# Patient Record
Sex: MALE | Race: WHITE | NOT HISPANIC OR LATINO | Employment: FULL TIME | URBAN - METROPOLITAN AREA
[De-identification: names, ages, dates, MRNs, and addresses within clinical notes are randomized per-mention and may not be internally consistent; named-entity substitution may affect disease eponyms.]

---

## 2023-06-26 ENCOUNTER — OFFICE VISIT (OUTPATIENT)
Dept: ORTHOPEDICS | Facility: CLINIC | Age: 43
End: 2023-06-26
Payer: COMMERCIAL

## 2023-06-26 ENCOUNTER — HOSPITAL ENCOUNTER (OUTPATIENT)
Dept: RADIOLOGY | Facility: HOSPITAL | Age: 43
Discharge: HOME | End: 2023-06-26
Attending: PHYSICIAN ASSISTANT
Payer: COMMERCIAL

## 2023-06-26 DIAGNOSIS — S46.912A ELBOW STRAIN, LEFT, INITIAL ENCOUNTER: Primary | ICD-10-CM

## 2023-06-26 DIAGNOSIS — M25.522 LEFT ELBOW PAIN: Primary | ICD-10-CM

## 2023-06-26 DIAGNOSIS — M25.522 LEFT ELBOW PAIN: ICD-10-CM

## 2023-06-26 PROCEDURE — 99999 PR PBB SHADOW E&M-NEW PATIENT-LVL II: ICD-10-PCS | Mod: PBBFAC,,, | Performed by: PHYSICIAN ASSISTANT

## 2023-06-26 PROCEDURE — 73080 X-RAY EXAM OF ELBOW: CPT | Mod: 26,LT,, | Performed by: RADIOLOGY

## 2023-06-26 PROCEDURE — 73080 XR ELBOW COMPLETE 3 VIEW LEFT: ICD-10-PCS | Mod: 26,LT,, | Performed by: RADIOLOGY

## 2023-06-26 PROCEDURE — 99203 PR OFFICE/OUTPT VISIT, NEW, LEVL III, 30-44 MIN: ICD-10-PCS | Mod: S$GLB,,, | Performed by: PHYSICIAN ASSISTANT

## 2023-06-26 PROCEDURE — 1160F PR REVIEW ALL MEDS BY PRESCRIBER/CLIN PHARMACIST DOCUMENTED: ICD-10-PCS | Mod: CPTII,S$GLB,, | Performed by: PHYSICIAN ASSISTANT

## 2023-06-26 PROCEDURE — 99203 OFFICE O/P NEW LOW 30 MIN: CPT | Mod: S$GLB,,, | Performed by: PHYSICIAN ASSISTANT

## 2023-06-26 PROCEDURE — 73080 X-RAY EXAM OF ELBOW: CPT | Mod: TC,LT

## 2023-06-26 PROCEDURE — 1159F MED LIST DOCD IN RCRD: CPT | Mod: CPTII,S$GLB,, | Performed by: PHYSICIAN ASSISTANT

## 2023-06-26 PROCEDURE — 1159F PR MEDICATION LIST DOCUMENTED IN MEDICAL RECORD: ICD-10-PCS | Mod: CPTII,S$GLB,, | Performed by: PHYSICIAN ASSISTANT

## 2023-06-26 PROCEDURE — 1160F RVW MEDS BY RX/DR IN RCRD: CPT | Mod: CPTII,S$GLB,, | Performed by: PHYSICIAN ASSISTANT

## 2023-06-26 PROCEDURE — 99999 PR PBB SHADOW E&M-NEW PATIENT-LVL II: CPT | Mod: PBBFAC,,, | Performed by: PHYSICIAN ASSISTANT

## 2023-06-26 NOTE — PROGRESS NOTES
Chief Complaint & History of Present Illness:  Mitesh Aquino is a 43 y.o. year old male presenting with left upper arm pain since 5/19/23.   He was in his attic and fell through sheetrock and when he grabbed onto the ceiling he felt a pull through his biceps. Most of the pain now seems to around the elbow.  He does have weakness with lifting.  He has noticed a deformity in his biceps.  There is not a history of previous injury or surgery to the elbow.      Review of patient's allergies indicates:   Allergen Reactions    Poison ivy extract          No current outpatient medications on file.     No current facility-administered medications for this visit.       No past medical history on file.    No past surgical history on file.    Vital Signs (Most Recent)  There were no vitals filed for this visit.        Review of Systems:  ROS:  Constitutional: no fever or chills  Eyes: no visual changes  ENT: no nasal congestion or sore throat  Respiratory: no cough or shortness of breath  Cardiovascular: no chest pain or palpitations  Musculoskeletal: no arthralgias or myalgias    OBJECTIVE:     PHYSICAL EXAM:   General Appearance: Well nourished, well developed, in no acute distress.  CV: 2+ UE/LE distal pulses bilaterally.  Resp:  Respirations equal and unlabored.  Neurological: Mood & affect are normal.  GI: Abdomen soft and non-tender.  left  Elbow:   Skin intact  No ecchymosis or swelling  Jaun deformity  Positive hook test  4/5 biceps  ltsi C5-T1  + epl, io, fds, fdp   2+ RP     RADIOGRAPHS:  X-rays of the left elbow, personally reviewed by me, demonstrate well maintained joint space.  No fracture or dislocation.     ASSESSMENT/PLAN:     43 year old male with left arm injury    - I suspect distal biceps tendon rupture  - MRI left elbow to evaluate and then follow up with sports medicine  - Will call to discuss results      7/6/23 Addendum:  Note to clarify injury- patient was spraying for termites in the attic at work  when the injury occurred

## 2023-06-27 ENCOUNTER — TELEPHONE (OUTPATIENT)
Dept: ORTHOPEDICS | Facility: CLINIC | Age: 43
End: 2023-06-27
Payer: COMMERCIAL

## 2023-06-27 NOTE — TELEPHONE ENCOUNTER
Lvm with name and call back number to discuss Mri being pushed back due to approval with insurance

## 2023-07-06 ENCOUNTER — TELEPHONE (OUTPATIENT)
Dept: URGENT CARE | Facility: CLINIC | Age: 43
End: 2023-07-06
Payer: COMMERCIAL

## 2023-07-06 NOTE — TELEPHONE ENCOUNTER
"Spoke with Pt.  States he has a work comp claim.  Provided the adjusters information as Valeria Hathaway, [phone].  I've left a VM with "provider services" at that number.  "

## 2023-07-19 ENCOUNTER — HOSPITAL ENCOUNTER (OUTPATIENT)
Dept: RADIOLOGY | Facility: OTHER | Age: 43
Discharge: HOME | End: 2023-07-19
Attending: PHYSICIAN ASSISTANT
Payer: COMMERCIAL

## 2023-07-19 DIAGNOSIS — S46.912A ELBOW STRAIN, LEFT, INITIAL ENCOUNTER: ICD-10-CM

## 2023-07-19 PROCEDURE — 73221 MRI ELBOW WITHOUT CONTRAST LEFT: ICD-10-PCS | Mod: 26,LT,, | Performed by: RADIOLOGY

## 2023-07-19 PROCEDURE — 73221 MRI JOINT UPR EXTREM W/O DYE: CPT | Mod: TC,LT

## 2023-07-19 PROCEDURE — 73221 MRI JOINT UPR EXTREM W/O DYE: CPT | Mod: 26,LT,, | Performed by: RADIOLOGY

## 2023-07-20 ENCOUNTER — TELEPHONE (OUTPATIENT)
Dept: SPORTS MEDICINE | Facility: CLINIC | Age: 43
End: 2023-07-20
Payer: COMMERCIAL

## 2023-07-20 DIAGNOSIS — S46.212A RUPTURE OF LEFT DISTAL BICEPS TENDON, INITIAL ENCOUNTER: Primary | ICD-10-CM

## 2023-07-20 NOTE — TELEPHONE ENCOUNTER
Left message for patient letting him know that he will not be able to see Dr Mccall today due to not having W/C approval yet. Explained to patient that the appointment will be canceled until approval is obtained.

## 2023-07-24 ENCOUNTER — TELEPHONE (OUTPATIENT)
Dept: SPORTS MEDICINE | Facility: CLINIC | Age: 43
End: 2023-07-24
Payer: COMMERCIAL

## 2023-07-24 NOTE — TELEPHONE ENCOUNTER
Spoke to patient and let him know as soon as we have W/C approval we will call to schedule appointment with Dr Mccall.

## 2023-07-24 NOTE — TELEPHONE ENCOUNTER
----- Message from Fadumo Lerma sent at 7/24/2023  9:45 AM CDT -----  Regarding: pt advice  Contact: [phone]  Pt requesting confirmation of authorization from  in order to be seen. Pt would also like to schedule an appt. Previous appt was cancelled due to no authorization. Pls call to discuss.

## 2023-07-26 ENCOUNTER — TELEPHONE (OUTPATIENT)
Dept: SPORTS MEDICINE | Facility: CLINIC | Age: 43
End: 2023-07-26
Payer: COMMERCIAL

## 2023-07-26 NOTE — TELEPHONE ENCOUNTER
----- Message from Jenelle Pugh sent at 7/26/2023  8:20 AM CDT -----  Regarding: pt advice  Contact: [phone]  Mitesh Aquino calling regarding Patient Advice (message) for #an update regarding worker's comp auth. He would like to schedule an appt asap. Please call

## 2023-07-26 NOTE — PROGRESS NOTES
"CC: LEFT elbow  pain     43 y.o. Right hand dominant. Male presents as a new patient to me. This is a worker's compensation case. He  is the owner of [employer]. Reports on May 19, 2023 he fell through the attic ceiling at work and tried to grab onto the ceiling on the way down. He reports feeling a pop at the time.  He experienced subsequent swelling and bruising about the distal arm and elbow. Pain localized at the insertion and distal muscle belly of bicep with reverse Jaun deformity. Worse with elbow and forearm lifting, pushing and pulling movements - specifically FA supination movement. Better with rest. Denies neck pain or radicular symptoms. Treatment thus far has included activity modifications, rest, and oral medication. Here today to discuss diagnosis and treatment options.     Negative for tobacco.   Negative for diabetes.     Pain Score: 0-No pain    PAST MEDICAL HISTORY:   History reviewed. No pertinent past medical history.    PAST SURGICAL HISTORY:  History reviewed. No pertinent surgical history.    FAMILY HISTORY:  History reviewed. No pertinent family history.    MEDICATIONS:  No current outpatient medications on file.    ALLERGIES:  Review of patient's allergies indicates:   Allergen Reactions    Poison ivy extract      REVIEW OF SYSTEMS:  Constitution: Negative. Negative for chills, fever and night sweats.    Hematologic/Lymphatic: Negative for bleeding problem. Does not bruise/bleed easily.   Skin: Negative for dry skin, itching and rash.   Musculoskeletal: Negative for falls. Positive for left elbow pain and muscle weakness.     All other review of symptoms were reviewed and found to be noncontributory.     PHYSICAL EXAMINATION:  Vitals:  BP (!) 148/93   Pulse 62   Ht 5' 9" (1.753 m)   Wt 93 kg (205 lb 0.4 oz)   BMI 30.28 kg/m²    General: Well-developed well-nourished 43 y.o. malein no acute distress   Cardiovascular: Regular rhythm by palpation of distal " pulse, normal color and temperature, no concerning varicosities on symptomatic side   Lungs: No labored breathing or wheezing appreciated   Neuro: Alert and oriented ×3   Psychiatric: well oriented to person, place and time, demonstrates normal mood and affect   Skin: No rashes, lesions or ulcers, normal temperature, turgor, and texture on uninvolved extremity    Ortho/SPM Exam  Examination of the left elbow demonstrates full elbow flexion and extension.  Lacks 5-10 degrees of terminal forearm supination.  Full forearm pronation.  Significant weakness and some pain on resisted forearm supination.  4- out of 5.  4/5 resisted elbow flexion with some reported weakness.  Positive hook test for distal biceps rupture.  Reverse Jaun deformity.  Stable to varus and valgus stress.  Intact distal triceps.  Palpable distal biceps tendon torn and retracted proximal to the volar antecubital crease.      IMAGING:  Xrays including AP, Lateral and Radial Capitallar views of the left elbow are ordered / images reviewed by me:   No fracture or acute change appreciated. There is a small bony spur seen arising from the dorsal aspect of the olecranon process.    MRI of Left elbow 7/19/23:  Findings consistent with chronic tear distal biceps tendon given nonvisualization at the level the radial tuberosity and stump of retracted biceps approximately 8 cm proximal (level of the distal humerus).    ASSESSMENT:      ICD-10-CM ICD-9-CM   1. 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  The patient is now 10 weeks out from his date of injury and I have some concerns regarding whether this will be primarily repairable.  Discussed both operative and nonoperative treatment options and the typical functional limitations associated with nonoperative treatment in this situation.  The patient wishes to proceed with operative repair/reconstruction  given his desire to return to prior activities as much as possible.  We have discussed the chronicity of this injury and how this will affect our surgical approach.  Plan for likely reconstruction but will assess tendon quality and repair ability intraoperatively.  Plan for allograft tissue reconstruction and we discussed specifically the planned S shaped skin incision which will certainly affect his tattoos.  He verbalized understanding.  Outlined risks of surgery include but are not limited to continued or recurrent pain, weakness, heterotopic ossification, neurovascular injury, scarring, cosmetic disruption of his existing tattoos, infection among others.  I expect him to do well.  He does wish to proceed.    Plan is left distal biceps allograft reconstruction    Informed Consent:    The details of the surgical procedure were explained, including the location of probable incisions and a description of possible hardware and/or grafts to be used. Alternatives to both operative and non-operative options with associated risks and benefits were discussed. The patient understands the likely convalescence after surgery and, in particular, the expected postop rehab and recovery course. The outlined risks and potential complications of the proposed procedure include but are not limited to: infection, poor wound healing, scarring, deformity, stiffness, swelling, continued or recurrent pain, instability, hardware or prosthetic failure if implanted, symptomatic hardware requiring removal, dislocation, weakness, neurovascular injury, numbness, chronic regional pain disorder, tissue nonhealing/irreparability/retear, subsequent contralateral limb injury or pathology, chondral injury, arthritis, fracture, blood clot formation, inability to return to previous level of activity, anesthetic or regional block complication up to death, need for additional procedure as indicated intraoperatively, and potential need for further  surgery.    The patient was also informed and understands that the risks of surgery are greater for patients with a current condition or history of heart disease, obesity, clotting disorders, recurrent infections, steroid use, current or past smoking, and factors such as sedentary lifestyle and noncompliance with medications, therapy or follow-up. The degree of the increased risk is hard to estimate with any degree of precision. If applicable, smoking cessation was discussed.     All questions were answered. The patient has verbalized understanding of these issues and wishes to proceed with the surgery as discussed.    Procedures

## 2023-07-27 ENCOUNTER — TELEPHONE (OUTPATIENT)
Dept: SPORTS MEDICINE | Facility: CLINIC | Age: 43
End: 2023-07-27
Payer: COMMERCIAL

## 2023-07-27 ENCOUNTER — OFFICE VISIT (OUTPATIENT)
Dept: SPORTS MEDICINE | Facility: CLINIC | Age: 43
End: 2023-07-27
Payer: COMMERCIAL

## 2023-07-27 ENCOUNTER — PATIENT MESSAGE (OUTPATIENT)
Dept: SPORTS MEDICINE | Facility: CLINIC | Age: 43
End: 2023-07-27

## 2023-07-27 VITALS
BODY MASS INDEX: 30.36 KG/M2 | WEIGHT: 205 LBS | HEIGHT: 69 IN | HEART RATE: 62 BPM | DIASTOLIC BLOOD PRESSURE: 93 MMHG | SYSTOLIC BLOOD PRESSURE: 148 MMHG

## 2023-07-27 DIAGNOSIS — S46.212A RUPTURE OF LEFT DISTAL BICEPS TENDON, INITIAL ENCOUNTER: ICD-10-CM

## 2023-07-27 PROCEDURE — 3080F DIAST BP >= 90 MM HG: CPT | Mod: CPTII,S$GLB,, | Performed by: ORTHOPAEDIC SURGERY

## 2023-07-27 PROCEDURE — 3008F PR BODY MASS INDEX (BMI) DOCUMENTED: ICD-10-PCS | Mod: CPTII,S$GLB,, | Performed by: ORTHOPAEDIC SURGERY

## 2023-07-27 PROCEDURE — 1159F MED LIST DOCD IN RCRD: CPT | Mod: CPTII,S$GLB,, | Performed by: ORTHOPAEDIC SURGERY

## 2023-07-27 PROCEDURE — 3080F PR MOST RECENT DIASTOLIC BLOOD PRESSURE >= 90 MM HG: ICD-10-PCS | Mod: CPTII,S$GLB,, | Performed by: ORTHOPAEDIC SURGERY

## 2023-07-27 PROCEDURE — 3008F BODY MASS INDEX DOCD: CPT | Mod: CPTII,S$GLB,, | Performed by: ORTHOPAEDIC SURGERY

## 2023-07-27 PROCEDURE — 3077F SYST BP >= 140 MM HG: CPT | Mod: CPTII,S$GLB,, | Performed by: ORTHOPAEDIC SURGERY

## 2023-07-27 PROCEDURE — 1159F PR MEDICATION LIST DOCUMENTED IN MEDICAL RECORD: ICD-10-PCS | Mod: CPTII,S$GLB,, | Performed by: ORTHOPAEDIC SURGERY

## 2023-07-27 PROCEDURE — 99204 OFFICE O/P NEW MOD 45 MIN: CPT | Mod: S$GLB,,, | Performed by: ORTHOPAEDIC SURGERY

## 2023-07-27 PROCEDURE — 3077F PR MOST RECENT SYSTOLIC BLOOD PRESSURE >= 140 MM HG: ICD-10-PCS | Mod: CPTII,S$GLB,, | Performed by: ORTHOPAEDIC SURGERY

## 2023-07-27 PROCEDURE — 99999 PR PBB SHADOW E&M-EST. PATIENT-LVL III: ICD-10-PCS | Mod: PBBFAC,,, | Performed by: ORTHOPAEDIC SURGERY

## 2023-07-27 PROCEDURE — 99204 PR OFFICE/OUTPT VISIT, NEW, LEVL IV, 45-59 MIN: ICD-10-PCS | Mod: S$GLB,,, | Performed by: ORTHOPAEDIC SURGERY

## 2023-07-27 PROCEDURE — 99999 PR PBB SHADOW E&M-EST. PATIENT-LVL III: CPT | Mod: PBBFAC,,, | Performed by: ORTHOPAEDIC SURGERY

## 2023-07-27 NOTE — TELEPHONE ENCOUNTER
Dr. Mccall spoke to patient via phone about the risks associated with distal biceps reconstruction surgery.     Kaley Diamond [facility], OT  Sports Medicine Assistant - Dr. Taj Mccall   [facility] [place]

## 2023-07-28 ENCOUNTER — PATIENT MESSAGE (OUTPATIENT)
Dept: SPORTS MEDICINE | Facility: CLINIC | Age: 43
End: 2023-07-28
Payer: COMMERCIAL

## 2023-07-28 DIAGNOSIS — S46.212A RUPTURE OF LEFT DISTAL BICEPS TENDON, INITIAL ENCOUNTER: Primary | ICD-10-CM

## 2023-08-02 ENCOUNTER — PATIENT MESSAGE (OUTPATIENT)
Dept: SURGERY | Facility: HOSPITAL | Age: 43
End: 2023-08-02
Payer: COMMERCIAL

## 2023-08-03 ENCOUNTER — PATIENT MESSAGE (OUTPATIENT)
Dept: PREADMISSION TESTING | Facility: HOSPITAL | Age: 43
End: 2023-08-03
Payer: COMMERCIAL

## 2023-08-03 NOTE — ANESTHESIA PAT ROS NOTE
08/03/2023  Mitesh Aquino is a 43 y.o., male.      Pre-op Assessment    I have reviewed the Patient Summary Reports.       I have reviewed the Medications.     Review of Systems  Anesthesia Hx:  No previous Anesthesia   Neg history of prior surgery.             Social:  Non-Smoker       Hematology/Oncology:  Hematology Normal   Oncology Normal                                   EENT/Dental:  EENT/Dental Normal           Cardiovascular:  Cardiovascular Normal Exercise tolerance: good       Denies CAD.       Denies Angina.       Denies EDMONDS.                            Pulmonary:  Pulmonary Normal    Denies Asthma.   Denies Shortness of breath.                  Renal/:  Renal/ Normal  Denies Chronic Renal Disease.                Hepatic/GI:  Hepatic/GI Normal     Denies GERD. Denies Liver Disease.            Musculoskeletal:     Rupture of left distal biceps tendon,            Neurological:  Neurology Normal      Denies Headaches. Denies Seizures.                                Endocrine:  Denies Diabetes. Denies Hypothyroidism.        Obesity / BMI > 30  Dermatological:  Skin Normal    Psych:  Psychiatric Normal                   No past medical history on file.  No past surgical history on file.      Anesthesia Assessment: Preoperative EQUATION    Planned Procedure: Procedure(s) (LRB):  REPAIR, TENDON, ARTHROSCOPIC, WITH CONVERSION TO OPEN TENDON REPAIR IF INDICATED, RECONSTRUCTION (Left)  Requested Anesthesia Type:General  Surgeon: CHRISSY Mccall MD  Service: Orthopedics  Known or anticipated Date of Surgery:8/9/2023    Surgeon notes: reviewed    Electronic QUestionnaire Assessment completed via nurse interview with patient.        Triage considerations:     The patient has no apparent active cardiac condition (No unstable coronary Syndrome such as severe unstable angina or recent [<1 month] myocardial  infarction, decompensated CHF, severe valvular   disease or significant arrhythmia)    Previous anesthesia records:None    Last PCP note:  No primary care visits noted upon chart review.    Other important co-morbidities: Obesity                Instructions given. (See in Nurse's note)      Ht: 5'9  Wt; 205 lb  BMI: 30.28

## 2023-08-04 ENCOUNTER — PATIENT MESSAGE (OUTPATIENT)
Dept: SURGERY | Facility: HOSPITAL | Age: 43
End: 2023-08-04
Payer: COMMERCIAL

## 2023-08-07 ENCOUNTER — OFFICE VISIT (OUTPATIENT)
Dept: SPORTS MEDICINE | Facility: CLINIC | Age: 43
End: 2023-08-07
Payer: COMMERCIAL

## 2023-08-07 VITALS
WEIGHT: 204.13 LBS | BODY MASS INDEX: 30.15 KG/M2 | DIASTOLIC BLOOD PRESSURE: 90 MMHG | SYSTOLIC BLOOD PRESSURE: 158 MMHG | HEART RATE: 62 BPM

## 2023-08-07 DIAGNOSIS — S46.212A RUPTURE OF LEFT DISTAL BICEPS TENDON, INITIAL ENCOUNTER: Primary | ICD-10-CM

## 2023-08-07 PROCEDURE — 99499 UNLISTED E&M SERVICE: CPT | Mod: S$GLB,,, | Performed by: PHYSICIAN ASSISTANT

## 2023-08-07 PROCEDURE — 99999 PR PBB SHADOW E&M-EST. PATIENT-LVL III: ICD-10-PCS | Mod: PBBFAC,,, | Performed by: PHYSICIAN ASSISTANT

## 2023-08-07 PROCEDURE — 99999 PR PBB SHADOW E&M-EST. PATIENT-LVL III: CPT | Mod: PBBFAC,,, | Performed by: PHYSICIAN ASSISTANT

## 2023-08-07 PROCEDURE — 99499 NO LOS: ICD-10-PCS | Mod: S$GLB,,, | Performed by: PHYSICIAN ASSISTANT

## 2023-08-07 RX ORDER — ONDANSETRON 4 MG/1
4 TABLET, ORALLY DISINTEGRATING ORAL EVERY 8 HOURS PRN
Qty: 30 TABLET | Refills: 0 | Status: SHIPPED | OUTPATIENT
Start: 2023-08-07

## 2023-08-07 RX ORDER — ASPIRIN 81 MG/1
81 TABLET ORAL 2 TIMES DAILY
Qty: 28 TABLET | Refills: 0 | Status: SHIPPED | OUTPATIENT
Start: 2023-08-07 | End: 2023-08-23

## 2023-08-07 RX ORDER — OXYCODONE HYDROCHLORIDE 5 MG/1
5-10 TABLET ORAL
Qty: 28 TABLET | Refills: 0 | Status: SHIPPED | OUTPATIENT
Start: 2023-08-07

## 2023-08-07 RX ORDER — INDOMETHACIN 75 MG/1
75 CAPSULE, EXTENDED RELEASE ORAL
Qty: 21 CAPSULE | Refills: 0 | Status: SHIPPED | OUTPATIENT
Start: 2023-08-07

## 2023-08-07 NOTE — H&P
Mitesh Aquino  is here for a completion of his perioperative paperwork. he  Is scheduled to undergo left distal biceps allograft reconstruction on 8/9/2023.  He is a healthy individual and does not need clearance for this procedure.     This is a workers comp case.    Risks, indications and benefits of the surgical procedure were discussed with the patient. All questions with regard to surgery, rehab, expected return to functional activities, activities of daily living and recreational endeavors were answered to his satisfaction.    Discussed COVID-19 with the patient, they are aware of our current policies and procedures, were given the option of delaying surgery, and they elect to proceed.    Patient was informed and understands the risks of surgery are greater for patients with a current condition or hx of heart disease, obesity, clotting disorders, recurrent infections, steroid use, current or past smoking, and factors such as sedentary lifestyle and noncompliance with medications, therapy or f/u. The degree of the increased risk is hard to estimate w/ any degree of precision.    Once no other questions were asked, a brief history and physical exam was then performed.    PAST MEDICAL HISTORY: History reviewed. No pertinent past medical history.  PAST SURGICAL HISTORY: History reviewed. No pertinent surgical history.  FAMILY HISTORY: History reviewed. No pertinent family history.  SOCIAL HISTORY:   Social History     Socioeconomic History    Marital status:    Tobacco Use    Smoking status: Former     Current packs/day: 0.00     Types: Cigarettes    Smokeless tobacco: Never       MEDICATIONS: No current outpatient medications on file.  ALLERGIES:   Review of patient's allergies indicates:   Allergen Reactions    Poison ivy extract        Review of Systems   Constitution: Negative. Negative for chills, fever and night sweats.   HENT: Negative for congestion and headaches.    Eyes: Negative for blurred  vision, left vision loss and right vision loss.   Cardiovascular: Negative for chest pain and syncope.   Respiratory: Negative for cough and shortness of breath.    Endocrine: Negative for polydipsia, polyphagia and polyuria.   Hematologic/Lymphatic: Negative for bleeding problem. Does not bruise/bleed easily.   Skin: Negative for dry skin, itching and rash.   Musculoskeletal: Negative for falls and muscle weakness.   Gastrointestinal: Negative for abdominal pain and bowel incontinence.   Genitourinary: Negative for bladder incontinence and nocturia.   Neurological: Negative for disturbances in coordination, loss of balance and seizures.   Psychiatric/Behavioral: Negative for depression. The patient does not have insomnia.    Allergic/Immunologic: Negative for hives and persistent infections.     PHYSICAL EXAM:  GEN: A&Ox3, WD WN NAD  HEENT: WNL  CHEST: CTAB, no W/R/R  HEART: RRR, no M/R/G   ABD: Soft, NT ND, BS x4 QUADS  MS: Refer to previous note for detailed MS exam  NEURO: CN II-XII intact       The surgical consent was then reviewed with the patient, who agreed with all the contents of the consent form and it was signed.     PHYSICAL THERAPY:  He was also instructed regarding physical therapy and will begin on POD#1-3. He is doing physical therapy at [facility].    POST OP CARE: Instructions were reviewed including care of the wound and dressing after surgery and when he can shower.     PAIN MANAGEMENT: Mitesh Aquino was instructed regarding the ice packs that will be in place after surgery and his postoperative pain medications.     MEDICATION:  Roxicodone 5 mg 1-2 q 4 hours PRN for pain  Zofran 4 mg q 8 hours PRN for nausea and vomiting.  Aspirin 81mg BID x 2 weeks for DVT prophylaxis starting on the evening after surgery.  Indocin 75mg daily x 21 days post op for HO prophylaxis    Post op meds to be delivered bedside prior to discharge. Deliver to family if patient is in surgery at  5pm.     Patient was instructed to purchase and take Colace to counter possible GI side effects of taking opiates.     DVT prophylaxis was discussed with the patient today including risk factors for developing DVTs and history of DVTs. The patient was asked if any specific recommendations were given from the doctor/s that did pre-operative surgical clearance.      If the patient was previously taking 81mg baby aspirin, they were told to not take additional baby aspirin, using the above stated aspirin and to restart the 81mg aspirin daily after completion of the aspirin dose.      Patient was also told to buy over the counter Prilosec medication and take it once daily for GI protection as long as they are taking NSAIDs or Aspirin.     The patient was told that narcotic pain medications may make them drowsy and instructions were given to not sign legal documents, drive or operate heavy machinery, cars, or equipment while under the influence of narcotic medications.     As there were no other questions to be asked, he was given my business card along with Dr. Mccall's business card if he has any questions or concerns prior to surgery or in the postop period.

## 2023-08-08 ENCOUNTER — ANESTHESIA EVENT (OUTPATIENT)
Dept: SURGERY | Facility: HOSPITAL | Age: 43
End: 2023-08-08
Payer: COMMERCIAL

## 2023-08-08 NOTE — ANESTHESIA PREPROCEDURE EVALUATION
"[facility]  Anesthesia Pre-Operative Evaluation         Patient Name: Mitesh Aquino  YOB: 1980  MRN: [id number]    SUBJECTIVE:     Pre-operative evaluation for Procedure(s) (LRB):  REPAIR, TENDON, ARTHROSCOPIC, WITH CONVERSION TO OPEN TENDON REPAIR IF INDICATED, RECONSTRUCTION (Left)     08/08/2023    Mitesh Aquino is a 43 y.o. male w/ no significant PMHx. Recently had a traumatic incident at work where he fell through the ceiling resulting in a ruptured distal biceps tendon L.     Patient now presents for the above procedure(s) with Dr. Mccall.    Prev airway: None documented.      There is no problem list on file for this patient.      Review of patient's allergies indicates:   Allergen Reactions    Poison ivy extract        Current Inpatient Medications:      No current facility-administered medications on file prior to encounter.     No current outpatient medications on file prior to encounter.       No past surgical history on file.    Social History     Socioeconomic History    Marital status:    Tobacco Use    Smoking status: Former     Current packs/day: 0.00     Types: Cigarettes    Smokeless tobacco: Never       OBJECTIVE:     Vital Signs Range (Last 24H):         Significant Labs:  No results found for: "WBC", "HGB", "HCT", "PLT", "CHOL", "TRIG", "HDL", "LDLDIRECT", "ALT", "AST", "NA", "K", "CL", "CREATININE", "BUN", "CO2", "TSH", "PSA", "INR", "GLUF", "HGBA1C", "MICROALBUR"    Diagnostic Studies: No relevant studies.    EKG:   No results found for this or any previous visit.    2D ECHO:  TTE:  No results found for this or any previous visit.    DICK:  No results found for this or any previous visit.    ASSESSMENT/PLAN:         Pre-op Assessment    I have reviewed the Patient Summary Reports.     I have reviewed the Nursing Notes. I have reviewed the NPO Status.   I have reviewed the Medications.     Review of Systems  Anesthesia Hx:  No problems with previous " Anesthesia  Neg history of prior surgery. Denies Family Hx of Anesthesia complications.   Denies Personal Hx of Anesthesia complications.   Social:  Former Smoker    Hematology/Oncology:  Hematology Normal   Oncology Normal     EENT/Dental:EENT/Dental Normal   Cardiovascular:  Cardiovascular Normal     Pulmonary:  Pulmonary Normal    Renal/:  Renal/ Normal     Hepatic/GI:  Hepatic/GI Normal    Musculoskeletal:  Musculoskeletal Normal    Neurological:  Neurology Normal    Endocrine:  Endocrine Normal    Dermatological:  Skin Normal    Psych:  Psychiatric Normal           Physical Exam  General: Well nourished, Cooperative, Alert and Oriented    Airway:  Mallampati: II   Mouth Opening: Normal  TM Distance: Normal  Tongue: Normal  Neck ROM: Normal ROM    Dental:  Intact        Anesthesia Plan  Type of Anesthesia, risks & benefits discussed:    Anesthesia Type: Gen ETT, Regional  Intra-op Monitoring Plan: Standard ASA Monitors  Post Op Pain Control Plan: multimodal analgesia, IV/PO Opioids PRN and peripheral nerve block  Induction:  IV  Airway Plan: Direct, Post-Induction  Informed Consent: Informed consent signed with the Patient and all parties understand the risks and agree with anesthesia plan.  All questions answered.   ASA Score: 2  Day of Surgery Review of History & Physical: H&P Update referred to the surgeon/provider.    Ready For Surgery From Anesthesia Perspective.     .

## 2023-08-09 ENCOUNTER — HOSPITAL ENCOUNTER (OUTPATIENT)
Facility: HOSPITAL | Age: 43
Discharge: HOME | End: 2023-08-09
Attending: ORTHOPAEDIC SURGERY | Admitting: ORTHOPAEDIC SURGERY
Payer: COMMERCIAL

## 2023-08-09 ENCOUNTER — ANESTHESIA (OUTPATIENT)
Dept: SURGERY | Facility: HOSPITAL | Age: 43
End: 2023-08-09
Payer: COMMERCIAL

## 2023-08-09 VITALS
SYSTOLIC BLOOD PRESSURE: 136 MMHG | DIASTOLIC BLOOD PRESSURE: 87 MMHG | BODY MASS INDEX: 29.62 KG/M2 | OXYGEN SATURATION: 95 % | RESPIRATION RATE: 18 BRPM | HEIGHT: 69 IN | TEMPERATURE: 98 F | HEART RATE: 77 BPM | WEIGHT: 200 LBS

## 2023-08-09 DIAGNOSIS — S46.212A RUPTURE OF LEFT DISTAL BICEPS TENDON, INITIAL ENCOUNTER: ICD-10-CM

## 2023-08-09 PROCEDURE — 63600175 PHARM REV CODE 636 W HCPCS: Mod: JZ | Performed by: ORTHOPAEDIC SURGERY

## 2023-08-09 PROCEDURE — 63600175 PHARM REV CODE 636 W HCPCS: Performed by: PHYSICIAN ASSISTANT

## 2023-08-09 PROCEDURE — 99900035 HC TECH TIME PER 15 MIN (STAT)

## 2023-08-09 PROCEDURE — D9220A PRA ANESTHESIA: Mod: ANES,,, | Performed by: STUDENT IN AN ORGANIZED HEALTH CARE EDUCATION/TRAINING PROGRAM

## 2023-08-09 PROCEDURE — 36000708 HC OR TIME LEV III 1ST 15 MIN: Performed by: ORTHOPAEDIC SURGERY

## 2023-08-09 PROCEDURE — 25000003 PHARM REV CODE 250: Performed by: NURSE ANESTHETIST, CERTIFIED REGISTERED

## 2023-08-09 PROCEDURE — 71000033 HC RECOVERY, INTIAL HOUR: Performed by: ORTHOPAEDIC SURGERY

## 2023-08-09 PROCEDURE — 25000003 PHARM REV CODE 250: Performed by: PHYSICIAN ASSISTANT

## 2023-08-09 PROCEDURE — 63600175 PHARM REV CODE 636 W HCPCS: Performed by: NURSE ANESTHETIST, CERTIFIED REGISTERED

## 2023-08-09 PROCEDURE — 71000015 HC POSTOP RECOV 1ST HR: Performed by: ORTHOPAEDIC SURGERY

## 2023-08-09 PROCEDURE — 27201423 OPTIME MED/SURG SUP & DEVICES STERILE SUPPLY: Performed by: ORTHOPAEDIC SURGERY

## 2023-08-09 PROCEDURE — 25000003 PHARM REV CODE 250: Performed by: STUDENT IN AN ORGANIZED HEALTH CARE EDUCATION/TRAINING PROGRAM

## 2023-08-09 PROCEDURE — 24342 PR REINSERT BI/TRICEPS TENDON,DISTAL: ICD-10-PCS | Mod: 22,LT,, | Performed by: ORTHOPAEDIC SURGERY

## 2023-08-09 PROCEDURE — C1713 ANCHOR/SCREW BN/BN,TIS/BN: HCPCS | Performed by: ORTHOPAEDIC SURGERY

## 2023-08-09 PROCEDURE — 24342 REPAIR OF RUPTURED TENDON: CPT | Mod: 22,LT,, | Performed by: ORTHOPAEDIC SURGERY

## 2023-08-09 PROCEDURE — 94761 N-INVAS EAR/PLS OXIMETRY MLT: CPT

## 2023-08-09 PROCEDURE — 37000008 HC ANESTHESIA 1ST 15 MINUTES: Performed by: ORTHOPAEDIC SURGERY

## 2023-08-09 PROCEDURE — 37000009 HC ANESTHESIA EA ADD 15 MINS: Performed by: ORTHOPAEDIC SURGERY

## 2023-08-09 PROCEDURE — D9220A PRA ANESTHESIA: ICD-10-PCS | Mod: ANES,,, | Performed by: STUDENT IN AN ORGANIZED HEALTH CARE EDUCATION/TRAINING PROGRAM

## 2023-08-09 PROCEDURE — D9220A PRA ANESTHESIA: Mod: CRNA,,, | Performed by: NURSE ANESTHETIST, CERTIFIED REGISTERED

## 2023-08-09 PROCEDURE — D9220A PRA ANESTHESIA: ICD-10-PCS | Mod: CRNA,,, | Performed by: NURSE ANESTHETIST, CERTIFIED REGISTERED

## 2023-08-09 PROCEDURE — C1762 CONN TISS, HUMAN(INC FASCIA): HCPCS | Performed by: ORTHOPAEDIC SURGERY

## 2023-08-09 PROCEDURE — 36000709 HC OR TIME LEV III EA ADD 15 MIN: Performed by: ORTHOPAEDIC SURGERY

## 2023-08-09 DEVICE — SCREW PEEK TENODESIS 7 X10MM: Type: IMPLANTABLE DEVICE | Site: ELBOW | Status: FUNCTIONAL

## 2023-08-09 DEVICE — SYS IMPL DEL DISTAL BICEPS BC: Type: IMPLANTABLE DEVICE | Site: ELBOW | Status: FUNCTIONAL

## 2023-08-09 DEVICE — TENDON ACHILLES W/CALC 19.5CM: Type: IMPLANTABLE DEVICE | Site: ELBOW | Status: FUNCTIONAL

## 2023-08-09 RX ORDER — CELECOXIB 200 MG/1
400 CAPSULE ORAL
Status: COMPLETED | OUTPATIENT
Start: 2023-08-09 | End: 2023-08-09

## 2023-08-09 RX ORDER — SODIUM CHLORIDE 9 MG/ML
INJECTION, SOLUTION INTRAVENOUS CONTINUOUS
Status: DISCONTINUED | OUTPATIENT
Start: 2023-08-09 | End: 2023-08-09 | Stop reason: HOSPADM

## 2023-08-09 RX ORDER — ROCURONIUM BROMIDE 10 MG/ML
INJECTION, SOLUTION INTRAVENOUS
Status: DISCONTINUED | OUTPATIENT
Start: 2023-08-09 | End: 2023-08-09

## 2023-08-09 RX ORDER — FAMOTIDINE 10 MG/ML
INJECTION INTRAVENOUS
Status: DISCONTINUED | OUTPATIENT
Start: 2023-08-09 | End: 2023-08-09

## 2023-08-09 RX ORDER — CARBOXYMETHYLCELLULOSE SODIUM 10 MG/ML
GEL OPHTHALMIC
Status: DISCONTINUED | OUTPATIENT
Start: 2023-08-09 | End: 2023-08-09

## 2023-08-09 RX ORDER — DEXMEDETOMIDINE HYDROCHLORIDE 100 UG/ML
INJECTION, SOLUTION INTRAVENOUS
Status: DISCONTINUED | OUTPATIENT
Start: 2023-08-09 | End: 2023-08-09

## 2023-08-09 RX ORDER — BUPIVACAINE HYDROCHLORIDE 5 MG/ML
INJECTION, SOLUTION EPIDURAL; INTRACAUDAL
Status: DISCONTINUED | OUTPATIENT
Start: 2023-08-09 | End: 2023-08-09 | Stop reason: HOSPADM

## 2023-08-09 RX ORDER — FENTANYL CITRATE 50 UG/ML
INJECTION, SOLUTION INTRAMUSCULAR; INTRAVENOUS
Status: DISCONTINUED | OUTPATIENT
Start: 2023-08-09 | End: 2023-08-09

## 2023-08-09 RX ORDER — HALOPERIDOL 5 MG/ML
0.5 INJECTION INTRAMUSCULAR EVERY 10 MIN PRN
Status: ACTIVE | OUTPATIENT
Start: 2023-08-09

## 2023-08-09 RX ORDER — CEFAZOLIN SODIUM 2 G/50ML
2 SOLUTION INTRAVENOUS
Status: CANCELLED | OUTPATIENT
Start: 2023-08-09

## 2023-08-09 RX ORDER — ACETAMINOPHEN 500 MG
1000 TABLET ORAL
Status: COMPLETED | OUTPATIENT
Start: 2023-08-09 | End: 2023-08-09

## 2023-08-09 RX ORDER — LIDOCAINE HYDROCHLORIDE 20 MG/ML
INJECTION INTRAVENOUS
Status: DISCONTINUED | OUTPATIENT
Start: 2023-08-09 | End: 2023-08-09

## 2023-08-09 RX ORDER — SODIUM CHLORIDE 0.9 % (FLUSH) 0.9 %
10 SYRINGE (ML) INJECTION
Status: ACTIVE | OUTPATIENT
Start: 2023-08-09

## 2023-08-09 RX ORDER — SODIUM CHLORIDE 9 MG/ML
INJECTION, SOLUTION INTRAVENOUS CONTINUOUS
Status: CANCELLED | OUTPATIENT
Start: 2023-08-09

## 2023-08-09 RX ORDER — METHOCARBAMOL 500 MG/1
1000 TABLET, FILM COATED ORAL ONCE
Status: COMPLETED | OUTPATIENT
Start: 2023-08-09 | End: 2023-08-09

## 2023-08-09 RX ORDER — FENTANYL CITRATE 50 UG/ML
25 INJECTION, SOLUTION INTRAMUSCULAR; INTRAVENOUS EVERY 5 MIN PRN
Status: ACTIVE | OUTPATIENT
Start: 2023-08-09

## 2023-08-09 RX ORDER — MIDAZOLAM HYDROCHLORIDE 1 MG/ML
INJECTION INTRAMUSCULAR; INTRAVENOUS
Status: DISCONTINUED | OUTPATIENT
Start: 2023-08-09 | End: 2023-08-09

## 2023-08-09 RX ORDER — FENTANYL CITRATE 50 UG/ML
25-200 INJECTION, SOLUTION INTRAMUSCULAR; INTRAVENOUS
Status: DISCONTINUED | OUTPATIENT
Start: 2023-08-09 | End: 2023-08-09 | Stop reason: HOSPADM

## 2023-08-09 RX ORDER — OXYCODONE HYDROCHLORIDE 5 MG/1
5 TABLET ORAL
Status: DISCONTINUED | OUTPATIENT
Start: 2023-08-09 | End: 2023-08-09 | Stop reason: HOSPADM

## 2023-08-09 RX ORDER — ONDANSETRON 2 MG/ML
INJECTION INTRAMUSCULAR; INTRAVENOUS
Status: DISCONTINUED | OUTPATIENT
Start: 2023-08-09 | End: 2023-08-09

## 2023-08-09 RX ORDER — MIDAZOLAM HYDROCHLORIDE 1 MG/ML
.5-4 INJECTION INTRAMUSCULAR; INTRAVENOUS
Status: DISCONTINUED | OUTPATIENT
Start: 2023-08-09 | End: 2023-08-09 | Stop reason: HOSPADM

## 2023-08-09 RX ORDER — PROPOFOL 10 MG/ML
VIAL (ML) INTRAVENOUS
Status: DISCONTINUED | OUTPATIENT
Start: 2023-08-09 | End: 2023-08-09

## 2023-08-09 RX ORDER — KETAMINE HCL IN 0.9 % NACL 50 MG/5 ML
SYRINGE (ML) INTRAVENOUS
Status: DISCONTINUED | OUTPATIENT
Start: 2023-08-09 | End: 2023-08-09

## 2023-08-09 RX ORDER — DEXAMETHASONE SODIUM PHOSPHATE 4 MG/ML
INJECTION, SOLUTION INTRA-ARTICULAR; INTRALESIONAL; INTRAMUSCULAR; INTRAVENOUS; SOFT TISSUE
Status: DISCONTINUED | OUTPATIENT
Start: 2023-08-09 | End: 2023-08-09

## 2023-08-09 RX ADMIN — CEFAZOLIN 2 G: 2 INJECTION, POWDER, FOR SOLUTION INTRAMUSCULAR; INTRAVENOUS at 01:08

## 2023-08-09 RX ADMIN — PROPOFOL 50 MG: 10 INJECTION, EMULSION INTRAVENOUS at 01:08

## 2023-08-09 RX ADMIN — MIDAZOLAM HYDROCHLORIDE 2 MG: 1 INJECTION, SOLUTION INTRAMUSCULAR; INTRAVENOUS at 01:08

## 2023-08-09 RX ADMIN — CELECOXIB 400 MG: 200 CAPSULE ORAL at 11:08

## 2023-08-09 RX ADMIN — METHOCARBAMOL 1000 MG: 500 TABLET ORAL at 05:08

## 2023-08-09 RX ADMIN — OXYCODONE HYDROCHLORIDE 5 MG: 5 TABLET ORAL at 04:08

## 2023-08-09 RX ADMIN — ONDANSETRON 4 MG: 2 INJECTION INTRAMUSCULAR; INTRAVENOUS at 03:08

## 2023-08-09 RX ADMIN — FENTANYL CITRATE 100 MCG: 50 INJECTION, SOLUTION INTRAMUSCULAR; INTRAVENOUS at 01:08

## 2023-08-09 RX ADMIN — ROCURONIUM BROMIDE 50 MG: 10 INJECTION, SOLUTION INTRAVENOUS at 01:08

## 2023-08-09 RX ADMIN — PROPOFOL 200 MG: 10 INJECTION, EMULSION INTRAVENOUS at 01:08

## 2023-08-09 RX ADMIN — DEXMEDETOMIDINE 8 MCG: 100 INJECTION, SOLUTION, CONCENTRATE INTRAVENOUS at 01:08

## 2023-08-09 RX ADMIN — Medication 25 MG: at 01:08

## 2023-08-09 RX ADMIN — CARBOXYMETHYLCELLULOSE SODIUM 4 DROP: 10 GEL OPHTHALMIC at 01:08

## 2023-08-09 RX ADMIN — DEXAMETHASONE SODIUM PHOSPHATE 8 MG: 4 INJECTION, SOLUTION INTRAMUSCULAR; INTRAVENOUS at 01:08

## 2023-08-09 RX ADMIN — SUGAMMADEX 200 MG: 100 INJECTION, SOLUTION INTRAVENOUS at 04:08

## 2023-08-09 RX ADMIN — FAMOTIDINE 20 MG: 10 INJECTION, SOLUTION INTRAVENOUS at 01:08

## 2023-08-09 RX ADMIN — LIDOCAINE HYDROCHLORIDE 100 MG: 20 INJECTION INTRAVENOUS at 01:08

## 2023-08-09 RX ADMIN — SODIUM CHLORIDE: 9 INJECTION, SOLUTION INTRAVENOUS at 11:08

## 2023-08-09 RX ADMIN — SODIUM CHLORIDE, SODIUM GLUCONATE, SODIUM ACETATE, POTASSIUM CHLORIDE, MAGNESIUM CHLORIDE, SODIUM PHOSPHATE, DIBASIC, AND POTASSIUM PHOSPHATE: .53; .5; .37; .037; .03; .012; .00082 INJECTION, SOLUTION INTRAVENOUS at 02:08

## 2023-08-09 RX ADMIN — ACETAMINOPHEN 1000 MG: 500 TABLET ORAL at 11:08

## 2023-08-09 NOTE — BRIEF OP NOTE
OUTPATIENT ORTHOPAEDIC SURGERY    Brief Operative Note       Surgery Date: 8/9/2023     Pre-op Diagnosis:  Rupture of left distal biceps tendon, initial encounter [S46.212A]    Post-op Diagnosis: Rupture of left distal biceps tendon, initial encounter [S46.212A]    Procedures: Procedure(s) (LRB):  REPAIR, TENDON, ARTHROSCOPIC, WITH CONVERSION TO OPEN TENDON REPAIR IF INDICATED, RECONSTRUCTION (Left)    Surgeon: Surgeon(s) and Role:     * CHRISSY Mccall MD - Primary    Anesthesia: General    Findings/Key Components: chronic, retracted left distal biceps rupture     Core Measure Documentation:  Were antibiotics extended? No  Was the patient administered a VTE Prophylaxis? No. Short procedure; low risk  Estimated Blood Loss: minimal           Specimens (From admission, onward)      None            Implants:   Implant Name Type Inv. Item Serial No.  Lot No. LRB No. Used Action   SYS IMPL DEL DISTAL BICEPS BC - [id number]  SYS IMPL DEL DISTAL BICEPS BC  [employer] [id number] Left 1 Implanted   SCREW PEEK TENODESIS 7 X10MM - [id number]  SCREW PEEK TENODESIS 7 X10MM  [employer] [id number] Left 1 Implanted   [id number]  TENDON ACHILLES W/CALC 19.5CM [id number] MTF  Left 1 Implanted       Complications: none           Disposition: PACU - hemodynamically stable.           Condition: Stable    Attestation:  I was present for the entire procedure.    Discharge Note      SUMMARY     Admit Date: 8/9/2023    Attending Physician: CHRISSY Mccall MD     Discharge Date: 08/09/2023    Final Diagnosis: please see operative report    Hospital Course of Care: Patient underwent elective surgery surgery and was transferred to PACU in stable condition.  In PACU, patient received appropriate post-operative care and was transferred to medical floor for neurovascular monitoring and pain control.  There patient progressed appropriately. Once met anesthesia derived discharge criteria, the patient was discharged home with plans for  physical therapy and follow-up with the operative surgeon.    Disposition: Home or Self Care    Patient Instructions:   Current Discharge Medication List        CONTINUE these medications which have NOT CHANGED    Details   aspirin (ECOTRIN) 81 MG EC tablet Take 1 tablet (81 mg total) by mouth 2 (two) times a day. for 14 days  Qty: 28 tablet, Refills: 0    Associated Diagnoses: Rupture of left distal biceps tendon, initial encounter      indomethacin (INDOCIN SR) 75 mg CpSR CR capsule Take 1 capsule (75 mg total) by mouth daily with breakfast.  Qty: 21 capsule, Refills: 0    Associated Diagnoses: Rupture of left distal biceps tendon, initial encounter      ondansetron (ZOFRAN-ODT) 4 MG TbDL Dissolve 1 tablet (4 mg total) by mouth every 8 (eight) hours as needed (nausea).  Qty: 30 tablet, Refills: 0    Associated Diagnoses: Rupture of left distal biceps tendon, initial encounter      oxyCODONE (ROXICODONE) 5 MG immediate release tablet Take 1-2 tablets (5-10 mg total) by mouth every 4 to 6 hours as needed for Pain.  Qty: 28 tablet, Refills: 0    Associated Diagnoses: Rupture of left distal biceps tendon, initial encounter             Discharge Procedure Orders (must include Diet, Follow-up, Activity)  No discharge procedures on file.     Activity: Per printed postoperative instructions.     Diet: Resume pre-surgery diet.    Follow-up: 2 weeks with Dr. Taj Mccall

## 2023-08-09 NOTE — ANESTHESIA PROCEDURE NOTES
Intubation    Date/Time: 8/9/2023 1:22 PM    Performed by: Zuleika Adam CRNA  Authorized by: Dipesh Walter MD    Intubation:     Induction:  Intravenous    Intubated:  Postinduction    Mask Ventilation:  Easy mask    Attempts:  1    Attempted By:  CRNA (Siva)    Method of Intubation:  Video laryngoscopy    Blade:  Proctor 3    Laryngeal View Grade: Grade I - full view of cords      Difficult Airway Encountered?: No      Complications:  None    Airway Device:  Oral endotracheal tube    Airway Device Size:  7.5    Style/Cuff Inflation:  Cuffed    Inflation Amount (mL):  6    Tube secured:  22    Secured at:  The lips    Placement Verified By:  Capnometry    Complicating Factors:  None    Findings Post-Intubation:  BS equal bilateral and atraumatic/condition of teeth unchanged

## 2023-08-09 NOTE — ANESTHESIA POSTPROCEDURE EVALUATION
Anesthesia Post Evaluation    Patient: Mitesh Aquino    Procedure(s) Performed: Procedure(s) (LRB):  REPAIR, TENDON, ARTHROSCOPIC, WITH CONVERSION TO OPEN TENDON REPAIR IF INDICATED, RECONSTRUCTION (Left)    Final Anesthesia Type: general      Patient location during evaluation: PACU  Patient participation: Yes- Able to Participate  Level of consciousness: awake and alert  Post-procedure vital signs: reviewed and stable  Pain management: adequate  Airway patency: patent  GALE mitigation strategies: Multimodal analgesia  PONV status at discharge: No PONV  Anesthetic complications: no      Cardiovascular status: blood pressure returned to baseline and hemodynamically stable  Respiratory status: unassisted  Hydration status: euvolemic  Follow-up not needed.          Vitals Value Taken Time   /87 08/09/23 1647   Temp 37 08/09/23 1716   Pulse 73 08/09/23 1657   Resp 25 08/09/23 1657   SpO2 96 % 08/09/23 1657   Vitals shown include unvalidated device data.      No case tracking events are documented in the log.      Pain/Fredi Score: Pain Rating Prior to Med Admin: 2 (8/9/2023  4:54 PM)  Fredi Score: 8 (8/9/2023  4:20 PM)

## 2023-08-09 NOTE — INTERVAL H&P NOTE
The patient has been examined and the H&P has been reviewed:    I concur with the findings and no changes have occurred since H&P was written.    Surgery risks, benefits and alternative options discussed and understood by patient/family.    Plan for left distal biceps allograft reconstruction with Dr. Mccall     There are no hospital problems to display for this patient.    Destin Reich M.D.  Orthopedic Sports Medicine Fellow

## 2023-08-09 NOTE — PROGRESS NOTES
Patient complaining of tightness of splint.   Oxy 5 mg and robaxin 1000 mg administered per pacu orders.  Dr. Reich notified.    to come to bedside to evaluate.

## 2023-08-09 NOTE — PROGRESS NOTES
"Dr Reich at bedside to assess patients splint on LUE. Ace wrap removed, splint stretched and new ace wrap applied per Dr. Reich. Patient states "feels fine. Better." Patient able to wiggle fingers and has good cap refill. Splint put back in place per Damir [address]. No further orders. Patient to d/c to home.  "

## 2023-08-09 NOTE — TRANSFER OF CARE
"Anesthesia Transfer of Care Note    Patient: Mitesh Aquino    Procedure(s) Performed: Procedure(s) (LRB):  REPAIR, TENDON, ARTHROSCOPIC, WITH CONVERSION TO OPEN TENDON REPAIR IF INDICATED, RECONSTRUCTION (Left)    Patient location: PACU    Anesthesia Type: general    Transport from OR: Transported from OR on 100% O2 by closed face mask with adequate spontaneous ventilation    Post pain: adequate analgesia    Post assessment: no apparent anesthetic complications and tolerated procedure well    Post vital signs: stable    Level of consciousness: awake and responds to stimulation    Nausea/Vomiting: no nausea/vomiting    Complications: none    Transfer of care protocol was followed      Last vitals:   Visit Vitals  /78 (BP Location: Right arm, Patient Position: Lying)   Pulse 70   Temp 36.7 °C (98 °F) (Oral)   Resp 18   Ht 5' 9" (1.753 m)   Wt 90.7 kg (200 lb)   SpO2 100%   BMI 29.53 kg/m²     "

## 2023-08-09 NOTE — PLAN OF CARE
VSS.  Patient tolerating oral liquids without difficulty.   No apparent s&s of distress noted at this time, no complaints voiced at this time.   Discharge instructions reviewed with patient and spouse with good verbal feedback received.   Post op medications delivered to bedside, DME arm sling intact.  Patient ready for discharge.

## 2023-08-10 ENCOUNTER — PATIENT MESSAGE (OUTPATIENT)
Dept: SPORTS MEDICINE | Facility: CLINIC | Age: 43
End: 2023-08-10
Payer: COMMERCIAL

## 2023-08-10 RX ORDER — METHOCARBAMOL 500 MG/1
500 TABLET, FILM COATED ORAL 4 TIMES DAILY
Qty: 40 TABLET | Refills: 0 | Status: SHIPPED | OUTPATIENT
Start: 2023-08-10 | End: 2023-08-20

## 2023-08-12 NOTE — OP NOTE
[facility]   OPERATIVE REPORT   ORTHOPAEDIC SURGERY   PROVIDER: DR. MADELYN CISNEROS    PATIENT INFORMATION   Mitesh Aquino 43 y.o. male 1980   MRN: [id number]   LOCATION: [facility]     DATE OF PROCEDURE: 8/9/2023     PREOPERATIVE DIAGNOSES:   Left chronic distal biceps rupture     POSTOPERATIVE DIAGNOSES:   Left chronic distal biceps rupture     PROCEDURES PERFORMED:   Left distal biceps open achilles allograft reconstruction (CPT 70361-51)     COMPLEX PROCEDURE:    This was a complex allograft distal biceps reconstruction procedure given the chronicity of the distal biceps rupture.  The patient had an atrophic distal biceps tendon which was significantly retracted.  This required additional time in the operating room for an extended exposure and to incorporate the Achilles allograft into the biceps musculature with residual scarred tendon.     SURGEON:  MADELYN Cisneros MD     ASSISTANTS:  Ravinder Reich - Fellow  SMA Marguerite     ANESTHESIA: General with local anesthetic injection (0.5% Marcaine plain)     ESTIMATED BLOOD LOSS: 20 cc     IMPLANTS:   Implant Name Type Inv. Item Serial No.  Lot No. LRB No. Used Action   SYS IMPL DEL DISTAL BICEPS BC - [id number]  SYS IMPL DEL DISTAL BICEPS BC  [employer] [id number] Left 1 Implanted   SCREW PEEK TENODESIS 7 X10MM - [id number]  SCREW PEEK TENODESIS 7 X10MM  [employer] [id number] Left 1 Implanted   [id number]  TENDON ACHILLES W/CALC 19.5CM [id number] MTF  Left 1 Implanted      FINDINGS:  Chronic, retracted distal biceps rupture which was not primarily repairable.  There was very little residual tendon left. Biceps musculature was of good quality.     SPECIMENS: None.     COMPLICATIONS: None.      INTRAOPERATIVE COUNTS: Correct.      PROPHYLACTIC IV ANTIBIOTICS: Given per [facility] Protocol.     INDICATIONS FOR PROCEDURE: Mitesh is a 43-year-old right-hand dominant gentleman who injured his left elbow on May 19th of this year when he fell  through an attic ceiling at work.  He has a complete distal biceps rupture.  This is a workman's compensation case with delayed presentation to me.  We have discussed treatment options and elected to proceed with expected allograft reconstruction of the distal biceps tendon given findings on MRI and chronic rupture at this point.  Full informed consent was obtained prior to proceeding.    DETAILS OF PROCEDURE:  The patient was met in the preoperative holding area.  After preoperative discussion, the operative site was identified and marked.  The patient was then brought back to the operating room and placed supine with the left arm on an arm board.  General anesthesia was induced. The left upper extremity was prepped and draped in usual sterile fashion for elbow surgery.  A sterile tourniquet was utilized in this case.     A verbal time-out was confirmed.     The right arm was exsanguinated and the tourniquet was inflated.  C-arm fluoroscopy was brought in to localize the bicipital tuberosity at the skin level.  An extended S shaped incision was made with vertical longitudinal component over the bicipital tuberosity and extension medially over the upper arm.  This extended approach was needed in this case for the reconstruction.  The incision measured approximately 10 cm in length around the patient's complex tattoo art work.  Care was taken to preserve landmarks for later suture repair.  Metzenbaum scissors were used to bluntly dissect down through the subcutaneous and fascial layers.  The lateral antebrachial cutaneous nerve was identified and protected throughout the case.  Interestingly, the lateral antebrachial cutaneous nerve was scarred to the torn biceps tendon.  Additional time was needed for successful neurolysis and mobilization of this nerve away from the torn tendon and surrounding scar.  Retractors were placed and dissection was carried down deep to the bicipital tuberosity.  There was a confluent and  extensive venous network which was encountered.  Veins were tied off with 0 Silk and ligated to allow exposure down to the bone. There were very few residual distal biceps fibers at the bicipital tuberosity which were debrided.  Otherwise the distal biceps tendon was completely absent from the proximal forearm and volar antecubital fossa.  This was a chronic, retracted tear with partial disruption of the lacertus fibrosis. Proximal dissection allowed exposure of the retracted remainder of the torn distal biceps tendon.  The tendon itself was folded up on itself and scarred in.  This was non repairable. Muscle quality otherwise was good.  Circumferential dissection around the biceps musculature was required for effective distal mobilization. Again care was taken to maintain hemostasis throughout and all interposed superficial venous structures were tied off.      A MTF Achilles allograft was selected and thawed out on the back table.  The bone block was sharply dissected off the distal tendon. The tendon was then debulked to fit a size 7. A #2 Fiberloop was then used to whipstitch and prepare a 2 cm distal portion of the tendon for distal fixation in the bicipital tuberosity.     The bicipital tuberosity was exposed once more.  A Beath pin was drilled over the anatomic attachment site and distal and slightly radial.  The forearm was held in maximum supination during the exposure of the bicipital tuberosity and for this portion of the procedure to protect the PIN. A size 8 mm reamer was then used to prepare the osseous tunnel for the allograft.  Thorough irrigation with normal saline was performed with removal of all bony fragmentation prior to graft passage. The graft was then passed into the tunnel with a cortical button fixation on the posterior aspect of the radius.  Appropriate positioning and seating of the cortical button was confirmed fluoroscopically.  The two Fiberloop suture ends attached to the button were  then tied to secure fixation.  A backup tenodesis screw was placed within the tunnel for additional fixation.  Both limbs of the Fiberloop suture were then passed back through the graft with a Ludwig needle and again tied back to to each other to provide a final backup fixation method for the reconstruction. The distal tendon allograft fixation was quite secure.        The fanned out portion of the Achilles allograft was then folded over on itself by half and a knife was used to split the middle portion of the graft. The residual stump of the torn distal biceps tendon was retrieved through this split and brought through the graft. #2 Fiberwire suture was then used to secure the torn tendon to the graft with multiple figure-of-eight suture knots all the way around the graft and biceps musculature.  The more proximal found out portion of the graft was then secured to the fascial edges and directly to the distal biceps musculature for additional fixation.  Again multiple knots were tied.  The Achilles allograft was tensioned and secured to the native distal biceps with the elbow held in 70° of flexion. After secure fixation of the graft to the native biceps was confirmed, thorough irrigation with normal saline was again performed.  The tourniquet was released.  There were no significant bleeders.  Hemostasis was achieved.  Again throughout the case care was taken to preserve all neurovascular structures.     1 gram of vancomycin powder was placed deep over the wound. The wound was then closed in layers with 0 Vicryl used to close the fascial layers over the distal biceps musculature and achilles graft followed by 3-0 Vicryl to close the subcutaneous tissue layer followed by 3-0 Nylon x 2 in a running farmer's stitch fashion. Sterile dressings include Xeroform 4x4s, a sterile ABD pad over the posterior aspect of the elbow, and cotton cast padding.  A posterior long-arm splint was placed with the elbow in 90° of flexion  and a sugar-tong component also was placed to hold the forearm in supination.     At the conclusion of the procedure, the patient was found to have soft compartments in the forearm and hand. Good perfusion into the hand was confirmed as well.     The patient was extubated and transferred to the postanesthesia care unit in stable condition.     POSTOPERATIVE PLAN:  We will see the patient back in 2 weeks for splint removal.  We will keep him in his splint just a bit longer in this case to protect our reconstruction.  After that time, we will fashion a removable posterior long-arm thermoplastic splint that he will wear for an additional 4 weeks.  We will begin range of motion exercises after 4 weeks. I will advise lifting no more than 5 pounds with the affected extremity until 3 months postoperatively. Between 3 and 6 months, progressively more weight will be added until unlimited activity is permitted after 6 months.

## 2023-08-16 ENCOUNTER — PATIENT MESSAGE (OUTPATIENT)
Dept: SPORTS MEDICINE | Facility: CLINIC | Age: 43
End: 2023-08-16
Payer: COMMERCIAL

## 2023-08-18 ENCOUNTER — TELEPHONE (OUTPATIENT)
Dept: SPORTS MEDICINE | Facility: CLINIC | Age: 43
End: 2023-08-18
Payer: COMMERCIAL

## 2023-08-22 ENCOUNTER — OFFICE VISIT (OUTPATIENT)
Dept: SPORTS MEDICINE | Facility: CLINIC | Age: 43
End: 2023-08-22
Payer: COMMERCIAL

## 2023-08-22 ENCOUNTER — CLINICAL SUPPORT (OUTPATIENT)
Dept: REHABILITATION | Facility: HOSPITAL | Age: 43
End: 2023-08-22
Payer: OTHER MISCELLANEOUS

## 2023-08-22 VITALS
HEIGHT: 69 IN | HEART RATE: 64 BPM | SYSTOLIC BLOOD PRESSURE: 124 MMHG | BODY MASS INDEX: 30.36 KG/M2 | WEIGHT: 205 LBS | DIASTOLIC BLOOD PRESSURE: 86 MMHG

## 2023-08-22 DIAGNOSIS — S46.212A RUPTURE OF LEFT DISTAL BICEPS TENDON, INITIAL ENCOUNTER: Primary | ICD-10-CM

## 2023-08-22 DIAGNOSIS — S46.212A RUPTURE OF LEFT DISTAL BICEPS TENDON, INITIAL ENCOUNTER: ICD-10-CM

## 2023-08-22 DIAGNOSIS — M25.622 DECREASED RANGE OF MOTION OF LEFT ELBOW: ICD-10-CM

## 2023-08-22 DIAGNOSIS — Z98.890 POST-OPERATIVE STATE: ICD-10-CM

## 2023-08-22 DIAGNOSIS — M25.522 LEFT ELBOW PAIN: ICD-10-CM

## 2023-08-22 PROCEDURE — 99999 PR PBB SHADOW E&M-EST. PATIENT-LVL III: CPT | Mod: PBBFAC,,, | Performed by: PHYSICIAN ASSISTANT

## 2023-08-22 PROCEDURE — 1159F MED LIST DOCD IN RCRD: CPT | Mod: CPTII,S$GLB,, | Performed by: PHYSICIAN ASSISTANT

## 2023-08-22 PROCEDURE — 3008F PR BODY MASS INDEX (BMI) DOCUMENTED: ICD-10-PCS | Mod: CPTII,S$GLB,, | Performed by: PHYSICIAN ASSISTANT

## 2023-08-22 PROCEDURE — 3074F SYST BP LT 130 MM HG: CPT | Mod: CPTII,S$GLB,, | Performed by: PHYSICIAN ASSISTANT

## 2023-08-22 PROCEDURE — 1160F PR REVIEW ALL MEDS BY PRESCRIBER/CLIN PHARMACIST DOCUMENTED: ICD-10-PCS | Mod: CPTII,S$GLB,, | Performed by: PHYSICIAN ASSISTANT

## 2023-08-22 PROCEDURE — 99024 POSTOP FOLLOW-UP VISIT: CPT | Mod: S$GLB,,, | Performed by: PHYSICIAN ASSISTANT

## 2023-08-22 PROCEDURE — 3079F PR MOST RECENT DIASTOLIC BLOOD PRESSURE 80-89 MM HG: ICD-10-PCS | Mod: CPTII,S$GLB,, | Performed by: PHYSICIAN ASSISTANT

## 2023-08-22 PROCEDURE — 97760 ORTHOTIC MGMT&TRAING 1ST ENC: CPT

## 2023-08-22 PROCEDURE — 3074F PR MOST RECENT SYSTOLIC BLOOD PRESSURE < 130 MM HG: ICD-10-PCS | Mod: CPTII,S$GLB,, | Performed by: PHYSICIAN ASSISTANT

## 2023-08-22 PROCEDURE — 1160F RVW MEDS BY RX/DR IN RCRD: CPT | Mod: CPTII,S$GLB,, | Performed by: PHYSICIAN ASSISTANT

## 2023-08-22 PROCEDURE — 99024 PR POST-OP FOLLOW-UP VISIT: ICD-10-PCS | Mod: S$GLB,,, | Performed by: PHYSICIAN ASSISTANT

## 2023-08-22 PROCEDURE — 3079F DIAST BP 80-89 MM HG: CPT | Mod: CPTII,S$GLB,, | Performed by: PHYSICIAN ASSISTANT

## 2023-08-22 PROCEDURE — L3763 EWHO RIGID W/O JNTS CF: HCPCS

## 2023-08-22 PROCEDURE — 3008F BODY MASS INDEX DOCD: CPT | Mod: CPTII,S$GLB,, | Performed by: PHYSICIAN ASSISTANT

## 2023-08-22 PROCEDURE — 99999 PR PBB SHADOW E&M-EST. PATIENT-LVL III: ICD-10-PCS | Mod: PBBFAC,,, | Performed by: PHYSICIAN ASSISTANT

## 2023-08-22 PROCEDURE — 1159F PR MEDICATION LIST DOCUMENTED IN MEDICAL RECORD: ICD-10-PCS | Mod: CPTII,S$GLB,, | Performed by: PHYSICIAN ASSISTANT

## 2023-08-22 NOTE — PROGRESS NOTES
S:Mitesh Aquino presents for post-operative evaluation.     DATE OF PROCEDURE: 8/9/2023      PROCEDURES PERFORMED:   Left distal biceps open achilles allograft reconstruction (CPT 28971-30)    Mitesh Aquino reports to be doing well 2wk s/p the above mentioned procedure. Denies fevers, chills, night sweats, chest pain, difficulty breathing, calf pain or tenderness. Going to OT today at [facility] after this appointment to be fitted and transitioned into a thermoplastic splint. Seeing good progress daily. Pain levels are improving. Has d/c'd pain medication. Taking Indocin.    O: Splint removed. The incision is healing well.  No signs of infection.  Suture removed. No significant pain or unusual tenderness.    A/P: Incisions healing well. Ok to shower with incisions uncovered. No submerging at this point. Follow up in OT after this for thermoplastic splint placement. RTC 1 week with me for wound check. No ROM at this point. Plan to follow the rehab plan as previously outlined.     POSTOPERATIVE PLAN:  We will see the patient back in 2 weeks for splint removal.  We will keep him in his splint just a bit longer in this case to protect our reconstruction.  After that time, we will fashion a removable posterior long-arm thermoplastic splint that he will wear for an additional 4 weeks.  We will begin range of motion exercises after 4 weeks. I will advise lifting no more than 5 pounds with the affected extremity until 3 months postoperatively. Between 3 and 6 months, progressively more weight will be added until unlimited activity is permitted after 6 months.

## 2023-08-22 NOTE — PATIENT INSTRUCTIONS
[facility], OCCUPATIONAL THERAPY  HOME EXERCISE PROGRAM     Complete the following exercises for 10 repetitions each, 4x/day:                          Hook                                     Table Top                 Straight Fist                                 Full Fist      Perform hook fist by bending only at the top two joints. Keep the big knuckles straight. Return to straight hand.  Perform table top position by bending at the large knuckles while keeping fingers straight like a table top  Continue to straight fist by bending at the large and middle knuckles, keeping finger tips straight (as if reaching for bottom of palm with finger tips)        Return to straight hand.  4.   Bend fingers into a full fist.        AROM: Wrist Flexion / Extension               Bend your wrist forward and back..Do not move the forearm or elbow          AROM: Wrist Radial / Ulnar Deviation  Bend your wrist from side to side. Do not move the forearm or elbow      Therapist: GALILEA Gutierres/JOCELYNN

## 2023-08-23 NOTE — PROGRESS NOTES
"[facility] Occupational Therapy  Orthosis Fabrication     Date: 8/22/2023  Name: Mitesh Aquino  Clinic Number: [id number]    Therapy Diagnosis:   1. Rupture of left distal biceps tendon, initial encounter  Ambulatory referral/consult to Physical/Occupational Therapy      2. Left elbow pain        3. Decreased range of motion of left elbow          Medical Diagnosis: Z98.890 (ICD-10-CM) - Post-operative state S46.212A (ICD-10-CM) - Rupture of left distal biceps tendon, initial encounter     Referring Physician: Abhay Camilo*  Physician Orders: OT orthotic fabrication  Note:    Patient underwent L distal biceps reconstruction DOS 8/9/23. Need to start post op OT 2 weeks post op. Splint removed in regular ortho clinic then to OT for thermoplastic splint fabrication.    Date of Injury: 5/19/23  Date of Surgery: 8/9/2023   PROCEDURES PERFORMED:   Left distal biceps open achilles allograft reconstruction   Date of Return to MD: 8.28.32    Time In: 1:00pm  Time Out: 1:55pm    PROCEDURES:    UNTIMED  Procedure Min.    -         Total Timed Minutes:  15 min  Total Timed Units:  1  Total Untimed Units:  1  Charges Billed/# of units:    x 1    Subjective:     History of Current Condition: Mitesh Aquino is a 43 y.o. year old Right hand dominant male who sustained a Left distal biceps rupture at work on 5/19/23. 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 He elected to undergo surgery at that time which consisted of allograft distal biceps reconstruction on 8/9/23. Mitesh Aquino is referred to Occupational Therapy for orthotic fabrication.       Objective:     Patient seen by OT this session for fabrication of orthosis per MD orders.  Patient presents with sling, and dressing over the incision site    Placed a stockinette over the arm, padding about the " elbow  Fabricated a posterior long arm orthotic to include elbow in 90* flexion, forearm slight supination, wrist slight extension and up to DPC of hand, allowing all digits and thumb free.    Treatment:     Orthotic Fit and Training, 15 minutes:  Instructed in orthosis wear and care, to be worn at all times - recommended to be worn during hygiene but patient reports at MD follow up today, he was told he may remove for hygiene  Instructed to monitor for skin redness/irritation or any pressure areas and contact for modification if irritation occurs    Provided with an performed HEP: tendon glides, wrist flex/ext, UD/RD  Instructed in precautions: no lifting more than a coffee cup, no elbow or forearm ROM at this time    Assessment:     Mitesh Aquino has a well fitting orthosis post therapy and demonstrates independence with donning/doffing. Patient shows good understanding of wear and care, precautions, and knows to call with any questions or concerns.    Plans and Goals:     Goal of Orthosis to protect the surgery site.   Mitesh Aquino is to return in 2 weeks (4 weeks post-op) for initial OT eval and initiate elbow and forearm ROM. Return as needed in the meanwhile for orthotic adjustment      Cintia Davison OTR/L

## 2023-08-25 ENCOUNTER — PATIENT MESSAGE (OUTPATIENT)
Dept: SPORTS MEDICINE | Facility: CLINIC | Age: 43
End: 2023-08-25
Payer: COMMERCIAL

## 2023-08-28 ENCOUNTER — OFFICE VISIT (OUTPATIENT)
Dept: SPORTS MEDICINE | Facility: CLINIC | Age: 43
End: 2023-08-28
Payer: COMMERCIAL

## 2023-08-28 VITALS
HEART RATE: 67 BPM | SYSTOLIC BLOOD PRESSURE: 139 MMHG | WEIGHT: 205 LBS | HEIGHT: 69 IN | BODY MASS INDEX: 30.36 KG/M2 | DIASTOLIC BLOOD PRESSURE: 89 MMHG

## 2023-08-28 DIAGNOSIS — S46.212A RUPTURE OF LEFT DISTAL BICEPS TENDON, INITIAL ENCOUNTER: ICD-10-CM

## 2023-08-28 DIAGNOSIS — Z98.890 POST-OPERATIVE STATE: Primary | ICD-10-CM

## 2023-08-28 PROCEDURE — 99024 POSTOP FOLLOW-UP VISIT: CPT | Mod: S$GLB,,, | Performed by: PHYSICIAN ASSISTANT

## 2023-08-28 PROCEDURE — 1160F RVW MEDS BY RX/DR IN RCRD: CPT | Mod: CPTII,S$GLB,, | Performed by: PHYSICIAN ASSISTANT

## 2023-08-28 PROCEDURE — 99999 PR PBB SHADOW E&M-EST. PATIENT-LVL III: ICD-10-PCS | Mod: PBBFAC,,, | Performed by: PHYSICIAN ASSISTANT

## 2023-08-28 PROCEDURE — 1160F PR REVIEW ALL MEDS BY PRESCRIBER/CLIN PHARMACIST DOCUMENTED: ICD-10-PCS | Mod: CPTII,S$GLB,, | Performed by: PHYSICIAN ASSISTANT

## 2023-08-28 PROCEDURE — 99024 PR POST-OP FOLLOW-UP VISIT: ICD-10-PCS | Mod: S$GLB,,, | Performed by: PHYSICIAN ASSISTANT

## 2023-08-28 PROCEDURE — 99999 PR PBB SHADOW E&M-EST. PATIENT-LVL III: CPT | Mod: PBBFAC,,, | Performed by: PHYSICIAN ASSISTANT

## 2023-08-28 PROCEDURE — 1159F MED LIST DOCD IN RCRD: CPT | Mod: CPTII,S$GLB,, | Performed by: PHYSICIAN ASSISTANT

## 2023-08-28 PROCEDURE — 1159F PR MEDICATION LIST DOCUMENTED IN MEDICAL RECORD: ICD-10-PCS | Mod: CPTII,S$GLB,, | Performed by: PHYSICIAN ASSISTANT

## 2023-08-28 PROCEDURE — 3079F PR MOST RECENT DIASTOLIC BLOOD PRESSURE 80-89 MM HG: ICD-10-PCS | Mod: CPTII,S$GLB,, | Performed by: PHYSICIAN ASSISTANT

## 2023-08-28 PROCEDURE — 3079F DIAST BP 80-89 MM HG: CPT | Mod: CPTII,S$GLB,, | Performed by: PHYSICIAN ASSISTANT

## 2023-08-28 PROCEDURE — 3008F BODY MASS INDEX DOCD: CPT | Mod: CPTII,S$GLB,, | Performed by: PHYSICIAN ASSISTANT

## 2023-08-28 PROCEDURE — 3008F PR BODY MASS INDEX (BMI) DOCUMENTED: ICD-10-PCS | Mod: CPTII,S$GLB,, | Performed by: PHYSICIAN ASSISTANT

## 2023-08-28 PROCEDURE — 3075F SYST BP GE 130 - 139MM HG: CPT | Mod: CPTII,S$GLB,, | Performed by: PHYSICIAN ASSISTANT

## 2023-08-28 PROCEDURE — 3075F PR MOST RECENT SYSTOLIC BLOOD PRESS GE 130-139MM HG: ICD-10-PCS | Mod: CPTII,S$GLB,, | Performed by: PHYSICIAN ASSISTANT

## 2023-08-28 NOTE — PROGRESS NOTES
S:Mitesh Aquino presents for post-operative evaluation.     DATE OF PROCEDURE: 8/9/2023      PROCEDURES PERFORMED:   Left distal biceps open achilles allograft reconstruction (CPT 58651-92)    Mitesh Aquino reports to be doing well 3wk s/p the above mentioned procedure. Here today for wound check. Denies fevers, chills, night sweats, chest pain, difficulty breathing, calf pain or tenderness. He is wearing his thermoplastic splint. Seeing good progress daily. No pain. Taking Indocin.    O: Splint removed. The incision is well healedl.  No signs of infection. No significant pain or unusual tenderness.    A/P: Incisions healing well. Ok to shower with incisions uncovered. No submerging at this point. Continue thermoplastic splint, ROM begins at 4 weeks post op. RTC 3 weeks with Dr. Mccall. No ROM at this point. Plan to follow the rehab plan as previously outlined.     POSTOPERATIVE PLAN:  We will see the patient back in 2 weeks for splint removal.  We will keep him in his splint just a bit longer in this case to protect our reconstruction.  After that time, we will fashion a removable posterior long-arm thermoplastic splint that he will wear for an additional 4 weeks.  We will begin range of motion exercises after 4 weeks. I will advise lifting no more than 5 pounds with the affected extremity until 3 months postoperatively. Between 3 and 6 months, progressively more weight will be added until unlimited activity is permitted after 6 months.

## 2023-09-06 ENCOUNTER — PATIENT MESSAGE (OUTPATIENT)
Dept: SPORTS MEDICINE | Facility: CLINIC | Age: 43
End: 2023-09-06
Payer: COMMERCIAL

## 2023-09-06 ENCOUNTER — CLINICAL SUPPORT (OUTPATIENT)
Dept: REHABILITATION | Facility: HOSPITAL | Age: 43
End: 2023-09-06
Payer: OTHER MISCELLANEOUS

## 2023-09-06 DIAGNOSIS — M25.522 LEFT ELBOW PAIN: Primary | ICD-10-CM

## 2023-09-06 DIAGNOSIS — M25.622 DECREASED RANGE OF MOTION OF LEFT ELBOW: ICD-10-CM

## 2023-09-06 PROCEDURE — 97110 THERAPEUTIC EXERCISES: CPT

## 2023-09-06 PROCEDURE — 97166 OT EVAL MOD COMPLEX 45 MIN: CPT

## 2023-09-06 NOTE — PATIENT INSTRUCTIONS
[facility], OCCUPATIONAL THERAPY  HOME EXERCISE PROGRAM      Complete scar massage, 3-5 minutes, 4 times a day:                 May use a thick lotion. Rub in circular, up and down, side to side motions for about 5 minutes total.    Complete the following exercises for 10 repetitions, 8x/day:             Start with erect posture. Perform shoulder circles        Maintaining erect posture, draw shoulders back (squeezing the shoulder blades together)                       Elbow Flexion / Extension        Bend and straighten elbow in 3 different positions:   thumb up, palm up, palm down.    **Use the Right hand to passively bend the elbow to tolerable range  Then use Right hand to assist in straighten the elbow to tolerable range - no pain!!          AROM: Supination / Pronation   With your elbow 90* by your side  Use your Right hand to passively turn palm up  And then Right hand to assist with turning palm down to a tolerable range - no pain!!           AROM: Wrist Flexion / Extension               Bend your wrist forward and back as far as possible.  Fingers should be relaxed          AROM: Wrist Radial / Ulnar Deviation  Bend your wrist from side to side as far as possible.  (Sliding side to side like windshield wipers)      Therapist: GALILEA Gutierres/L     Copyright © [facility]. All rights reserved.

## 2023-09-07 NOTE — PLAN OF CARE
"  [facility] Occupational Therapy  Initial Evaluation     Date: 9/6/2023  Patient: Mitesh Aquino  Chart Number: [id number]    Therapy Diagnosis:   1. Left elbow pain        2. Decreased range of motion of left elbow          Medical Diagnosis: Z98.890 (ICD-10-CM) - Post-operative state S46.212A (ICD-10-CM) - Rupture of left distal biceps tendon, initial encounter     Referring Physician: Abhay Camilo*  Physician Orders: OT orthotic fabrication  Note:    Patient underwent L distal biceps reconstruction DOS 8/9/23. Need to start post op OT 2 weeks post op. Splint removed in regular ortho clinic then to OT for thermoplastic splint fabrication.  Date of Return to MD: 9/21/23    Date of Injury: 5/19/23  Date of Surgery: 8/9/2023   PROCEDURES PERFORMED:   Left distal biceps open achilles allograft reconstruction     Evaluation Date: 9/6/2023  Authorization Period: 8/21/23 - 8/6/24  Plan of Care Expiration: 11/29/23  Visit #/ Visits Authorized: 1 of 1  FOTO Completion: Initial eval (9/6/2023)  FOTO #2:  FOTO #3:    Time In: 11:00 am  Time Out: 11:50 am  Total Appointment Time (timed & untimed codes): 50 min    Precautions: Standard, tendon protocol - per PA, may perform ROM as tolerated at 4 weeks post op    Subjective     History of Current Condition: Mitesh Aquino is a 43 y.o. year old Right hand dominant male who sustained a Left distal biceps rupture at work on 5/19/23 when he slipped from a raftor and grabbed the beam above him and felt a pop. 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 He elected to undergo surgery at that time which consisted of allograft distal biceps reconstruction on 8/9/23. Mitesh Aquino is referred to Occupational Therapy for evaluation and treatment.     Falls: none    Involved Side: Left  Dominant Side: Right    Date of Onset: 5/19/23; sx on " "8/9/23  Imaging: MRI  Impression:  Findings consistent with chronic tear distal biceps tendon given nonvisualization at the level the radial tuberosity and stump of retracted biceps approximately 8 cm proximal (level of the distal humerus).  Previous Therapy: OT for orthotic fabrication    Pain:  Functional Pain Scale Rating 0-10:   Current: 0/10  At Best: 0/10  At Worst: 0/10    Location: Patient reports no pain  Description: reports soreness at times in proximal forearm from the orthosis straps  Aggravating Factors: tight straps from the orthosis  Easing Factors: rest    Functional Limitations/Social History:  Prior Level of Function: Independent with all ADLs/IADLs    Current Level of Function: Lives with wife, two sons, and dog  ADLs: remains independent, using primarily RUE and adapting movements to limit LUE use/movement  IADLs: difficulty with bilateral tasks and those involving lifting anything more than a coffee cup - assistance from wife/son for tasks  Leisure: Assistance from sons for working on cars, enjoys racing cars, fishing  Driving: No    Occupation:  Sales for [employer]  Working presently: employed - been doing light activity with Right hand only  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    Mitesh Aquino  has a past surgical history that includes Tonsillectomy; knee scope (Bilateral); and repair, tendon, arthroscopic, with conversion to open tendon repair if indicated (Left, 8/9/2023).    Mitesh has a current medication list which includes the following prescription(s): aspirin, indomethacin, ondansetron, and oxycodone, and the following Facility-Administered Medications: fentanyl, haloperidol lactate, and sodium chloride 0.9%.    Review of patient's allergies indicates: "   Allergen Reactions    Poison ivy extract           Objective     Mental status: alert, oriented x3    Observation/Appearance:   11.5 cm incision in length along anterior elbow, fully closed, sutures removed, no signs of infection. Thick with limited mobility  Mild swelling about elbow    Sensation: Patient denies numbness/tingling      Edema:  (Measured circumferential, in centimeters)  Hand/Wrist: Right  9/6/2023 Left  9/6/2023   Wrist Crease 16.0 cm 16.3 cm     Elbow: Right  9/6/2023 Left  9/6/2023   3 Inches distal to elbow crease 25 cm 22.3 cm   Elbow crease 27.8 cm 29 cm   3 Inches proximal to elbow crease 27.5 cm 26.5 cm       ELBOW, WRIST RANGE OF MOTION:   Measured in degrees of active motion with goniometer    All shoulder ROM is WNL, Full digit and thumb ROM bilaterally     Right  9/6/23 Left  9/6/23   Elbow Extension/Flexion -5/150 -50/140  (Neutral forearm, AAROM/PROM)   Pronation/Supination 80/80 50/65  (Active/passive)   Wrist Extension/Flexion 70/85 55/55   Ulnar/Radial Deviation 35/15 10/20   Composite Wrist Extension/Flexion NT NT       STRENGTH: Not tested due to precautions  (Measured in pounds using a Dynamometer and pinch meter)   Right  9/6/2023 Left  9/6/2023    Setting 2      Average     Key     3 Pt     Tip         Intake Outcome Measure for FOTO Initial Evaluation Survey    Therapist reviewed FOTO scores for Mitesh Aquino on 9/6/2023.   FOTO documents entered into [employer] - see Media section.    Intake Score: 66%         Treatment     Treatment Time In: 11:30 am  Treatment Time Out: 11:50 am  Total Treatment time separate from Evaluation time: 20 min    Mitesh received the following manual therapy techniques for 5 minutes:   - STM about scar and surrounding area    Mitesh performed therapeutic exercises for 15 minutes including:  Provided with HEP and performed - instructed to perform all in pain free range:  - AROM shoulder pro/retract, circles (10 ea)  - AROM: passive elbow  flexion/active assisted extension in 3 forearm positions , active assist pronation/passive supination at 90* elbow flexion (10 ea)  - AROM wrist flex/ext, UD/RD    Reviewed precautions, no use of the LUE, wear orthotic at all times except HEP and hygiene, no lifting more than a coffee cup    Patient Education and Home Exercises     Patient/Family Education Provided:   - Role of OT, goals for OT, scheduling/cancellations - pt verbalized understanding. Discussed insurance limitations with patient.    Written Home Exercises Provided: yes.  Exercises were reviewed and Mitesh was able to demonstrate them prior to the end of the session.  Mitesh demonstrated good  understanding of the education provided. See EMR under Patient Instructions for exercises provided during therapy sessions.     Pt was advised to perform these exercises free of pain, and to stop performing them if pain occurs.      Assessment     Mitesh Aquino is a 43 y.o. male referred to outpatient occupational therapy and presents with a medical diagnosis of Rupture of left distal biceps tendon.      Assessment of Occupational Performance   Performance Deficits    Physical:  Joint Mobility  Muscle Power/Strength  Muscle Endurance  Skin Integrity/Scar Formation  Edema   Strength  Proprioception Functions  Pain    Cognitive:  No Deficits    Psychosocial:    Habits  Routines  Rituals     Following medical record review it is determined that pt will benefit from occupational therapy services in order to maximize pain free and/or functional use of left elbow/forearm. The following goals were discussed with the patient and patient is in agreement with them as to be addressed in the treatment plan. The patient's rehab potential is Good.     Anticipated barriers to occupational therapy: time since onset of injury, surgical repair, mechanism of injury    Plan of care discussed with patient: Yes  Patient's spiritual, cultural and educational needs considered and  patient is agreeable to the plan of care and goals as stated below:     Medical Necessity is demonstrated by the following  Occupational Profile/History  Co-morbidities and personal factors that may impact the plan of care [] LOW: Brief chart review  [x] MODERATE: Expanded chart review   [] HIGH: Extensive chart review    Moderate / High Support Documentation: mechanism of injury, delayed repair     Examination  Performance deficits relating to physical, cognitive or psychosocial skills that result in activity limitations and/or participation restrictions  [] LOW: addressing 1-3 Performance deficits  [x] MODERATE: 3-5 Performance deficits  [] HIGH: 5+ Performance deficits (please support below)    Moderate / High Support Documentation: mechanism of injury, delayed repair     Treatment Options [] LOW: Limited options  [] MODERATE: Several options  [x] HIGH: Multiple options      Decision Making/ Complexity Score: moderate       The following goals were discussed with the patient and patient is in agreement with them as to be addressed in the treatment plan.     Long Term Goals (to be met by discharge):  Date Goal Met:     1.) Mitesh Aquino will demonstrate significantly improved functional performance from re-assessment as measured by a FOTO Intake score of more than 75.    Goal Status:   In progress    2.) Mitesh Aquino will return to near to prior level of function for ADLs and household management reporting independence or modified independence.    Goal Status:   In progress    3. Mitesh Aquino will report pain 0 out of 10 at worst to increase functional use of LUE for work and leisure tasks.    Goal Status:   In progress     Short Term Goals (to be met by 10/19/23):  Date Goal Met:     Mitesh Aquino will be independent with home exercise program with written instructions.    Goal Status:   In progress    Mitesh Aquino will demonstrate -10/140 degrees of elbow ext/flex to improve functional performance in  ADLs/work/leisure tasks.    Goal Status:   In progress    Mitesh Aquino will demonstrate within 20 lbs of  strength compared to other side to improve functional grasp for ADLs/work/leisure tasks.    Goal Status:   In progress    Mitesh Aquino will demonstrate the ability to complete ADL/IADL tasks with 2/10 pain.    Goal Status:   In progress       Plan     Pt to be treated by Occupational Therapy 3 times per week for 12 weeks during the certification period from 9/6/2023 to 11/29/23 to achieve the established goals.     Treatment to include: Paraffin, Fluidotherapy, Manual therapy/joint mobilizations, Modalities for pain management, US 3 mhz, Therapeutic exercises/activities., Iontophoresis with 2.0 cc Dexamethasone, Strengthening, Orthotic Fabrication/Fit/Training, Edema Control, Scar Management, Wound Care, Electrical Modalities, Joint Protection, and Energy Conservation, as well as any other treatments deemed necessary based on the patient's needs or progress.       Cintia Davison, OTR/L

## 2023-09-08 ENCOUNTER — CLINICAL SUPPORT (OUTPATIENT)
Dept: REHABILITATION | Facility: HOSPITAL | Age: 43
End: 2023-09-08
Payer: OTHER MISCELLANEOUS

## 2023-09-08 DIAGNOSIS — M25.622 DECREASED RANGE OF MOTION OF LEFT ELBOW: ICD-10-CM

## 2023-09-08 DIAGNOSIS — M25.522 LEFT ELBOW PAIN: Primary | ICD-10-CM

## 2023-09-08 PROCEDURE — 97140 MANUAL THERAPY 1/> REGIONS: CPT

## 2023-09-08 PROCEDURE — 97110 THERAPEUTIC EXERCISES: CPT

## 2023-09-08 NOTE — PROGRESS NOTES
"  [facility]  Occupational Therapy Treatment Note    Date: 9/8/2023  Name: Mitesh Aquino  Clinic Number: [id number]    Therapy Diagnosis:   Encounter Diagnoses   Name Primary?    Left elbow pain Yes    Decreased range of motion of left elbow      Physician: Abhay Camilo*    Physician Orders: Physician Orders: OT orthotic fabrication  Note:    Patient underwent L distal biceps reconstruction DOS 8/9/23. Need to start post op OT 2 weeks post op. Splint removed in regular ortho clinic then to OT for thermoplastic splint fabrication  Medical Diagnosis:  Z98.890 (ICD-10-CM) - Post-operative state S46.212A (ICD-10-CM) - Rupture of left distal biceps tendon, initial encounter      Date of Injury: 5/19/23  Date of Surgery: 8/9/2023   PROCEDURES PERFORMED:   Left distal biceps open achilles allograft reconstruction      Evaluation Date: 9/6/2023  Authorization Period: 8/21/23 - 8/6/24  Plan of Care Expiration: 11/29/23  Visit #/ Visits Authorized: 2/12  FOTO Completion: Initial eval (9/6/2023)  FOTO #2:  FOTO #3:  FOTO: date and score     Precautions:  Standard, tendon protocol - per PA, may perform ROM as tolerated at 4 weeks post op    Time In: 8:35 am  Time Out: 9:22 am  Total Billable Time: 40 minutes      SUBJECTIVE   History of Current Condition: Mitesh Aquino is a 43 y.o. year old Right hand dominant male who sustained a Left distal biceps rupture at work on 5/19/23 when he slipped from a raftor and grabbed the beam above him and felt a pop. 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 He elected to undergo surgery at that time which consisted of allograft distal biceps reconstruction on 8/9/23    Involved Side: Left  Dominant Side: Right     Pt reports: increase in passive flex  He was compliant with home exercise program given last session.   Response to " previous treatment:increase in passive flex  Functional change: NA     Pain: 0/10  Location: left elbow    OBJECTIVE   Objective Measures updated at progress report unless specified.    Mental status: alert, oriented x3     Observation/Appearance:   11.5 cm incision in length along anterior elbow, fully closed, sutures removed, no signs of infection. Thick with limited mobility  Mild swelling about elbow     Sensation: Patient denies numbness/tingling        Edema:  (Measured circumferential, in centimeters)  Hand/Wrist: Right  9/6/2023 Left  9/6/2023   Wrist Crease 16.0 cm 16.3 cm      Elbow: Right  9/6/2023 Left  9/6/2023   3 Inches distal to elbow crease 25 cm 22.3 cm   Elbow crease 27.8 cm 29 cm   3 Inches proximal to elbow crease 27.5 cm 26.5 cm         ELBOW, WRIST RANGE OF MOTION: Measured in degrees of active motion with goniometer     All shoulder ROM is WNL, Full digit and thumb ROM bilaterally       Right  9/6/23 Left  9/6/23   Elbow Extension/Flexion -5/150 -50/140  (Neutral forearm, AAROM/PROM)   Pronation/Supination 80/80 50/65  (Active/passive)   Wrist Extension/Flexion 70/85 55/55   Ulnar/Radial Deviation 35/15 10/20   Composite Wrist Extension/Flexion NT NT         STRENGTH: Not tested due to precautions  (Measured in pounds using a Dynamometer and pinch meter)    Right  9/6/2023 Left  9/6/2023    Setting 2        Average       Key       3 Pt       Tip                   Treatment     Mitesh received the treatments listed below:     Supervised modalities after being cleared for contradictions: Hot Pack - x 10 min to increase soft tissue extensibility      Direct contact modalities after being cleared for contraindications: NT    Manual therapy techniques: Manual Lymphatic Drainage, Soft tissue Mobilization, and Friction Massage were applied to the: sx site and elbow for 10 minutes,    Therapeutic exercises to develop ROM and flexibility for 20 minutes, including:  Passive elbow flex, active  elbow extension in comfortable range x 10 reps  Passive supination followed by active pro x 10 reps  AROM wrist flex/extension, r/ud 10 reps ea  Scapular retraction x 10 reps  Shoulder circles x 10 reps ea way  Reviewed HEP    Therapeutic activities to improve functional performance for NT   Neuromuscular re-education activities to improve NT f  Patient Education and Home Exercises      Education provided:   - Reviewed HEP  - Progress towards goals     Written Home Exercises Provided: Patient instructed to cont prior HEP.  Exercises were reviewed and Mitesh was able to demonstrate them prior to the end of the session.  Mitesh demonstrated good  understanding of the HEP provided. See EMR under Patient Instructions for exercises provided during therapy sessions.      ASSESSMENT      Mitesh shows compliance with wear of orthosis and HEP. Subjective report of increase in passive elbow flexion     Mitesh is progressing well towards his goals and there are no updates to goals at this time. Pt prognosis is Excellent.     Pt will continue to benefit from skilled outpatient occupational therapy to address the deficits listed in the problem list on initial evaluation, provide pt/family education and to maximize pt's level of independence in the home and community environment.     Pt's spiritual, cultural and educational needs considered and pt agreeable to plan of care and goals.    Anticipated barriers to occupational therapy: none    Goals:  Long Term Goals (to be met by discharge):  Date Goal Met:       1.) Mitesh Aquino will demonstrate significantly improved functional performance from re-assessment as measured by a FOTO Intake score of more than 75.     Goal Status:   In progress     2.) Mitesh Aquino will return to near to prior level of function for ADLs and household management reporting independence or modified independence.     Goal Status:   In progress     3. Mitesh Aquino will report pain 0 out of 10 at worst to  increase functional use of LUE for work and leisure tasks.     Goal Status:   In progress      Short Term Goals (to be met by 10/19/23):  Date Goal Met:       Mitesh Aquino will be independent with home exercise program with written instructions.     Goal Status:   In progress     Mitesh Aquino will demonstrate -10/140 degrees of elbow ext/flex to improve functional performance in ADLs/work/leisure tasks.     Goal Status:   In progress     Mitesh Aquino will demonstrate within 20 lbs of  strength compared to other side to improve functional grasp for ADLs/work/leisure tasks.     Goal Status:   In progress     Mitesh Aquino will demonstrate the ability to complete ADL/IADL tasks with 2/10 pain.     Goal Status:   In progress         Plan      Pt to be treated by Occupational Therapy 3 times per week for 12 weeks during the certification period from 9/6/2023 to 11/29/23 to achieve the established goals.      Treatment to include: Paraffin, Fluidotherapy, Manual therapy/joint mobilizations, Modalities for pain management, US 3 mhz, Therapeutic exercises/activities., Iontophoresis with 2.0 cc Dexamethasone, Strengthening, Orthotic Fabrication/Fit/Training, Edema Control, Scar Management, Wound Care, Electrical Modalities, Joint Protection, and Energy Conservation, as well as any other treatments deemed necessary based on the patient's needs or progress.        PLAN     Continue skilled occupational therapy with individualized plan of care focusing on progression along protocol.     Updates/Grading for next session: Progress as tolerated per protocol.    Michelle Alfred, OT

## 2023-09-11 ENCOUNTER — CLINICAL SUPPORT (OUTPATIENT)
Dept: REHABILITATION | Facility: HOSPITAL | Age: 43
End: 2023-09-11
Payer: OTHER MISCELLANEOUS

## 2023-09-11 DIAGNOSIS — M25.522 LEFT ELBOW PAIN: Primary | ICD-10-CM

## 2023-09-11 DIAGNOSIS — M25.622 DECREASED RANGE OF MOTION OF LEFT ELBOW: ICD-10-CM

## 2023-09-11 PROCEDURE — 97140 MANUAL THERAPY 1/> REGIONS: CPT

## 2023-09-11 PROCEDURE — 97110 THERAPEUTIC EXERCISES: CPT

## 2023-09-11 PROCEDURE — 97010 HOT OR COLD PACKS THERAPY: CPT

## 2023-09-11 NOTE — PROGRESS NOTES
"  [facility]  Occupational Therapy Treatment Note    Date: 9/11/2023  Name: Mitesh Aquino  Clinic Number: [id number]    Therapy Diagnosis:   Encounter Diagnoses   Name Primary?    Left elbow pain Yes    Decreased range of motion of left elbow        Physician: Abhay Camilo*    Physician Orders: Physician Orders: OT orthotic fabrication  Note:    Patient underwent L distal biceps reconstruction DOS 8/9/23. Need to start post op OT 2 weeks post op. Splint removed in regular ortho clinic then to OT for thermoplastic splint fabrication  Medical Diagnosis:  Z98.890 (ICD-10-CM) - Post-operative state S46.212A (ICD-10-CM) - Rupture of left distal biceps tendon, initial encounter      Date of Injury: 5/19/23  Date of Surgery: 8/9/2023   PROCEDURES PERFORMED:   Left distal biceps open achilles allograft reconstruction      Evaluation Date: 9/6/2023  Authorization Period: 8/21/23 - 8/6/24  Plan of Care Expiration: 11/29/23  Visit #/ Visits Authorized: 2/12  FOTO Completion: Initial eval (9/6/2023)  FOTO #2:  FOTO #3:  FOTO: date and score     Precautions:  Standard, tendon protocol - per PA, may perform ROM as tolerated at 4 weeks post op    9/11/2023: Pt is 4 weeks and 5 days post-op    Time In: 11:03 am  Time Out: 11:56 am  Total Billable Time: 53 minutes      SUBJECTIVE   History of Current Condition: Mitesh Aquino is a 43 y.o. year old Right hand dominant male who sustained a Left distal biceps rupture at work on 5/19/23 when he slipped from a raftor and grabbed the beam above him and felt a pop. 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 He elected to undergo surgery at that time which consisted of allograft distal biceps reconstruction on 8/9/23    Involved Side: Left  Dominant Side: Right     Pt reports: increase in passive flex  He was compliant with " home exercise program given last session.   Response to previous treatment:increase in passive flex  Functional change: NA     Pain: 0/10  Location: left elbow    OBJECTIVE   Objective Measures updated at progress report unless specified.    Mental status: alert, oriented x3     Observation/Appearance:   11.5 cm incision in length along anterior elbow, fully closed, sutures removed, no signs of infection. Thick with limited mobility  Mild swelling about elbow     Sensation: Patient denies numbness/tingling        Edema:  (Measured circumferential, in centimeters)  Hand/Wrist: Right  9/6/2023 Left  9/6/2023   Wrist Crease 16.0 cm 16.3 cm      Elbow: Right  9/6/2023 Left  9/6/2023   3 Inches distal to elbow crease 25 cm 22.3 cm   Elbow crease 27.8 cm 29 cm   3 Inches proximal to elbow crease 27.5 cm 26.5 cm         ELBOW, WRIST RANGE OF MOTION: Measured in degrees of active motion with goniometer     All shoulder ROM is WNL, Full digit and thumb ROM bilaterally       Right  9/6/23 Left  9/6/23   Elbow Extension/Flexion -5/150 -50/140  (Neutral forearm, AAROM/PROM)   Pronation/Supination 80/80 50/65  (Active/passive)   Wrist Extension/Flexion 70/85 55/55   Ulnar/Radial Deviation 35/15 10/20   Composite Wrist Extension/Flexion NT NT         STRENGTH: Not tested due to precautions  (Measured in pounds using a Dynamometer and pinch meter)    Right   Left      Setting 2        Average       Key       3 Pt       Tip                   Treatment     Mitesh received the treatments listed below:     Supervised modalities after being cleared for contradictions: Hot Pack Left Elbow- x 10 min to increase soft tissue extensibility        Direct contact modalities after being cleared for contraindications: NT    Manual therapy techniques: 10 minutes  Manual Lymphatic Drainage, Soft tissue Mobilization, and Friction Massage were applied to the: sx site and elbow , PROM wrist flexion/ extension 10+ reps    Therapeutic  exercises to develop ROM and flexibility for 33 minutes, including:  Gentle active elbow flex/extension  with fa in 3 planes, 2/10 each way, in pain-free range  Gentle active supination/pro in pain-free range 2/10  A/AAROM wrist flex/extension 2/10  Scapular retraction x 10 reps  Shoulder circles x 10 reps ea way  Educated in updated HEP    Therapeutic activities to improve functional performance for NT   Neuromuscular re-education activities to improve NT   Patient Education and Home Exercises      Education provided:   - Reviewed HEP  - Progress towards goals     Written Home Exercises Provided: Patient instructed to cont prior HEP.  Exercises were reviewed and Mitesh was able to demonstrate them prior to the end of the session.  Mitesh demonstrated good  understanding of the HEP provided. See EMR under Patient Instructions for exercises provided during therapy sessions.      ASSESSMENT      Mitesh shows compliance with wear of orthosis and HEP. Subjective report of increase in elbow extension. Pt tolerated progress of program to arom elbow and fa in all planes without report of pain. HEP was updated accordingly     Mitesh is progressing well towards his goals and there are no updates to goals at this time. Pt prognosis is Excellent.     Pt will continue to benefit from skilled outpatient occupational therapy to address the deficits listed in the problem list on initial evaluation, provide pt/family education and to maximize pt's level of independence in the home and community environment.     Pt's spiritual, cultural and educational needs considered and pt agreeable to plan of care and goals.    Anticipated barriers to occupational therapy: none    Goals:  Long Term Goals (to be met by discharge):  Date Goal Met:       1.) Mitesh Aquino will demonstrate significantly improved functional performance from re-assessment as measured by a FOTO Intake score of more than 75.     Goal Status:   In progress     2.) Mitesh  Kenia will return to near to prior level of function for ADLs and household management reporting independence or modified independence.     Goal Status:   In progress     3. Mitesh Aquino will report pain 0 out of 10 at worst to increase functional use of LUE for work and leisure tasks.     Goal Status:   In progress      Short Term Goals (to be met by 10/19/23):  Date Goal Met:       Mitesh Aquino will be independent with home exercise program with written instructions.     Goal Status:   In progress     Mitesh Aquino will demonstrate -10/140 degrees of elbow ext/flex to improve functional performance in ADLs/work/leisure tasks.     Goal Status:   In progress     Mitesh Aquino will demonstrate within 20 lbs of  strength compared to other side to improve functional grasp for ADLs/work/leisure tasks.     Goal Status:   In progress     Mitesh Aquino will demonstrate the ability to complete ADL/IADL tasks with 2/10 pain.     Goal Status:   In progress         Plan      Pt to be treated by Occupational Therapy 3 times per week for 12 weeks during the certification period from 9/6/2023 to 11/29/23 to achieve the established goals.      Treatment to include: Paraffin, Fluidotherapy, Manual therapy/joint mobilizations, Modalities for pain management, US 3 mhz, Therapeutic exercises/activities., Iontophoresis with 2.0 cc Dexamethasone, Strengthening, Orthotic Fabrication/Fit/Training, Edema Control, Scar Management, Wound Care, Electrical Modalities, Joint Protection, and Energy Conservation, as well as any other treatments deemed necessary based on the patient's needs or progress.        PLAN     Continue skilled occupational therapy with individualized plan of care focusing on progression along protocol.     Updates/Grading for next session: reassess arom Progress as tolerated per protocol.     Michelle Alfred OT

## 2023-09-11 NOTE — PATIENT INSTRUCTIONS
[facility], OCC[facility]  HOME EXERCISE PROGRAM               Complete massage 2-3 minutes 4 times a day:        Complete 10 repetitions of each exercise 4-6 times a day:             10 with and 10 without fist          10 with and 10 without fist        Copyright © I. All rights reserved.     Therapist: GALILEA León/L, CHT

## 2023-09-12 NOTE — PROGRESS NOTES
"  [facility]  Occupational Therapy Treatment Note    Date: 9/13/2023  Name: Mitesh Aquino  Clinic Number: [id number]    Therapy Diagnosis:   No diagnosis found.      Physician: Abhay Camilo*    Physician Orders: Physician Orders: OT orthotic fabrication  Note:    Patient underwent L distal biceps reconstruction DOS 8/9/23. Need to start post op OT 2 weeks post op. Splint removed in regular ortho clinic then to OT for thermoplastic splint fabrication  Medical Diagnosis:  Z98.890 (ICD-10-CM) - Post-operative state S46.212A (ICD-10-CM) - Rupture of left distal biceps tendon, initial encounter      Date of Injury: 5/19/23  Date of Surgery: 8/9/2023   PROCEDURES PERFORMED:   Left distal biceps open achilles allograft reconstruction      Evaluation Date: 9/6/2023  Authorization Period: 8/21/23 - 8/6/24  Plan of Care Expiration: 11/29/23  Visit #/ Visits Authorized: 2/12  FOTO Completion: Initial eval (9/6/2023)  FOTO #2:  FOTO #3:  FOTO: date and score     Precautions:  Standard, tendon protocol - per PA, may perform ROM as tolerated at 4 weeks post op    9/11/2023: Pt is 4 weeks and 5 days post-op    Time In: 3:00 pm  Time Out: 3:44 am  Total Billable Time: 40 minutes      SUBJECTIVE   History of Current Condition: Mitesh Aquino is a 43 y.o. year old Right hand dominant male who sustained a Left distal biceps rupture at work on 5/19/23 when he slipped from a raftor and grabbed the beam above him and felt a pop. 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 He elected to undergo surgery at that time which consisted of allograft distal biceps reconstruction on 8/9/23    Involved Side: Left  Dominant Side: Right     Pt reports: increase in passive flex  He was compliant with home exercise program given last session.   Response to previous treatment:increase in " passive flex  Functional change: NA     Pain: 0/10  Location: left elbow    OBJECTIVE   Objective Measures updated at progress report unless specified.    Mental status: alert, oriented x3     Observation/Appearance:   11.5 cm incision in length along anterior elbow, fully closed, sutures removed, no signs of infection. Thick with limited mobility  Mild swelling about elbow     Sensation: Patient denies numbness/tingling        Edema:  (Measured circumferential, in centimeters)  Hand/Wrist: Right  9/6/2023 Left  9/6/2023   Wrist Crease 16.0 cm 16.3 cm      Elbow: Right  9/6/2023 Left  9/6/2023   3 Inches distal to elbow crease 25 cm 22.3 cm   Elbow crease 27.8 cm 29 cm   3 Inches proximal to elbow crease 27.5 cm 26.5 cm         ELBOW, WRIST RANGE OF MOTION: Measured in degrees of active motion with goniometer     All shoulder ROM is WNL, Full digit and thumb ROM bilaterally       Right  9/6/23 Left  9/6/23   Elbow Extension/Flexion -5/150 -50/140  (Neutral forearm, AAROM/PROM)   Pronation/Supination 80/80 50/65  (Active/passive)   Wrist Extension/Flexion 70/85 55/55   Ulnar/Radial Deviation 35/15 10/20   Composite Wrist Extension/Flexion NT NT         STRENGTH: Not tested due to precautions  (Measured in pounds using a Dynamometer and pinch meter)    Right   Left      Setting 2        Average       Key       3 Pt       Tip                   Treatment     Mitesh received the treatments listed below:     Supervised modalities after being cleared for contradictions: Hot Pack Left Elbow- x 10 min to increase soft tissue extensibility        Direct contact modalities after being cleared for contraindications: NT    Manual therapy techniques: 10 minutes  Manual Lymphatic Drainage, Soft tissue Mobilization, and Friction Massage were applied to the: sx site and elbow , PROM wrist flexion/ extension 10+ reps    Therapeutic exercises to develop ROM and flexibility for 30 minutes, including:  Gentle active elbow  flex/extension  with fa in 3 planes, 2/10 each way, in pain-free range  Gentle active supination/pro in pain-free range 2/10  A/AAROM wrist flex/extension 2/10  Scapular retraction x 10 reps  Shoulder circles x 10 reps ea way    Therapeutic activities to improve functional performance for NT   Neuromuscular re-education activities to improve NT   Patient Education and Home Exercises      Education provided:   - Reviewed HEP  - Progress towards goals     Written Home Exercises Provided: Patient instructed to cont prior HEP.  Exercises were reviewed and Mitesh was able to demonstrate them prior to the end of the session.  Mitesh demonstrated good  understanding of the HEP provided. See EMR under Patient Instructions for exercises provided during therapy sessions.      ASSESSMENT      Mitesh shows compliance with wear of orthosis and HEP. Subjective report of increase in elbow extension.      Mitesh is progressing well towards his goals and there are no updates to goals at this time. Pt prognosis is Excellent.     Pt will continue to benefit from skilled outpatient occupational therapy to address the deficits listed in the problem list on initial evaluation, provide pt/family education and to maximize pt's level of independence in the home and community environment.     Pt's spiritual, cultural and educational needs considered and pt agreeable to plan of care and goals.    Anticipated barriers to occupational therapy: none    Goals:  Long Term Goals (to be met by discharge):  Date Goal Met:       1.) Mitesh Aquino will demonstrate significantly improved functional performance from re-assessment as measured by a FOTO Intake score of more than 75.     Goal Status:   In progress     2.) Mitesh Aquino will return to near to prior level of function for ADLs and household management reporting independence or modified independence.     Goal Status:   In progress     3. Mitesh Aquino will report pain 0 out of 10 at worst to  increase functional use of LUE for work and leisure tasks.     Goal Status:   In progress      Short Term Goals (to be met by 10/19/23):  Date Goal Met:       Mitesh qAuino will be independent with home exercise program with written instructions.     Goal Status:   In progress     Mitesh Aquino will demonstrate -10/140 degrees of elbow ext/flex to improve functional performance in ADLs/work/leisure tasks.     Goal Status:   In progress     Mitesh Aquino will demonstrate within 20 lbs of  strength compared to other side to improve functional grasp for ADLs/work/leisure tasks.     Goal Status:   In progress     Mitesh Aquino will demonstrate the ability to complete ADL/IADL tasks with 2/10 pain.     Goal Status:   In progress         Plan      Pt to be treated by Occupational Therapy 3 times per week for 12 weeks during the certification period from 9/6/2023 to 11/29/23 to achieve the established goals.      Treatment to include: Paraffin, Fluidotherapy, Manual therapy/joint mobilizations, Modalities for pain management, US 3 mhz, Therapeutic exercises/activities., Iontophoresis with 2.0 cc Dexamethasone, Strengthening, Orthotic Fabrication/Fit/Training, Edema Control, Scar Management, Wound Care, Electrical Modalities, Joint Protection, and Energy Conservation, as well as any other treatments deemed necessary based on the patient's needs or progress.        PLAN     Continue skilled occupational therapy with individualized plan of care focusing on progression along protocol.     Updates/Grading for next session: reassess arom Progress as tolerated per protocol.     Michelle Alfred OT

## 2023-09-13 ENCOUNTER — CLINICAL SUPPORT (OUTPATIENT)
Dept: REHABILITATION | Facility: HOSPITAL | Age: 43
End: 2023-09-13
Payer: OTHER MISCELLANEOUS

## 2023-09-13 DIAGNOSIS — M25.622 DECREASED RANGE OF MOTION OF LEFT ELBOW: ICD-10-CM

## 2023-09-13 DIAGNOSIS — M25.522 LEFT ELBOW PAIN: Primary | ICD-10-CM

## 2023-09-13 PROCEDURE — 97110 THERAPEUTIC EXERCISES: CPT

## 2023-09-13 PROCEDURE — 97140 MANUAL THERAPY 1/> REGIONS: CPT

## 2023-09-13 PROCEDURE — 97010 HOT OR COLD PACKS THERAPY: CPT

## 2023-09-15 ENCOUNTER — CLINICAL SUPPORT (OUTPATIENT)
Dept: REHABILITATION | Facility: HOSPITAL | Age: 43
End: 2023-09-15
Payer: OTHER MISCELLANEOUS

## 2023-09-15 DIAGNOSIS — M25.522 LEFT ELBOW PAIN: Primary | ICD-10-CM

## 2023-09-15 DIAGNOSIS — M25.622 DECREASED RANGE OF MOTION OF LEFT ELBOW: ICD-10-CM

## 2023-09-15 PROCEDURE — 97110 THERAPEUTIC EXERCISES: CPT

## 2023-09-15 PROCEDURE — 97140 MANUAL THERAPY 1/> REGIONS: CPT

## 2023-09-15 PROCEDURE — 97010 HOT OR COLD PACKS THERAPY: CPT

## 2023-09-15 NOTE — PROGRESS NOTES
"  [facility]  Occupational Therapy Treatment Note    Date: 9/15/2023  Name: Mitesh Aquino  Clinic Number: [id number]    Therapy Diagnosis:   Encounter Diagnoses   Name Primary?    Left elbow pain Yes    Decreased range of motion of left elbow          Physician: Abhay Camilo*    Physician Orders: Physician Orders: OT orthotic fabrication  Note:    Patient underwent L distal biceps reconstruction DOS 8/9/23. Need to start post op OT 2 weeks post op. Splint removed in regular ortho clinic then to OT for thermoplastic splint fabrication  Medical Diagnosis:  Z98.890 (ICD-10-CM) - Post-operative state S46.212A (ICD-10-CM) - Rupture of left distal biceps tendon, initial encounter    5 wks, 2 day  Date of Injury: 5/19/23  Date of Surgery: 8/9/2023   PROCEDURES PERFORMED:   Left distal biceps open achilles allograft reconstruction      Evaluation Date: 9/6/2023  Authorization Period: 8/21/23 - 8/6/24  Plan of Care Expiration: 11/29/23  Visit #/ Visits Authorized: 2/12  FOTO Completion: Initial eval (9/6/2023)  FOTO #2:  FOTO #3:  FOTO: date and score     Precautions:  Standard, tendon protocol - per PA, may perform ROM as tolerated at 4 weeks post op    9/15/2023: Pt is 5 weeks and 2 days post-op    Time In: 9:30  am  Time Out: 10:18 am  Total Billable Time: 45 minutes      SUBJECTIVE   History of Current Condition: Mitesh Aquino is a 43 y.o. year old Right hand dominant male who sustained a Left distal biceps rupture at work on 5/19/23 when he slipped from a raftor and grabbed the beam above him and felt a pop. 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 He elected to undergo surgery at that time which consisted of allograft distal biceps reconstruction on 8/9/23    Involved Side: Left  Dominant Side: Right     Pt reports: increase in passive flex  He was " compliant with home exercise program given last session.   Response to previous treatment:increase in passive flex  Functional change: NA     Pain: 0/10  Location: left elbow    OBJECTIVE   Objective Measures updated at progress report unless specified.    Mental status: alert, oriented x3     Observation/Appearance:   11.5 cm incision in length along anterior elbow, fully closed, sutures removed, no signs of infection. Thick with limited mobility  Mild swelling about elbow     Sensation: Patient denies numbness/tingling        Edema:  (Measured circumferential, in centimeters)  Hand/Wrist: Right  9/6/2023 Left  9/6/2023   Wrist Crease 16.0 cm 16.3 cm      Elbow: Right  9/6/2023 Left  9/6/2023   3 Inches distal to elbow crease 25 cm 22.3 cm   Elbow crease 27.8 cm 29 cm   3 Inches proximal to elbow crease 27.5 cm 26.5 cm         ELBOW, WRIST RANGE OF MOTION: Measured in degrees of active motion with goniometer     All shoulder ROM is WNL, Full digit and thumb ROM bilaterally       Right  9/6/23 Left  9/6/23 Right   9/15/2023 AROM post heat and tx   Elbow Extension/Flexion -5/150 -50/140  (Neutral forearm, AAROM/PROM) 33/145   Pronation/Supination 80/80 50/65  (Active/passive) 69/80   Wrist Extension/Flexion 70/85 55/55 78/88   Ulnar/Radial Deviation 35/15 10/20    Composite Wrist Extension/Flexion NT NT          STRENGTH: Not tested due to precautions  (Measured in pounds using a Dynamometer and pinch meter)    Right   Left      Setting 2  Deferred   Deferred      Average       Key       3 Pt       Tip                   Treatment     Mitesh received the treatments listed below:     Supervised modalities after being cleared for contradictions: Hot Pack Left Elbow- x 10 min to increase soft tissue extensibility        Direct contact modalities after being cleared for contraindications: NT    Manual therapy techniques: 10 minutes  Manual Lymphatic Drainage, Soft tissue Mobilization, and Friction Massage were  applied to the: sx site and elbow , PROM wrist flexion/ extension 10+ reps    Therapeutic exercises to develop ROM and flexibility for 25 minutes, including:  Reassessment of rom  Gentle active elbow flex/extension  with fa in 3 planes, 2/10 each way, in pain-free range  Gentle active supination/pro in pain-free range 2/10  A/AAROM wrist flex/extension 2/10  Scapular retraction x 10 reps (NT)  Shoulder circles x 10 reps ea way  (NT)    Therapeutic activities to improve functional performance for NT   Neuromuscular re-education activities to improve NT   Patient Education and Home Exercises      Education provided:   - Reviewed HEP  - Progress towards goals     Written Home Exercises Provided: Patient instructed to cont prior HEP.  Exercises were reviewed and Mitesh was able to demonstrate them prior to the end of the session.  Mitesh demonstrated good  understanding of the HEP provided. See EMR under Patient Instructions for exercises provided during therapy sessions.      ASSESSMENT      Mitesh shows compliance with wear of orthosis and HEP. Upon formal reassessment, very good increases are noted in elbow extension , fa rotation, and wrist rom.       Mitesh is progressing well towards his goals and there are no updates to goals at this time. Pt prognosis is Excellent.     Pt will continue to benefit from skilled outpatient occupational therapy to address the deficits listed in the problem list on initial evaluation, provide pt/family education and to maximize pt's level of independence in the home and community environment.     Pt's spiritual, cultural and educational needs considered and pt agreeable to plan of care and goals.    Anticipated barriers to occupational therapy: none    Goals:  Long Term Goals (to be met by discharge):  Date Goal Met:       1.) Mitesh Aquino will demonstrate significantly improved functional performance from re-assessment as measured by a FOTO Intake score of more than 75.     Goal  Status:   In progress     2.) Mitesh Aquino will return to near to prior level of function for ADLs and household management reporting independence or modified independence.     Goal Status:   In progress     3. Mitesh Aquino will report pain 0 out of 10 at worst to increase functional use of LUE for work and leisure tasks.     Goal Status:   In progress      Short Term Goals (to be met by 10/19/23):  Date Goal Met:       Mitesh Aquino will be independent with home exercise program with written instructions.     Goal Status:   In progress     Mitesh Aquino will demonstrate -10/140 degrees of elbow ext/flex to improve functional performance in ADLs/work/leisure tasks.     Goal Status:   In progress     Mitesh Aquino will demonstrate within 20 lbs of  strength compared to other side to improve functional grasp for ADLs/work/leisure tasks.     Goal Status:   In progress     Mitesh Aquino will demonstrate the ability to complete ADL/IADL tasks with 2/10 pain.     Goal Status:   In progress         Plan      Pt to be treated by Occupational Therapy 3 times per week for 12 weeks during the certification period from 9/6/2023 to 11/29/23 to achieve the established goals.      Treatment to include: Paraffin, Fluidotherapy, Manual therapy/joint mobilizations, Modalities for pain management, US 3 mhz, Therapeutic exercises/activities., Iontophoresis with 2.0 cc Dexamethasone, Strengthening, Orthotic Fabrication/Fit/Training, Edema Control, Scar Management, Wound Care, Electrical Modalities, Joint Protection, and Energy Conservation, as well as any other treatments deemed necessary based on the patient's needs or progress.        PLAN     Continue skilled occupational therapy with individualized plan of care focusing on progression along protocol.     Updates/Grading for next session: reassess arom Progress as tolerated per protocol.     Michelle Alfred, OT

## 2023-09-19 ENCOUNTER — CLINICAL SUPPORT (OUTPATIENT)
Dept: REHABILITATION | Facility: HOSPITAL | Age: 43
End: 2023-09-19
Payer: OTHER MISCELLANEOUS

## 2023-09-19 DIAGNOSIS — M25.522 LEFT ELBOW PAIN: Primary | ICD-10-CM

## 2023-09-19 DIAGNOSIS — M25.622 DECREASED RANGE OF MOTION OF LEFT ELBOW: ICD-10-CM

## 2023-09-19 PROCEDURE — 97530 THERAPEUTIC ACTIVITIES: CPT

## 2023-09-19 PROCEDURE — 97110 THERAPEUTIC EXERCISES: CPT

## 2023-09-19 PROCEDURE — 97140 MANUAL THERAPY 1/> REGIONS: CPT

## 2023-09-19 NOTE — PROGRESS NOTES
CC: Left Distal Biceps Post-Op    DATE OF PROCEDURE: 8/9/2023   PROCEDURES PERFORMED:   Left distal biceps open achilles allograft reconstruction (CPT 77750-63)    Mitesh Aquino reports to be doing well 6wk s/p the above mentioned procedure. He is wearing his thermoplastic splint.  Denies any significant pain.  No numbness or tingling.  Describes some tightness over the biceps reconstruction region with elbow range of motion.  Working on regaining terminal extension.  Occupational Therapy at [facility].    O:  Exam of the left elbow and forearm demonstrates full elbow active flexion, 20° short of full passive extension, intact active pronation and supination with some deficits.  Well-healed incision from the reconstruction.  No signs of infection.  Intact distal biceps reconstruction to hook test.  Sensation intact to light touch over the entire left upper extremity.  No deficits over the lateral antebrachial cutaneous nerve distribution.  Motor intact to the left hand.    Radiographs of the left elbow were ordered today to include AP, lateral and radiocapitellar views, these were reviewed and interpreted to show evidence of distal biceps reconstruction.  No fracture or heterotopic ossification seen.      A/P:  Clinically doing well.  May discontinue the thermoplastic splint.  Instructed to wear the splint in environments where he could fall or sustained an inversion injury but otherwise may discontinue.  Begin regaining terminal elbow and forearm range of motion.  No lifting more than a cock can or small book.  No strengthening at this time.  That starts at around 4 weeks.  Return to clinic in 6 weeks.    POSTOPERATIVE PLAN:  We will see the patient back in 2 weeks for splint removal.  We will keep him in his splint just a bit longer in this case to protect our reconstruction.  After that time, we will fashion a removable posterior long-arm thermoplastic splint that he will wear for an additional 4 weeks.  We will  begin range of motion exercises after 4 weeks. I will advise lifting no more than 5 pounds with the affected extremity until 3 months postoperatively. Between 3 and 6 months, progressively more weight will be added until unlimited activity is permitted after 6 months.

## 2023-09-19 NOTE — PROGRESS NOTES
"  [facility]  Occupational Therapy Progress Note    Date: 9/19/2023  Name: Mitesh Aquino  Clinic Number: [id number]    Therapy Diagnosis:   Encounter Diagnoses   Name Primary?    Left elbow pain Yes    Decreased range of motion of left elbow          Physician: Abhay Camilo*    Physician Orders: Physician Orders: OT orthotic fabrication  Note:    Patient underwent L distal biceps reconstruction DOS 8/9/23. Need to start post op OT 2 weeks post op. Splint removed in regular ortho clinic then to OT for thermoplastic splint fabrication  Medical Diagnosis:  Z98.890 (ICD-10-CM) - Post-operative state S46.212A (ICD-10-CM) - Rupture of left distal biceps tendon, initial encounter    5 wks, 2 day  Date of Injury: 5/19/23  Date of Surgery: 8/9/2023   PROCEDURES PERFORMED:   Left distal biceps open achilles allograft reconstruction      Evaluation Date: 9/6/2023  Authorization Period: 8/21/23 - 8/6/24  Plan of Care Expiration: 11/29/23  Visit #/ Visits Authorized: 7 / 12  FOTO Completion: Initial eval (9/6/2023)  FOTO #2:  FOTO #3:  FOTO: date and score     Precautions:  Standard, tendon protocol - per PA, may perform ROM as tolerated at 4 weeks post op    9/19/2023: Pt is 5 weeks and 6 days post-op    Time In: 11:00 am  Time Out: 11:55 am  Total Billable Time: 55 minutes      SUBJECTIVE   History of Current Condition: Mitesh Aquino is a 43 y.o. year old Right hand dominant male who sustained a Left distal biceps rupture at work on 5/19/23 when he slipped from a raftor and grabbed the beam above him and felt a pop. 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 He elected to undergo surgery at that time which consisted of allograft distal biceps reconstruction on 8/9/23    Involved Side: Left  Dominant Side: Right     Pt reports: increase in passive flex  He was " compliant with home exercise program given last session.   Response to previous treatment:increase in passive flex  Functional change: NA     Pain: 0/10  Location: left elbow    OBJECTIVE   Objective Measures updated at progress report unless specified.    Mental status: alert, oriented x3     Observation/Appearance:   11.5 cm incision in length along anterior elbow, fully closed, no signs of infection. Thick with limited mobility  Mild swelling about elbow     Sensation: Patient denies numbness/tingling        Edema:  (Measured circumferential, in centimeters)  Hand/Wrist: Right  9/6/2023 Left  9/6/2023 Left  9/19/23   Wrist Crease 16.0 cm 16.3 cm 16.6      Elbow: Right  9/6/2023 Left  9/6/2023 Left  9/19/23   3 Inches distal to elbow crease 25 cm 22.3 cm 23.3   Elbow crease 27.8 cm 29 cm 27.5   3 Inches proximal to elbow crease 27.5 cm 26.5 cm 25.5         ELBOW, WRIST RANGE OF MOTION: Measured in degrees of active motion with goniometer     All shoulder ROM is WNL, Full digit and thumb ROM bilaterally       Right  9/6/23 Left  9/6/23 Left  9/15/2023 AROM post heat and tx Left  9/19/23  PRE TX   Elbow Extension/Flexion -5/150 -50/140  (Neutral forearm, AAROM/PROM) -33/145 -30/145   Pronation/Supination 80/80 50/65  (Active/passive) 69/80 60/80   Wrist Extension/Flexion 70/85 55/55 78/88 65/68   Ulnar/Radial Deviation 35/15 10/20  15/20   Composite Wrist Extension/Flexion NT NT           STRENGTH: Not tested due to precautions  (Measured in pounds using a Dynamometer and pinch meter)    Right   Left      Setting 2  Deferred   Deferred      Average       Key       3 Pt       Tip                   Treatment     Mitesh received the treatments listed below:     Supervised modalities after being cleared for contradictions: Hot Pack Left Elbow- x 10 min to increase soft tissue extensibility      Manual therapy techniques: 10 minutes  Manual Lymphatic Drainage, Soft tissue Mobilization, and Friction Massage were  applied to the:  STM to incision and surrounding area  PROM wrist flexion/extension 10+ reps    Therapeutic exercises to develop ROM and flexibility for 25 minutes, including:  Reassessment of objective measurements  Gentle active elbow flex/extension  with fa in 3 planes, 10 each way, in pain-free range  Gentle active supination/pro in pain-free range 10  A/AAROM wrist flex/extension (open and close fist) 10 ea  Scapular retraction x 10 reps   Shoulder circles x 10 reps ea way    Mitesh participated in dynamic functional therapeutic activities to improve functional performance for 10  minutes, including:  - Wrist wheel for wrist/elbow flex/ext and pro/sup (3 min ea)  - Holding ball for shoulder taps (AAROM elbow flex/ext) to ipsilateral, middle, and contralateral side (10 ea)    Issued compression sleeve for wrist/forearm to be worn at night under orthotic, and during day as needed for edema management      Patient Education and Home Exercises      Education provided:   - Reviewed HEP  - Progress towards goals     Written Home Exercises Provided: Patient instructed to cont prior HEP.  Exercises were reviewed and Mitesh was able to demonstrate them prior to the end of the session.  Mitesh demonstrated good  understanding of the HEP provided. See EMR under Patient Instructions for exercises provided during therapy sessions.      ASSESSMENT     Mitesh shows compliance with wear of orthosis and HEP. He has improving elbow, forearm and wrist ROM. Noted decrease in edema about the elbow, but slight increase about the wrist/forearm for which a compression sleeve was issued. He has follow up with MD this week. Will progress as appropriate.      Mitesh is progressing well towards his goals and there are no updates to goals at this time. Pt prognosis is Excellent.     Pt will continue to benefit from skilled outpatient occupational therapy to address the deficits listed in the problem list on initial evaluation, provide  pt/family education and to maximize pt's level of independence in the home and community environment.     Pt's spiritual, cultural and educational needs considered and pt agreeable to plan of care and goals.    Anticipated barriers to occupational therapy: none    Goals:  Long Term Goals (to be met by discharge):  Date Goal Met:       1.) Mitesh Aquino will demonstrate significantly improved functional performance from re-assessment as measured by a FOTO Intake score of more than 75.     Goal Status:   In progress     2.) Mitesh Aquino will return to near to prior level of function for ADLs and household management reporting independence or modified independence.     Goal Status:   In progress     3. Mitesh Aquino will report pain 0 out of 10 at worst to increase functional use of LUE for work and leisure tasks.     Goal Status:   In progress      Short Term Goals (to be met by 10/19/23):  Date Goal Met:       Mitesh Aquino will be independent with home exercise program with written instructions.    9/19/23 Goal Status:  Met     Mitesh Aquino will demonstrate -10/140 degrees of elbow ext/flex to improve functional performance in ADLs/work/leisure tasks.     Goal Status:   In progress - met for flexion     Mitesh Aquino will demonstrate within 20 lbs of  strength compared to other side to improve functional grasp for ADLs/work/leisure tasks.     Goal Status:   In progress     Mitesh Aquino will demonstrate the ability to complete ADL/IADL tasks with 2/10 pain.     Goal Status:   In progress         Plan      Pt to be treated by Occupational Therapy 3 times per week for 12 weeks during the certification period from 9/6/2023 to 11/29/23 to achieve the established goals.      Treatment to include: Paraffin, Fluidotherapy, Manual therapy/joint mobilizations, Modalities for pain management, US 3 mhz, Therapeutic exercises/activities., Iontophoresis with 2.0 cc Dexamethasone, Strengthening, Orthotic Fabrication/Fit/Training,  Edema Control, Scar Management, Wound Care, Electrical Modalities, Joint Protection, and Energy Conservation, as well as any other treatments deemed necessary based on the patient's needs or progress.        PLAN     Continue skilled occupational therapy with individualized plan of care focusing on progression along protocol.     Updates/Grading for next session: reassess arom Progress as tolerated per protocol.     Cintia Davison, OT

## 2023-09-21 ENCOUNTER — CLINICAL SUPPORT (OUTPATIENT)
Dept: REHABILITATION | Facility: HOSPITAL | Age: 43
End: 2023-09-21
Payer: OTHER MISCELLANEOUS

## 2023-09-21 ENCOUNTER — PATIENT MESSAGE (OUTPATIENT)
Dept: SPORTS MEDICINE | Facility: CLINIC | Age: 43
End: 2023-09-21
Payer: COMMERCIAL

## 2023-09-21 ENCOUNTER — HOSPITAL ENCOUNTER (OUTPATIENT)
Dept: RADIOLOGY | Facility: HOSPITAL | Age: 43
Discharge: HOME | End: 2023-09-21
Attending: STUDENT IN AN ORGANIZED HEALTH CARE EDUCATION/TRAINING PROGRAM
Payer: COMMERCIAL

## 2023-09-21 ENCOUNTER — OFFICE VISIT (OUTPATIENT)
Dept: SPORTS MEDICINE | Facility: CLINIC | Age: 43
End: 2023-09-21
Payer: OTHER MISCELLANEOUS

## 2023-09-21 VITALS
SYSTOLIC BLOOD PRESSURE: 129 MMHG | DIASTOLIC BLOOD PRESSURE: 90 MMHG | BODY MASS INDEX: 30.7 KG/M2 | HEART RATE: 67 BPM | WEIGHT: 207.25 LBS | HEIGHT: 69 IN

## 2023-09-21 DIAGNOSIS — Z98.890 POST-OPERATIVE STATE: ICD-10-CM

## 2023-09-21 DIAGNOSIS — M25.622 DECREASED RANGE OF MOTION OF LEFT ELBOW: ICD-10-CM

## 2023-09-21 DIAGNOSIS — Z98.890 POST-OPERATIVE STATE: Primary | ICD-10-CM

## 2023-09-21 DIAGNOSIS — M25.522 LEFT ELBOW PAIN: Primary | ICD-10-CM

## 2023-09-21 PROCEDURE — 97530 THERAPEUTIC ACTIVITIES: CPT

## 2023-09-21 PROCEDURE — 99024 PR POST-OP FOLLOW-UP VISIT: ICD-10-PCS | Mod: S$GLB,,, | Performed by: ORTHOPAEDIC SURGERY

## 2023-09-21 PROCEDURE — 73080 X-RAY EXAM OF ELBOW: CPT | Mod: 26,LT,, | Performed by: RADIOLOGY

## 2023-09-21 PROCEDURE — 99999 PR PBB SHADOW E&M-EST. PATIENT-LVL III: CPT | Mod: PBBFAC,,, | Performed by: ORTHOPAEDIC SURGERY

## 2023-09-21 PROCEDURE — 99024 POSTOP FOLLOW-UP VISIT: CPT | Mod: S$GLB,,, | Performed by: ORTHOPAEDIC SURGERY

## 2023-09-21 PROCEDURE — 99999 PR PBB SHADOW E&M-EST. PATIENT-LVL III: ICD-10-PCS | Mod: PBBFAC,,, | Performed by: ORTHOPAEDIC SURGERY

## 2023-09-21 PROCEDURE — 73080 XR ELBOW COMPLETE 3 VIEW LEFT: ICD-10-PCS | Mod: 26,LT,, | Performed by: RADIOLOGY

## 2023-09-21 PROCEDURE — 97110 THERAPEUTIC EXERCISES: CPT

## 2023-09-21 PROCEDURE — 97140 MANUAL THERAPY 1/> REGIONS: CPT

## 2023-09-21 PROCEDURE — 73080 X-RAY EXAM OF ELBOW: CPT | Mod: TC,LT

## 2023-09-21 NOTE — PROGRESS NOTES
"  [facility]  Occupational Therapy Daily Treatment Note    Date: 9/21/2023  Name: Mitesh Aquino  Clinic Number: [id number]    Therapy Diagnosis:   Encounter Diagnoses   Name Primary?    Left elbow pain Yes    Decreased range of motion of left elbow        Physician: Abhay Camilo    Physician Orders: Physician Orders: OT orthotic fabrication  Note:    Patient underwent L distal biceps reconstruction DOS 8/9/23. Need to start post op OT 2 weeks post op. Splint removed in regular ortho clinic then to OT for thermoplastic splint fabrication  Medical Diagnosis:  Z98.890 (ICD-10-CM) - Post-operative state S46.212A (ICD-10-CM) - Rupture of left distal biceps tendon, initial encounter    5 wks, 2 day  Date of Injury: 5/19/23  Date of Surgery: 8/9/2023   PROCEDURES PERFORMED:   Left distal biceps open achilles allograft reconstruction      Evaluation Date: 9/6/2023  Authorization Period: 8/21/23 - 8/6/24  Plan of Care Expiration: 11/29/23  Visit #/ Visits Authorized: 8 / 12  FOTO Completion: Initial eval (9/6/2023)  FOTO #2:  FOTO #3:  FOTO: date and score     Precautions:  Standard, tendon protocol - per PA, may perform ROM as tolerated at 4 weeks post op    9/21/2023: Pt is 6 weeks and 61 days post-op    Time In: 11:05 am  Time Out: 12:03 pm  Total Billable Time: 58 minutes      SUBJECTIVE   History of Current Condition: Mitesh Aquino is a 43 y.o. year old Right hand dominant male who sustained a Left distal biceps rupture at work on 5/19/23 when he slipped from a raftor and grabbed the beam above him and felt a pop. 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 He elected to undergo surgery at that time which consisted of allograft distal biceps reconstruction on 8/9/23    Involved Side: Left  Dominant Side: Right     Pt reports: He saw the MD this morning and " is able to d/c the orthotic except out in public, and begin to wean at night. No strengthening for another 4 weeks.  He was compliant with home exercise program given last session.   Response to previous treatment:increase in passive flex  Functional change: NA     Pain: 0/10  Location: left elbow    OBJECTIVE   Objective Measures updated at progress report unless specified.    Mental status: alert, oriented x3     Observation/Appearance:   11.5 cm incision in length along anterior elbow, fully closed, no signs of infection. Thick with limited mobility  Mild swelling about elbow     Sensation: Patient denies numbness/tingling        Edema:  (Measured circumferential, in centimeters)  Hand/Wrist: Right  9/6/2023 Left  9/6/2023 Left  9/19/23   Wrist Crease 16.0 cm 16.3 cm 16.6      Elbow: Right  9/6/2023 Left  9/6/2023 Left  9/19/23   3 Inches distal to elbow crease 25 cm 22.3 cm 23.3   Elbow crease 27.8 cm 29 cm 27.5   3 Inches proximal to elbow crease 27.5 cm 26.5 cm 25.5         ELBOW, WRIST RANGE OF MOTION: Measured in degrees of active motion with goniometer     All shoulder ROM is WNL, Full digit and thumb ROM bilaterally       Right  9/6/23 Left  9/6/23 Left  9/15/2023 AROM post heat and tx Left  9/19/23  PRE TX   Elbow Extension/Flexion -5/150 -50/140  (Neutral forearm, AAROM/PROM) -33/145 -30/145   Pronation/Supination 80/80 50/65  (Active/passive) 69/80 60/80   Wrist Extension/Flexion 70/85 55/55 78/88 65/68   Ulnar/Radial Deviation 35/15 10/20  15/20   Composite Wrist Extension/Flexion NT NT           STRENGTH: Not tested due to precautions  (Measured in pounds using a Dynamometer and pinch meter)    Right   Left      Setting 2  Deferred   Deferred      Average       Key       3 Pt       Tip                   Treatment     Mitesh received the treatments listed below:     Supervised modalities after being cleared for contradictions: Hot Pack Left Elbow- x 10 min to increase soft tissue  extensibility      Manual therapy techniques: 10 minutes  Manual Lymphatic Drainage, Soft tissue Mobilization, and Friction Massage were applied to the:  STM to incision and surrounding area  PROM wrist flexion/extension 10+ reps    Therapeutic exercises to develop ROM and flexibility for 28 minutes, including:  AROM elbow flex/extension  with fa in 3 planes, 10 each way, in pain-free range  AROM supination/pro 10 ea  AROM wrist flex/extension (open and close fist) 10 ea  Scapular retraction x 10 reps   Shoulder circles x 10 reps ea way  Wall slides for shoulder flex and abduction (15 ea)    Mitesh participated in dynamic functional therapeutic activities to improve functional performance for 10 minutes, including:  - Wrist wheel for wrist/elbow flex/ext and pro/sup (3 min ea)  - Wrist cage (3 min)  - Rotate ball around body (10 ea direction)    Demonstrated and instructed in gravity assisted elbow extension stretch (elbow extended, allowing weight of hand (no added weight) to provide gentle stretch 1 min, x 3 reps as tolerated)      Patient Education and Home Exercises      Education provided:   - Reviewed HEP  - Progress towards goals     Written Home Exercises Provided: Patient instructed to cont prior HEP.  Exercises were reviewed and Mitesh was able to demonstrate them prior to the end of the session.  Mitesh demonstrated good  understanding of the HEP provided. See EMR under Patient Instructions for exercises provided during therapy sessions.      ASSESSMENT     Mitesh tolerated treatment tasks well. He saw MD this morning and was cleared to d/c the orthotic except in public. He tolerated progression of shoulder girdle/elbow tasks. No elbow strengthening per MD for another 4 weeks. Will progress as appropriate.    Mitesh is progressing well towards his goals and there are no updates to goals at this time. Pt prognosis is Excellent.     Pt will continue to benefit from skilled outpatient occupational therapy to  address the deficits listed in the problem list on initial evaluation, provide pt/family education and to maximize pt's level of independence in the home and community environment.     Pt's spiritual, cultural and educational needs considered and pt agreeable to plan of care and goals.    Anticipated barriers to occupational therapy: none    Goals:  Long Term Goals (to be met by discharge):  Date Goal Met:       1.) Mitesh Aquino will demonstrate significantly improved functional performance from re-assessment as measured by a FOTO Intake score of more than 75.     Goal Status:   In progress     2.) Mitesh Aquino will return to near to prior level of function for ADLs and household management reporting independence or modified independence.     Goal Status:   In progress     3. Mitesh Aquino will report pain 0 out of 10 at worst to increase functional use of LUE for work and leisure tasks.     Goal Status:   In progress      Short Term Goals (to be met by 10/19/23):  Date Goal Met:       Mitesh Aquino will be independent with home exercise program with written instructions.    9/19/23 Goal Status:  Met     Mitesh Aquino will demonstrate -10/140 degrees of elbow ext/flex to improve functional performance in ADLs/work/leisure tasks.     Goal Status:   In progress - met for flexion     Mitesh Aquino will demonstrate within 20 lbs of  strength compared to other side to improve functional grasp for ADLs/work/leisure tasks.     Goal Status:   In progress     Mitesh Aquino will demonstrate the ability to complete ADL/IADL tasks with 2/10 pain.     Goal Status:   In progress         Plan      Pt to be treated by Occupational Therapy 3 times per week for 12 weeks during the certification period from 9/6/2023 to 11/29/23 to achieve the established goals.     Continue skilled occupational therapy with individualized plan of care focusing on progression along protocol.     Updates/Grading for next session: reassess arom Progress  as tolerated per protocol.     Cintia Davison, OT

## 2023-09-21 NOTE — PROGRESS NOTES
"  [facility]  Occupational Therapy Daily Treatment Note    Date: 9/22/2023  Name: Mitesh Aquino  Clinic Number: [id number]    Therapy Diagnosis:   Encounter Diagnoses   Name Primary?    Left elbow pain Yes    Decreased range of motion of left elbow          Physician: Abhay Camilo    Physician Orders: Physician Orders: OT orthotic fabrication  Note:    Patient underwent L distal biceps reconstruction DOS 8/9/23. Need to start post op OT 2 weeks post op. Splint removed in regular ortho clinic then to OT for thermoplastic splint fabrication  Medical Diagnosis:  Z98.890 (ICD-10-CM) - Post-operative state S46.212A (ICD-10-CM) - Rupture of left distal biceps tendon, initial encounter    5 wks, 2 day  Date of Injury: 5/19/23  Date of Surgery: 8/9/2023   PROCEDURES PERFORMED:   Left distal biceps open achilles allograft reconstruction      Evaluation Date: 9/6/2023  Authorization Period: 8/21/23 - 8/6/24  Plan of Care Expiration: 11/29/23  Visit #/ Visits Authorized: 9 / 12  FOTO Completion: Initial eval (9/6/2023)  FOTO #2:  FOTO #3:  FOTO: date and score     Precautions:  Standard, tendon protocol - Updated on 5/20/2023 : cleared to d/c the orthotic except in public. No elbow strengthening per MD for another 4 weeks.     9/21/2023: Pt is 6 weeks and 61 days post-op    Time In: 9:30 am  Time Out: 10:30  pm  Total Billable Time: 60 minutes      SUBJECTIVE   History of Current Condition: Mitesh Aquino is a 43 y.o. year old Right hand dominant male who sustained a Left distal biceps rupture at work on 5/19/23 when he slipped from a raftor and grabbed the beam above him and felt a pop. 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 He elected to undergo surgery at that time which consisted of allograft distal biceps reconstruction on 8/9/23    Involved Side: " "Left  Dominant Side: Right     Pt reports no pain but feeling of "tightness."  He was compliant with home exercise program given last session.   Response to previous treatment:increase in passive flex  Functional change: NA     Pain: 0/10  Location: left elbow    OBJECTIVE   Objective Measures updated at progress report unless specified.    Mental status: alert, oriented x3     Observation/Appearance:   11.5 cm incision in length along anterior elbow, fully closed, no signs of infection. Thick with limited mobility  Mild swelling about elbow     Sensation: Patient denies numbness/tingling        Edema:  (Measured circumferential, in centimeters)  Hand/Wrist: Right  9/6/2023 Left  9/6/2023 Left  9/19/23   Wrist Crease 16.0 cm 16.3 cm 16.6      Elbow: Right  9/6/2023 Left  9/6/2023 Left  9/19/23   3 Inches distal to elbow crease 25 cm 22.3 cm 23.3   Elbow crease 27.8 cm 29 cm 27.5   3 Inches proximal to elbow crease 27.5 cm 26.5 cm 25.5         ELBOW, WRIST RANGE OF MOTION: Measured in degrees of active motion with goniometer     All shoulder ROM is WNL, Full digit and thumb ROM bilaterally       Right  9/6/23 Left  9/6/23 Left  9/15/2023 AROM post heat and tx Left  9/19/23  PRE TX   Elbow Extension/Flexion -5/150 -50/140  (Neutral forearm, AAROM/PROM) -33/145 -30/145   Pronation/Supination 80/80 50/65  (Active/passive) 69/80 60/80   Wrist Extension/Flexion 70/85 55/55 78/88 65/68   Ulnar/Radial Deviation 35/15 10/20  15/20   Composite Wrist Extension/Flexion NT NT           STRENGTH: Not tested due to precautions  (Measured in pounds using a Dynamometer and pinch meter)    Right   Left      Setting 2  Deferred   Deferred      Average       Key       3 Pt       Tip                   Treatment     Mitehs received the treatments listed below:     Supervised modalities after being cleared for contradictions: Hot Pack Left Elbow- x 10 min to increase soft tissue extensibility      Manual therapy techniques: 5 " minutes  Manual Lymphatic Drainage, Soft tissue Mobilization, and Friction Massage were applied to the:  STM to incision and surrounding area  PROM wrist flexion/extension 10+ reps    Therapeutic exercises to develop ROM and flexibility for 30 minutes, including:  AROM elbow flex/extension  with fa in 3 planes, 2/10 each way, in pain-free range  AROM supination/pro 10 ea  AROM wrist flex/extension (open and close fist) 10 ea (NT)  Scapular retraction x 10 reps   Shoulder circles x 10 reps ea way  Wall slides for shoulder flex  (30)    Mitesh participated in dynamic functional therapeutic activities to improve functional performance for 15 minutes, including:  - Wrist wheel for wrist/elbow flex/ext and pro/sup (3 min ea)  - Wrist cage (3 min)  - Rotate ball around body (10 ea direction)    Demonstrated and instructed in gravity assisted elbow extension stretch (elbow extended, allowing weight of hand (no added weight) to provide gentle stretch 1 min, x 3 reps as tolerated) (NT)      Patient Education and Home Exercises      Education provided:   - Reviewed HEP  - Progress towards goals     Written Home Exercises Provided: Patient instructed to cont prior HEP.  Exercises were reviewed and Mitesh was able to demonstrate them prior to the end of the session.  Mitesh demonstrated good  understanding of the HEP provided. See EMR under Patient Instructions for exercises provided during therapy sessions.      ASSESSMENT     Mitesh is progressing very well and denies pain. This week he was seen by MD who cleared to d/c the orthotic except in public. No elbow strengthening per MD for another 4 weeks. Will progress as appropriate.    Mitesh is progressing well towards his goals and there are no updates to goals at this time. Pt prognosis is Excellent.     Pt will continue to benefit from skilled outpatient occupational therapy to address the deficits listed in the problem list on initial evaluation, provide pt/family education  and to maximize pt's level of independence in the home and community environment.     Pt's spiritual, cultural and educational needs considered and pt agreeable to plan of care and goals.    Anticipated barriers to occupational therapy: none    Goals:  Long Term Goals (to be met by discharge):  Date Goal Met:       1.) Mitesh Aquino will demonstrate significantly improved functional performance from re-assessment as measured by a FOTO Intake score of more than 75.     Goal Status:   In progress     2.) Mitesh Aquino will return to near to prior level of function for ADLs and household management reporting independence or modified independence.     Goal Status:   In progress     3. Mitesh Aquino will report pain 0 out of 10 at worst to increase functional use of LUE for work and leisure tasks.     Goal Status:   In progress      Short Term Goals (to be met by 10/19/23):  Date Goal Met:       Mitesh Aquino will be independent with home exercise program with written instructions.    9/19/23 Goal Status:  Met     Mitesh Aquino will demonstrate -10/140 degrees of elbow ext/flex to improve functional performance in ADLs/work/leisure tasks.     Goal Status:   In progress - met for flexion     Mitesh Aquino will demonstrate within 20 lbs of  strength compared to other side to improve functional grasp for ADLs/work/leisure tasks.     Goal Status:   In progress     Mitesh Aquino will demonstrate the ability to complete ADL/IADL tasks with 2/10 pain.     Goal Status:   In progress         Plan      Pt to be treated by Occupational Therapy 3 times per week for 12 weeks during the certification period from 9/6/2023 to 11/29/23 to achieve the established goals.     Continue skilled occupational therapy with individualized plan of care focusing on progression along protocol.     Updates/Grading for next session: reassess arom Progress as tolerated per protocol.     Michelle Alfred, OT

## 2023-09-22 ENCOUNTER — CLINICAL SUPPORT (OUTPATIENT)
Dept: REHABILITATION | Facility: HOSPITAL | Age: 43
End: 2023-09-22
Payer: OTHER MISCELLANEOUS

## 2023-09-22 DIAGNOSIS — M25.622 DECREASED RANGE OF MOTION OF LEFT ELBOW: ICD-10-CM

## 2023-09-22 DIAGNOSIS — M25.522 LEFT ELBOW PAIN: Primary | ICD-10-CM

## 2023-09-26 ENCOUNTER — PATIENT MESSAGE (OUTPATIENT)
Dept: SPORTS MEDICINE | Facility: CLINIC | Age: 43
End: 2023-09-26
Payer: COMMERCIAL

## 2023-09-26 ENCOUNTER — CLINICAL SUPPORT (OUTPATIENT)
Dept: REHABILITATION | Facility: HOSPITAL | Age: 43
End: 2023-09-26
Payer: OTHER MISCELLANEOUS

## 2023-09-26 DIAGNOSIS — M25.622 DECREASED RANGE OF MOTION OF LEFT ELBOW: ICD-10-CM

## 2023-09-26 DIAGNOSIS — Z98.890 POST-OPERATIVE STATE: Primary | ICD-10-CM

## 2023-09-26 DIAGNOSIS — M25.522 LEFT ELBOW PAIN: Primary | ICD-10-CM

## 2023-09-26 PROCEDURE — 97530 THERAPEUTIC ACTIVITIES: CPT

## 2023-09-26 PROCEDURE — 97110 THERAPEUTIC EXERCISES: CPT

## 2023-09-26 PROCEDURE — 97140 MANUAL THERAPY 1/> REGIONS: CPT

## 2023-09-26 NOTE — PROGRESS NOTES
"  [facility]  Occupational Therapy Daily Treatment Note    Date: 9/26/2023  Name: Mitesh Aquino  Clinic Number: [id number]    Therapy Diagnosis:   Encounter Diagnoses   Name Primary?    Left elbow pain Yes    Decreased range of motion of left elbow          Physician: Abhay Camilo*    Physician Orders: Physician Orders: OT orthotic fabrication  Note:    Patient underwent L distal biceps reconstruction DOS 8/9/23. Need to start post op OT 2 weeks post op. Splint removed in regular ortho clinic then to OT for thermoplastic splint fabrication  Medical Diagnosis:  Z98.890 (ICD-10-CM) - Post-operative state S46.212A (ICD-10-CM) - Rupture of left distal biceps tendon, initial encounter    5 wks, 2 day  Date of Injury: 5/19/23  Date of Surgery: 8/9/2023   PROCEDURES PERFORMED:   Left distal biceps open achilles allograft reconstruction      Evaluation Date: 9/6/2023  Authorization Period: 8/21/23 - 8/6/24  Plan of Care Expiration: 11/29/23  Visit #/ Visits Authorized: 10 / 12  FOTO Completion: Initial eval (9/6/2023)  FOTO #2:  FOTO #3:  FOTO: date and score     Precautions:  Standard, tendon protocol - Updated on 9/21/2023 : cleared to d/c the orthotic except in public. No elbow strengthening per MD for another 4 weeks (10 weeks post op)      Time In: 11:10 am  Time Out: 12:00 pm  Total Billable Time: 42 minutes (8 min hot pack)      SUBJECTIVE   History of Current Condition: Mitesh Aquino is a 43 y.o. year old Right hand dominant male who sustained a Left distal biceps rupture at work on 5/19/23 when he slipped from a raftor and grabbed the beam above him and felt a pop. 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 He elected to undergo surgery at that time which consisted of allograft distal biceps reconstruction on 8/9/23    Involved Side: Left  Dominant " "Side: Right     Pt reports: "It feels straighter. I think it helps that I'm able to be out of the splint. It hurts in the wrist a bit when I turn the wheel (going palm down into pronation"  He was compliant with home exercise program given last session.   Response to previous treatment:increase in passive flex  Functional change: able to drive    Pain: 0/10  Location: left elbow    OBJECTIVE   Objective Measures updated at progress report unless specified.    Mental status: alert, oriented x3     Observation/Appearance:   11.5 cm incision in length along anterior elbow, fully closed, no signs of infection. Thick with limited mobility  Mild swelling about elbow     Sensation: Patient denies numbness/tingling        Edema:  (Measured circumferential, in centimeters)  Hand/Wrist: Right  9/6/2023 Left  9/6/2023 Left  9/19/23 Left  9/26/23   Wrist Crease 16.0 cm 16.3 cm 16.6 16.6      Elbow: Right  9/6/2023 Left  9/6/2023 Left  9/19/23   3 Inches distal to elbow crease 25 cm 22.3 cm 23.3   Elbow crease 27.8 cm 29 cm 27.5   3 Inches proximal to elbow crease 27.5 cm 26.5 cm 25.5         ELBOW, WRIST RANGE OF MOTION: Measured in degrees of active motion with goniometer     All shoulder ROM is WNL, Full digit and thumb ROM bilaterally       Right  9/6/23 Left  9/6/23 Left  9/15/2023 AROM post heat and tx Left  9/19/23  PRE TX Left  9/26/23   Elbow Extension/Flexion -5/150 -50/140  (Neutral forearm, AAROM/PROM) -33/145 -30/145 -10/145   Pronation/Supination 80/80 50/65  (Active/passive) 69/80 60/80 70/WNL   Wrist Extension/Flexion 70/85 55/55 78/88 65/68 65/70   Ulnar/Radial Deviation 35/15 10/20  15/20 15/20   Composite Wrist Extension/Flexion NT NT            STRENGTH: Not tested due to precautions  (Measured in pounds using a Dynamometer and pinch meter)    Right   Left      Setting 2  Deferred   Deferred      Average       Key       3 Pt       Tip                   Treatment     Mitesh received the treatments " listed below:     Supervised modalities after being cleared for contradictions: Hot Pack Left Elbow- x 8 min to increase soft tissue extensibility      Mitesh received the following manual therapy techniques for 10 minutes:   Manual Lymphatic Drainage, Soft tissue Mobilization, and Friction Massage were applied to the:  STM to incision and surrounding area  PROM wrist flexion/extension 10+ reps  PROM pronation    Therapeutic exercises to develop ROM and flexibility for 24 minutes, including:  - Reassessment of objective measurements  AROM elbow flex/extension with fa in 3 planes, 10 each way  AROM supination/pro 10 ea  AROM wrist flex/extension (open and close fist) 10 ea  Scapular retraction x 10 reps   Shoulder circles x 10 reps ea way  Wall slides for shoulder flex and abduction (10 ea)  Bent over Ws and Rows (2 x 10 ea)    Mitesh participated in dynamic functional therapeutic activities to improve functional performance for 8 minutes, including:  - Wrist wheel for wrist/elbow flex/ext and pro/sup (3 min ea)  - Wrist cage (3 min)  - Rotate ball around body (10+ ea direction)      Patient Education and Home Exercises      Education provided:   - Reviewed HEP  - Progress towards goals     Written Home Exercises Provided: Patient instructed to cont prior HEP.  Exercises were reviewed and Mitesh was able to demonstrate them prior to the end of the session.  Mitesh demonstrated good  understanding of the HEP provided. See EMR under Patient Instructions for exercises provided during therapy sessions.      ASSESSMENT     Mitesh is progressing very well and denies pain. Upon reassessment, he has improving elbow and forearm ROM. Remains slightly limited with forearm pronation. Tolerated progression of shoulder/elbow tasks well. No elbow strengthening per MD until 10 weeks post op. Will progress as appropriate.    Mitesh is progressing well towards his goals and there are no updates to goals at this time. Pt prognosis is  Excellent.     Pt will continue to benefit from skilled outpatient occupational therapy to address the deficits listed in the problem list on initial evaluation, provide pt/family education and to maximize pt's level of independence in the home and community environment.     Pt's spiritual, cultural and educational needs considered and pt agreeable to plan of care and goals.    Anticipated barriers to occupational therapy: none    Goals:  Long Term Goals (to be met by discharge):  Date Goal Met:       1.) Mitesh Aquino will demonstrate significantly improved functional performance from re-assessment as measured by a FOTO Intake score of more than 75.     Goal Status:   In progress     2.) Mitesh Aquino will return to near to prior level of function for ADLs and household management reporting independence or modified independence.     Goal Status:   In progress     3. Mitesh Aquino will report pain 0 out of 10 at worst to increase functional use of LUE for work and leisure tasks.     Goal Status:   In progress      Short Term Goals (to be met by 10/19/23):  Date Goal Met:       Mitesh Aquino will be independent with home exercise program with written instructions.    9/19/23 Goal Status:  Met     Mitesh Aquino will demonstrate -10/140 degrees of elbow ext/flex to improve functional performance in ADLs/work/leisure tasks.    9/26/23 Goal Status:  Met     Mitesh Aquino will demonstrate within 20 lbs of  strength compared to other side to improve functional grasp for ADLs/work/leisure tasks.     Goal Status:   In progress     Mitesh Aquino will demonstrate the ability to complete ADL/IADL tasks with 2/10 pain.    9/26/23 Goal Status:  Met         Plan      Pt to be treated by Occupational Therapy 3 times per week for 12 weeks during the certification period from 9/6/2023 to 11/29/23 to achieve the established goals.     Continue skilled occupational therapy with individualized plan of care focusing on progression along  protocol.     Updates/Grading for next session: Progress as tolerated per protocol.     Cintia Davison, OT

## 2023-09-27 DIAGNOSIS — S46.212A RUPTURE OF LEFT DISTAL BICEPS TENDON, INITIAL ENCOUNTER: Primary | ICD-10-CM

## 2023-09-28 ENCOUNTER — CLINICAL SUPPORT (OUTPATIENT)
Dept: REHABILITATION | Facility: HOSPITAL | Age: 43
End: 2023-09-28
Payer: OTHER MISCELLANEOUS

## 2023-09-28 DIAGNOSIS — M25.622 DECREASED RANGE OF MOTION OF LEFT ELBOW: ICD-10-CM

## 2023-09-28 DIAGNOSIS — M25.522 LEFT ELBOW PAIN: Primary | ICD-10-CM

## 2023-09-28 PROCEDURE — 97140 MANUAL THERAPY 1/> REGIONS: CPT

## 2023-09-28 PROCEDURE — 97110 THERAPEUTIC EXERCISES: CPT

## 2023-09-28 NOTE — PATIENT INSTRUCTIONS
"[facility] OCCUPATIONAL THERAPY  HOME EXERCISE PROGRAM     Complete the following strengthening program 1x/day.        /Squeezes  - Squeeze putty, gripping for 20 reps              Three Point Pinches  - Roll putty into a log  - Pinch along with the thumb, index and middle fingers.  - Make sure to keep an "O"  - Pinch 3 logs total    Cintia Davison OTR/L       "

## 2023-09-28 NOTE — PROGRESS NOTES
"  [facility]  Occupational Therapy Daily Treatment Note    Date: 9/28/2023  Name: Mitesh Aquino  Clinic Number: [id number]    Therapy Diagnosis:   Encounter Diagnoses   Name Primary?    Left elbow pain Yes    Decreased range of motion of left elbow          Physician: Abhay Camilo*    Physician Orders: Physician Orders: OT orthotic fabrication  Note:    Patient underwent L distal biceps reconstruction DOS 8/9/23. Need to start post op OT 2 weeks post op. Splint removed in regular ortho clinic then to OT for thermoplastic splint fabrication  Medical Diagnosis:  Z98.890 (ICD-10-CM) - Post-operative state S46.212A (ICD-10-CM) - Rupture of left distal biceps tendon, initial encounter    5 wks, 2 day  Date of Injury: 5/19/23  Date of Surgery: 8/9/2023   PROCEDURES PERFORMED:   Left distal biceps open achilles allograft reconstruction      Evaluation Date: 9/6/2023  Authorization Period: 8/21/23 - 8/6/24  Plan of Care Expiration: 11/29/23  Visit #/ Visits Authorized: 11 / 12  FOTO Completion: Initial eval (9/6/2023)  FOTO #2:  FOTO #3:  FOTO: date and score     Precautions:  Standard, tendon protocol - Updated on 9/21/2023 : cleared to d/c the orthotic except in public. No elbow strengthening per MD for another 4 weeks (10 weeks post op)      Time In: 11:00 am  Time Out: 11:55 am  Total Billable Time: 45 minutes (10 min hot pack)      SUBJECTIVE   History of Current Condition: Mitesh Aquino is a 43 y.o. year old Right hand dominant male who sustained a Left distal biceps rupture at work on 5/19/23 when he slipped from a raftor and grabbed the beam above him and felt a pop. 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 He elected to undergo surgery at that time which consisted of allograft distal biceps reconstruction on 8/9/23    Involved Side: Left  Dominant " "Side: Right     Pt reports: "The wrist feels sore across the top. The elbow is fine"  He was compliant with home exercise program given last session.   Response to previous treatment:increase in passive flex  Functional change: able to drive    Pain: 0/10  Location: left elbow    OBJECTIVE   Objective Measures updated at progress report unless specified.    Mental status: alert, oriented x3     Observation/Appearance:   11.5 cm incision in length along anterior elbow, fully closed, no signs of infection. Thick with limited mobility  Mild swelling about elbow     Sensation: Patient denies numbness/tingling        Edema:  (Measured circumferential, in centimeters)  Hand/Wrist: Right  9/6/2023 Left  9/6/2023 Left  9/19/23 Left  9/26/23   Wrist Crease 16.0 cm 16.3 cm 16.6 16.6      Elbow: Right  9/6/2023 Left  9/6/2023 Left  9/19/23   3 Inches distal to elbow crease 25 cm 22.3 cm 23.3   Elbow crease 27.8 cm 29 cm 27.5   3 Inches proximal to elbow crease 27.5 cm 26.5 cm 25.5         ELBOW, WRIST RANGE OF MOTION: Measured in degrees of active motion with goniometer     All shoulder ROM is WNL, Full digit and thumb ROM bilaterally       Right  9/6/23 Left  9/6/23 Left  9/15/2023 AROM post heat and tx Left  9/19/23  PRE TX Left  9/26/23   Elbow Extension/Flexion -5/150 -50/140  (Neutral forearm, AAROM/PROM) -33/145 -30/145 -10/145   Pronation/Supination 80/80 50/65  (Active/passive) 69/80 60/80 70/WNL   Wrist Extension/Flexion 70/85 55/55 78/88 65/68 65/70   Ulnar/Radial Deviation 35/15 10/20  15/20 15/20   Composite Wrist Extension/Flexion NT NT            STRENGTH: Not tested due to precautions  (Measured in pounds using a Dynamometer and pinch meter)    Right   Left      Setting 2  Deferred   Deferred      Average       Key       3 Pt       Tip            Manual Muscle Test:  Thumb MP ext: mild pain: 4/5  Wrist ext: mild pain: 4/5        Treatment     Mitesh received the treatments listed below:     Supervised " modalities after being cleared for contradictions: Hot Pack Left Elbow- x 10 min to increase soft tissue extensibility      Mitesh received the following manual therapy techniques for 8 minutes:   Manual Lymphatic Drainage, Soft tissue Mobilization, and Friction Massage were applied to the:  STM to incision and surrounding area - mobilization with gentle elbow extension  PROM wrist flexion/extension 10+ reps  PROM pronation    Therapeutic exercises to develop ROM and flexibility for 47 minutes, including:  AROM elbow flex/extension with fa in 3 planes, 10 each way  AROM supination/pro 10 ea  AROM wrist flex/extension (open and close fist) 10 ea  Scapular retraction x 10 reps   Shoulder circles x 10 reps ea way  Wall slides for shoulder flex and abduction (10 ea)  Bent over Ws and Rows (2 x 10 ea)  Yellow theraputty  (20 reps), 3 pt pinches (3 logs), dowel presses (2 min)   Wrist curls, 1#, 3 ways - elbow at 90* and supported - 10 reps ea    Applied KT I band along APL/EPB pathway for inhibition and I band along dorsal wrist for stabilization  Updated HEP with theraputty  and pinches - provided with written instructions    Mitesh participated in dynamic functional therapeutic activities to improve functional performance for 0 minutes, including:  - Wrist wheel for wrist/elbow flex/ext and pro/sup (3 min ea)  - Wrist cage (3 min)  - Rotate ball around body (10+ ea direction)          Patient Education and Home Exercises      Education provided:   - Reviewed HEP  - Progress towards goals     Written Home Exercises Provided: yes. Theraputty tasks  Exercises were reviewed and Mitesh was able to demonstrate them prior to the end of the session.  Mitesh demonstrated good  understanding of the HEP provided. See EMR under Patient Instructions for exercises provided during therapy sessions.      ASSESSMENT     Mitesh is progressing very well. He reports onset of dorsoradial wrist pain, and is noted to have  reproduction of mild symptoms with MMT of thumb ext and wrist ext. Applied KT and will assess tolerance next session. Progressed with gentle  and wrist strengthening with elbow in 90* and supported. No elbow strengthening per MD until 10 weeks post op. Will progress as appropriate.    Mitesh is progressing well towards his goals and there are no updates to goals at this time. Pt prognosis is Excellent.     Pt will continue to benefit from skilled outpatient occupational therapy to address the deficits listed in the problem list on initial evaluation, provide pt/family education and to maximize pt's level of independence in the home and community environment.     Pt's spiritual, cultural and educational needs considered and pt agreeable to plan of care and goals.    Anticipated barriers to occupational therapy: none    Goals:  Long Term Goals (to be met by discharge):  Date Goal Met:       1.) Mitesh Aquino will demonstrate significantly improved functional performance from re-assessment as measured by a FOTO Intake score of more than 75.     Goal Status:   In progress     2.) Mitesh Aquino will return to near to prior level of function for ADLs and household management reporting independence or modified independence.     Goal Status:   In progress     3. Mitesh Aquino will report pain 0 out of 10 at worst to increase functional use of LUE for work and leisure tasks.     Goal Status:   In progress      Short Term Goals (to be met by 10/19/23):  Date Goal Met:       Mitesh Aquino will be independent with home exercise program with written instructions.    9/19/23 Goal Status:  Met     Mitesh Aquino will demonstrate -10/140 degrees of elbow ext/flex to improve functional performance in ADLs/work/leisure tasks.    9/26/23 Goal Status:  Met     Mitesh Aquino will demonstrate within 20 lbs of  strength compared to other side to improve functional grasp for ADLs/work/leisure tasks.     Goal Status:   In progress      Mitesh Aquino will demonstrate the ability to complete ADL/IADL tasks with 2/10 pain.    9/26/23 Goal Status:  Met         Plan      Pt to be treated by Occupational Therapy 3 times per week for 12 weeks during the certification period from 9/6/2023 to 11/29/23 to achieve the established goals.     Continue skilled occupational therapy with individualized plan of care focusing on progression along protocol.     Updates/Grading for next session: Progress as tolerated per protocol.     Cintia Davison, OT

## 2023-10-03 ENCOUNTER — CLINICAL SUPPORT (OUTPATIENT)
Dept: REHABILITATION | Facility: HOSPITAL | Age: 43
End: 2023-10-03
Payer: OTHER MISCELLANEOUS

## 2023-10-03 DIAGNOSIS — M25.522 LEFT ELBOW PAIN: Primary | ICD-10-CM

## 2023-10-03 DIAGNOSIS — M25.622 DECREASED RANGE OF MOTION OF LEFT ELBOW: ICD-10-CM

## 2023-10-03 PROCEDURE — 97140 MANUAL THERAPY 1/> REGIONS: CPT

## 2023-10-03 PROCEDURE — 97110 THERAPEUTIC EXERCISES: CPT

## 2023-10-03 NOTE — PROGRESS NOTES
"  [facility]  Occupational Therapy Daily Treatment Note    Date: 10/3/2023  Name: Mitesh Aquino  Clinic Number: [id number]    Therapy Diagnosis:   Encounter Diagnoses   Name Primary?    Left elbow pain Yes    Decreased range of motion of left elbow        Physician: Abhay Camilo    Physician Orders: Physician Orders: OT orthotic fabrication  Note:    Patient underwent L distal biceps reconstruction DOS 8/9/23. Need to start post op OT 2 weeks post op. Splint removed in regular ortho clinic then to OT for thermoplastic splint fabrication  Medical Diagnosis:  Z98.890 (ICD-10-CM) - Post-operative state S46.212A (ICD-10-CM) - Rupture of left distal biceps tendon, initial encounter     Date of Injury: 5/19/23  Date of Surgery: 8/9/2023   PROCEDURES PERFORMED:   Left distal biceps open achilles allograft reconstruction      Evaluation Date: 9/6/2023  Authorization Period: 8/21/23 - 8/6/24  Plan of Care Expiration: 11/29/23  Visit #/ Visits Authorized: 12 / 12  FOTO Completion: Initial eval (9/6/2023), 66%  FOTO #2: 9/22/23: 68%  FOTO #3:    Last Name: DENYS  Access Code: [email or web address]      Precautions:  Standard, tendon protocol - Updated on 9/21/2023 : cleared to d/c the orthotic except in public. No elbow strengthening per MD for another 4 weeks (10 weeks post op)      Time In: 11:00 am  Time Out: 11:58 am  Total Billable Time: 48 minutes (10 min hot pack)      SUBJECTIVE   History of Current Condition: Mitesh Aquino is a 43 y.o. year old Right hand dominant male who sustained a Left distal biceps rupture at work on 5/19/23 when he slipped from a raftor and grabbed the beam above him and felt a pop. 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 He elected to undergo surgery at that time which consisted of allograft distal biceps reconstruction on 8/9/23    Involved " "Side: Left  Dominant Side: Right     Pt reports: "My elbow is a bit sore. I was cooking yesterday, more so stirring and boiling shrimp. I had my sons do the heavy lifting of the pots"  He was compliant with home exercise program given last session.   Response to previous treatment:increase in passive flex  Functional change: able to drive    Pain: 1/10  Location: left elbow    OBJECTIVE   Objective Measures updated at progress report unless specified.    Mental status: alert, oriented x3     Observation/Appearance:   11.5 cm incision in length along anterior elbow, fully closed, no signs of infection. Thick with limited mobility  Mild swelling about elbow     Sensation: Patient denies numbness/tingling        Edema:  (Measured circumferential, in centimeters)  Hand/Wrist: Right  9/6/2023 Left  9/6/2023 Left  9/19/23 Left  9/26/23   Wrist Crease 16.0 cm 16.3 cm 16.6 16.6      Elbow: Right  9/6/2023 Left  9/6/2023 Left  9/19/23   3 Inches distal to elbow crease 25 cm 22.3 cm 23.3   Elbow crease 27.8 cm 29 cm 27.5   3 Inches proximal to elbow crease 27.5 cm 26.5 cm 25.5         ELBOW, WRIST RANGE OF MOTION: Measured in degrees of active motion with goniometer     All shoulder ROM is WNL, Full digit and thumb ROM bilaterally       Right  9/6/23 Left  9/6/23 Left  9/15/2023 AROM post heat and tx Left  9/19/23  PRE TX Left  9/26/23   Elbow Extension/Flexion -5/150 -50/140  (Neutral forearm, AAROM/PROM) -33/145 -30/145 -10/145   Pronation/Supination 80/80 50/65  (Active/passive) 69/80 60/80 70/WNL   Wrist Extension/Flexion 70/85 55/55 78/88 65/68 65/70   Ulnar/Radial Deviation 35/15 10/20  15/20 15/20   Composite Wrist Extension/Flexion NT NT            STRENGTH: Not tested due to precautions  (Measured in pounds using a Dynamometer and pinch meter)    Right   Left      Setting 2  Deferred   Deferred      Average       Key       3 Pt       Tip            Manual Muscle Test:  Thumb MP ext: mild pain: 4/5  Wrist ext: " mild pain: 4/5        Treatment     Mitesh received the treatments listed below:     Supervised modalities after being cleared for contradictions: Hot Pack Left Elbow- x 10 min to increase soft tissue extensibility      Mitesh received the following manual therapy techniques for 8 minutes:   Manual Lymphatic Drainage, Soft tissue Mobilization, and Friction Massage were applied to the:  IASTM to incision and surrounding area and extensor wad, flexor wad  PROM wrist flexion/extension 10+ reps - NT  PROM pronation - NT      Therapeutic exercises to develop ROM and flexibility for 40 minutes, including:  AROM elbow flex/extension with fa in 3 planes, 10 each way  AROM supination/pro 10 ea  AROM wrist flex/extension (open and close fist) 10 ea  Scapular retraction x 15 reps   Shoulder circles x 10 reps ea way  Wall slides for shoulder flex and abduction (10 ea) - NT  Bent over Ws and Rows (2 x 10 ea)  ER at 90/90* with 1# (2 x 10)  SA wall slides (2 x 10)  Yellow theraputty  (2 min), 3 pt pinches (3 logs), dowel presses (2 min)   Wrist curls, 1#, 3 ways - elbow at 90* and supported - (2 x 10)    Applied KT I band along APL/EPB pathway for inhibition and I band along dorsal wrist for stabilization      Mitesh participated in dynamic functional therapeutic activities to improve functional performance for 0 minutes, including:  - Wrist wheel for wrist/elbow flex/ext and pro/sup (3 min ea)  - Wrist cage (3 min)  - Rotate ball around body (10+ ea direction)      Patient Education and Home Exercises      Education provided:   - Reviewed HEP  - Progress towards goals     Written Home Exercises Provided: yes. Theraputty tasks and 1-2# wrist curls, 1x/day  Exercises were reviewed and Mitesh was able to demonstrate them prior to the end of the session.  Mitesh demonstrated good  understanding of the HEP provided. See EMR under Patient Instructions for exercises provided during therapy sessions.      ASSESSMENT     Mitesh is  progressing very well. He notes improved in wrist pain and swelling with KT wear. Progressed with gentle  and wrist strengthening with elbow in 90* and supported. No elbow strengthening per MD until 10 weeks post op. Will progress as appropriate.    Mitesh is progressing well towards his goals and there are no updates to goals at this time. Pt prognosis is Excellent.     Pt will continue to benefit from skilled outpatient occupational therapy to address the deficits listed in the problem list on initial evaluation, provide pt/family education and to maximize pt's level of independence in the home and community environment.     Pt's spiritual, cultural and educational needs considered and pt agreeable to plan of care and goals.    Anticipated barriers to occupational therapy: none    Goals:  Long Term Goals (to be met by discharge):  Date Goal Met:       1.) Mitesh Aquino will demonstrate significantly improved functional performance from re-assessment as measured by a FOTO Intake score of more than 75.     Goal Status:   In progress     2.) Mitesh Aquino will return to near to prior level of function for ADLs and household management reporting independence or modified independence.     Goal Status:   In progress     3. Mitesh Aquino will report pain 0 out of 10 at worst to increase functional use of LUE for work and leisure tasks.     Goal Status:   In progress      Short Term Goals (to be met by 10/19/23):  Date Goal Met:       Mitesh Aquino will be independent with home exercise program with written instructions.    9/19/23 Goal Status:  Met     Mitesh Aquino will demonstrate -10/140 degrees of elbow ext/flex to improve functional performance in ADLs/work/leisure tasks.    9/26/23 Goal Status:  Met     Mitesh Aquino will demonstrate within 20 lbs of  strength compared to other side to improve functional grasp for ADLs/work/leisure tasks.     Goal Status:   In progress     Mitesh Aquino will demonstrate the  ability to complete ADL/IADL tasks with 2/10 pain.    9/26/23 Goal Status:  Met         Plan      Pt to be treated by Occupational Therapy 3 times per week for 12 weeks during the certification period from 9/6/2023 to 11/29/23 to achieve the established goals.     Continue skilled occupational therapy with individualized plan of care focusing on progression along protocol.     Updates/Grading for next session: Progress as tolerated per protocol.     Cintia Davison, OT

## 2023-10-06 ENCOUNTER — PATIENT MESSAGE (OUTPATIENT)
Dept: SPORTS MEDICINE | Facility: CLINIC | Age: 43
End: 2023-10-06
Payer: COMMERCIAL

## 2023-10-06 ENCOUNTER — CLINICAL SUPPORT (OUTPATIENT)
Dept: REHABILITATION | Facility: HOSPITAL | Age: 43
End: 2023-10-06
Payer: OTHER MISCELLANEOUS

## 2023-10-06 ENCOUNTER — PATIENT MESSAGE (OUTPATIENT)
Dept: REHABILITATION | Facility: HOSPITAL | Age: 43
End: 2023-10-06
Payer: COMMERCIAL

## 2023-10-06 DIAGNOSIS — M25.622 DECREASED RANGE OF MOTION OF LEFT ELBOW: ICD-10-CM

## 2023-10-06 DIAGNOSIS — M25.522 LEFT ELBOW PAIN: Primary | ICD-10-CM

## 2023-10-06 PROCEDURE — 97530 THERAPEUTIC ACTIVITIES: CPT

## 2023-10-06 PROCEDURE — 97010 HOT OR COLD PACKS THERAPY: CPT

## 2023-10-06 PROCEDURE — 97140 MANUAL THERAPY 1/> REGIONS: CPT

## 2023-10-06 PROCEDURE — 97110 THERAPEUTIC EXERCISES: CPT

## 2023-10-06 NOTE — PROGRESS NOTES
"  [facility]  Occupational Therapy Daily Treatment Note    Date: 10/6/2023  Name: Mitesh Aquino  Clinic Number: [id number]    Therapy Diagnosis:   Encounter Diagnoses   Name Primary?    Left elbow pain Yes    Decreased range of motion of left elbow            Physician: Abhay Camilo     Physician Orders: Physician Orders: Updated 9/21/2023:"May discontinue the thermoplastic splint.  Instructed to wear the splint in environments where he could fall or sustained an inversion injury but otherwise may discontinue.  Begin regaining terminal elbow and forearm range of motion.  No lifting more than a cock can or small book.  No strengthening at this time.  That starts at around 4 weeks."  (10/19/2023)    Medical Diagnosis:  Z98.890 (ICD-10-CM) - Post-operative state S46.212A (ICD-10-CM) - Rupture of left distal biceps tendon, initial encounter      Date of Injury: 5/19/23  Date of Surgery: 8/9/2023   PROCEDURES PERFORMED:   Left distal biceps open achilles allograft reconstruction      Evaluation Date: 9/6/2023  Authorization Period: 8/21/23 - 8/6/24  Plan of Care Expiration: 11/29/23  Visit #/ Visits Authorized: 13/ 12  FOTO Completion: Initial eval (9/6/2023)  FOTO #2:  FOTO #3:  FOTO: date and score     Precautions:  Standard, tendon protocol - Updated on 5/20/2023 : cleared to d/c the orthotic except in public. No elbow strengthening per MD for another 4 weeks.     10/6/2023: Pt is 8 weeks and 2 days post-op    Time In: 10:00 am  Time Out: 11:08 am  Total Billable Time: 65 minutes      SUBJECTIVE   History of Current Condition: Mitesh Aquino is a 43 y.o. year old Right hand dominant male who sustained a Left distal biceps rupture at work on 5/19/23 when he slipped from a raftor and grabbed the beam above him and felt a pop. He received MRI imaging on 7/19/23 which revealed "chronic tear distal biceps tendon and stump of retracted biceps approximately 8 cm proximal (level of the " "distal humerus)". He elected to undergo surgery at that time which consisted of allograft distal biceps reconstruction on 8/9/23    Involved Side: Left  Dominant Side: Right     Pt reports that "My wrist has been aggravating." Stated that Ktape helps reduce the pain.   He was compliant with home exercise program given last session.   Response to previous treatment:increase in passive flex  Functional change: NA     Pain: 0/10 elbow  1/10 wrist  Location: left wrist    OBJECTIVE   Objective Measures updated at progress report unless specified.    Mental status: alert, oriented x3     Observation/Appearance:   11.5 cm incision in length along anterior elbow, fully closed, no signs of infection. Thick with limited mobility  Mild swelling about elbow     Sensation: Patient denies numbness/tingling        Edema:  (Measured circumferential, in centimeters)  Hand/Wrist: Right  9/6/2023 Left  9/6/2023 Left  9/19/23   Wrist Crease 16.0 cm 16.3 cm 16.6      Elbow: Right  9/6/2023 Left  9/6/2023 Left  9/19/23   3 Inches distal to elbow crease 25 cm 22.3 cm 23.3   Elbow crease 27.8 cm 29 cm 27.5   3 Inches proximal to elbow crease 27.5 cm 26.5 cm 25.5         ELBOW, WRIST RANGE OF MOTION: Measured in degrees of active motion with goniometer     All shoulder ROM is WNL, Full digit and thumb ROM bilaterally       Right  9/6/23 Left  9/6/23 Left  9/15/2023 AROM post heat and tx Left  9/19/23  PRE TX Left  10/6/2023  PRE TX   Elbow Extension/Flexion -5/150 -50/140  (Neutral forearm, AAROM/PROM) -33/145 -30/145 -8/144   Pronation/Supination 80/80 50/65  (Active/passive) 69/80 60/80 72/85   Wrist Extension/Flexion 70/85 55/55 78/88 65/68 65/76   Ulnar/Radial Deviation 35/15 10/20  15/20 26/25   Composite Wrist Extension/Flexion NT NT            STRENGTH: Not tested due to precautions  (Measured in pounds using a Dynamometer and pinch meter)    Right   Left      10/6/2023 10/6/2023    Setting 2 100 25    Average       Key 25  " 20    3 Pt  25  10   Tip                   Treatment     Mitesh received the treatments listed below:     Supervised modalities after being cleared for contradictions: Hot Pack Left Elbow- x 10 min to increase soft tissue extensibility      Manual therapy techniques: 10 minutes  Retrograde massage  PROM wrist flexion/extension 10+ reps  Application of K Tape to unload first dorsal compartment and space correction to decrease pain    Therapeutic exercises to develop ROM and flexibility for 30 minutes, including:  Reassessment of arom  Baseline assessment of  and pinch strengths  AROM elbow flex/extension with fa in 3 planes, 2/10 each way, in pain-free range  AROM supination/pro 10 ea  AROM wrist flex/extension (open and close fist) 10+ reps ea   Scapular retraction x 10 reps (NT)  Shoulder circles x 10 reps ea way (NT)  Wall slides for shoulder flex  (30)    Mitesh participated in dynamic functional therapeutic activities to improve functional performance for 15 minutes, including:  - Wrist cage (3 min)  - twisting: yellow therabar: wrist flex, wrist extension  -Sustained dynamic : calibrated gripper with gold spring in 2nd slot: moving small yellow sponge cubes x 2 sets  - putty [place] cutting using small dowel x 3 min  - Wrist wheel for wrist/elbow flex/ext and pro/sup (3 min ea) (NT)  - Rotate ball around body (10 ea direction) (NT)    Demonstrated and instructed in gravity assisted elbow extension stretch (elbow extended, allowing weight of hand (no added weight) to provide gentle stretch 1 min, x 3 reps as tolerated) (NT)      Patient Education and Home Exercises      Education provided:   - Reviewed HEP  - Progress towards goals     Written Home Exercises Provided: Patient instructed to cont prior HEP.  Exercises were reviewed and Mitesh was able to demonstrate them prior to the end of the session.  Mitesh demonstrated good  understanding of the HEP provided. See EMR under Patient Instructions for  exercises provided during therapy sessions.      ASSESSMENT   Upon formal reassessment. Mitesh shows very good increase in elbow extension and wrist flexion. Baseline assessment of  and pinch strengths reveals significant weakness. C/o radial and dorsal wrist into the thumb. Negative [place]. Utilizing K Tape to address this. Monitor.      Mitesh is progressing well towards his goals and there are no updates to goals at this time. Pt prognosis is Excellent.     Pt will continue to benefit from skilled outpatient occupational therapy to address the deficits listed in the problem list on initial evaluation, provide pt/family education and to maximize pt's level of independence in the home and community environment.     Pt's spiritual, cultural and educational needs considered and pt agreeable to plan of care and goals.    Anticipated barriers to occupational therapy: none    Goals:  Long Term Goals (to be met by discharge):  Date Goal Met:       1.) Mitesh Aquino will demonstrate significantly improved functional performance from re-assessment as measured by a FOTO Intake score of more than 75.     Goal Status:   In progress     2.) Mitesh Aquino will return to near to prior level of function for ADLs and household management reporting independence or modified independence.     Goal Status:   In progress     3. Mitesh Aquino will report pain 0 out of 10 at worst to increase functional use of LUE for work and leisure tasks.     Goal Status:   In progress      Short Term Goals (to be met by 10/19/23):  Date Goal Met:       Mitesh Aquino will be independent with home exercise program with written instructions.    9/19/23 Goal Status:  Met     Mitesh Aquino will demonstrate -10/140 degrees of elbow ext/flex to improve functional performance in ADLs/work/leisure tasks.     Goal Status:   In progress - met for flexion     Mitesh Aquino will demonstrate within 20 lbs of  strength compared to other side to improve  functional grasp for ADLs/work/leisure tasks.     Goal Status:   In progress     Mitesh Aquino will demonstrate the ability to complete ADL/IADL tasks with 2/10 pain.     Goal Status:   In progress         Plan      Pt to be treated by Occupational Therapy 3 times per week for 12 weeks during the certification period from 9/6/2023 to 11/29/23 to achieve the established goals.     Continue skilled occupational therapy with individualized plan of care focusing on progression along protocol.     Updates/Grading for next session: Monitor wrist pain. Add ultrasound and IASTM if persists. Progress as tolerated per protocol.     Michelle Alfred, OT

## 2023-10-09 ENCOUNTER — TELEPHONE (OUTPATIENT)
Dept: SPORTS MEDICINE | Facility: CLINIC | Age: 43
End: 2023-10-09
Payer: COMMERCIAL

## 2023-10-09 NOTE — TELEPHONE ENCOUNTER
Spoke to patient. Referral were sent on Sept 26 and 27th. Follow-up email was sent on 10/6/23. Told to have  reach out to [facility]. Patient verbalized understanding.     Kaley Diamond ATC, OTC  Sports Medicine Assistant - Dr. Taj Mccall   [facility] [place]

## 2023-10-11 ENCOUNTER — PATIENT MESSAGE (OUTPATIENT)
Dept: REHABILITATION | Facility: HOSPITAL | Age: 43
End: 2023-10-11
Payer: COMMERCIAL

## 2023-10-17 ENCOUNTER — PATIENT MESSAGE (OUTPATIENT)
Dept: REHABILITATION | Facility: HOSPITAL | Age: 43
End: 2023-10-17
Payer: COMMERCIAL

## 2023-10-18 ENCOUNTER — PATIENT MESSAGE (OUTPATIENT)
Dept: REHABILITATION | Facility: HOSPITAL | Age: 43
End: 2023-10-18
Payer: COMMERCIAL

## 2023-10-23 NOTE — PROGRESS NOTES
"  [facility]  Occupational Therapy Daily Treatment Note    Date: 10/24/2023  Name: Mitehs Aquino  Clinic Number: [id number]    Therapy Diagnosis:   Encounter Diagnoses   Name Primary?    Left elbow pain Yes    Decreased range of motion of left elbow              Physician: CHRISSY Mccall MD     Physician Orders: Physician Orders: Updated 9/21/2023:"May discontinue the thermoplastic splint.  Instructed to wear the splint in environments where he could fall or sustained an inversion injury but otherwise may discontinue.  Begin regaining terminal elbow and forearm range of motion.  No lifting more than a coke can or small book.  No strengthening at this time.  That starts at around 4 weeks."  (10/19/2023)    Medical Diagnosis:  Z98.890 (ICD-10-CM) - Post-operative state S46.212A (ICD-10-CM) - Rupture of left distal biceps tendon, initial encounter      Date of Injury: 5/19/23  Date of Surgery: 8/9/2023   PROCEDURES PERFORMED:   Left distal biceps open achilles allograft reconstruction      Evaluation Date: 9/6/2023  Authorization Period: 8/21/23 - 8/6/24  Plan of Care Expiration: 11/29/23  Visit #/ Visits Authorized: 13/ 12  FOTO Completion: Initial eval (9/6/2023)  FOTO #2:  FOTO #3:  FOTO: date and score     Precautions:  Standard, tendon protocol - Updated on 5/20/2023 : cleared to d/c the orthotic except in public. No elbow strengthening per MD for another 4 weeks.     10/24/2023: Pt is 10 weeks and 6 days post-op    Time In: 10:00 am  Time Out: 10:55 am  Total Billable Time: 52 minutes      SUBJECTIVE   History of Current Condition: Mitesh Aquino is a 43 y.o. year old Right hand dominant male who sustained a Left distal biceps rupture at work on 5/19/23 when he slipped from a raftor and grabbed the beam above him and felt a pop. He received MRI imaging on 7/19/23 which revealed "chronic tear distal biceps tendon and stump of retracted biceps approximately 8 cm proximal (level of the " "distal humerus)". He elected to undergo surgery at that time which consisted of allograft distal biceps reconstruction on 8/9/23    Involved Side: Left  Dominant Side: Right     Pt reports that "the only thing that bothers me is this area here." Indicating the proximal end of the surgery scar" "It feels like it's pinching sometimes."    He was compliant with home exercise program given last session.   Response to previous treatment: Increase in rom  Functional change: "I haven't been using it. Other than maybe a grocery bag every now and then or a drink."    Pain: 0/10   Location: NA    OBJECTIVE   Objective Measures updated at progress report unless specified.    Mental status: alert, oriented x3     Observation/Appearance:   11.5 cm incision in length along anterior elbow, fully closed, no signs of infection. Thick with limited mobility  Mild swelling about elbow     Sensation: Patient denies numbness/tingling        Edema:  (Measured circumferential, in centimeters)  Hand/Wrist: Right  9/6/2023 Left  9/6/2023 Left  9/19/23   Wrist Crease 16.0 cm 16.3 cm 16.6      Elbow: Right  9/6/2023 Left  9/6/2023 Left  9/19/23   3 Inches distal to elbow crease 25 cm 22.3 cm 23.3   Elbow crease 27.8 cm 29 cm 27.5   3 Inches proximal to elbow crease 27.5 cm 26.5 cm 25.5         ELBOW, WRIST RANGE OF MOTION: Measured in degrees of active motion with goniometer     All shoulder ROM is WNL, Full digit and thumb ROM bilaterally       Right  9/6/23 Left  9/6/23 Left  9/15/2023 AROM post heat and tx Left  9/19/23  PRE TX Left  10/6/2023  PRE TX Left   10/24/2023  PRE TX   Elbow Extension/Flexion -5/150 -50/140  (Neutral forearm, AAROM/PROM) -33/145 -30/145 -8/144 -7/145   Pronation/Supination 80/80 50/65  (Active/passive) 69/80 60/80 72/85 wnl   Wrist Extension/Flexion 70/85 55/55 78/88 65/68 65/76 67/85   Ulnar/Radial Deviation 35/15 10/20  15/20 26/25    Composite Wrist Extension/Flexion NT NT             STRENGTH: Not tested due " to precautions  (Measured in pounds using a Dynamometer and pinch meter)    Right   Left   Left    10/6/2023 10/6/2023 10/24/2023    Setting 2 100 25 43    Average        Key 25  20  20   3 Pt  25  10 15   Tip                    Treatment     Mitesh received the treatments listed below:     Supervised modalities NT due to no pain of stiffness    Manual therapy techniques: 1 min review of deep friction massage to proximal portion of sx scar.    Therapeutic exercises to develop flexibility, endurance and strength  for 40 minutes, including:  Reassessment of arom  Reassessment of  and pinch strengths  AROM wrist flex/extension (open and close fist) 10+ reps ea  AROM supination/pro 10 ea  AROM elbow  with fa in 3 planes 1/10 each way  Elbow pre's with fa in 2 planes (neutral and sup), 3/10 each way, with 2#  Elbow pre's with fa in pro 3/10 with 1#  Wrist pre's with fa in 3 planes, 3/10 each way, with 2#  FA pre's sup/pro 3/10 with 1#  Scapular retraction x 10 reps (NT)  Shoulder circles x 10 reps ea way (N    Mitesh participated in dynamic functional therapeutic activities to improve functional performance for 12 minutes, including:    - Twisting: red therabar: wrist flex, wrist extension, fa sup, fa pro, wrist rd, wrist ud x 20 reps ea  -Sustained dynamic : calibrated gripper with sliver spring in 4th slot: moving wooden pegs x 3 min with 1 rest after 2 min        Patient Education and Home Exercises      Education provided:   - Reviewed HEP  - Progress towards goals     Written Home Exercises Provided: Patient instructed to cont prior HEP.  Exercises were reviewed and Mitesh was able to demonstrate them prior to the end of the session.  Mitesh demonstrated good  understanding of the HEP provided. See EMR under Patient Instructions for exercises provided during therapy sessions.      ASSESSMENT   Upon formal reassessment. Mitesh shows full arom of the elbow, fa, and wrist.Good increases note in    and pinch strengths though significant weakness of  remains. Pain has resolved.    Mitesh is progressing well towards his goals and there are no updates to goals at this time. Pt prognosis is Excellent.     Pt will continue to benefit from skilled outpatient occupational therapy to address the deficits listed in the problem list on initial evaluation, provide pt/family education and to maximize pt's level of independence in the home and community environment.     Pt's spiritual, cultural and educational needs considered and pt agreeable to plan of care and goals.    Anticipated barriers to occupational therapy: none    Goals:  Long Term Goals (to be met by discharge):  Date Goal Met:       1.) Mitesh Aquino will demonstrate significantly improved functional performance from re-assessment as measured by a FOTO Intake score of more than 75.     Goal Status:   In progress     2.) Mitesh Aquino will return to near to prior level of function for ADLs and household management reporting independence or modified independence.     Goal Status:   In progress     3. Mitesh Aquino will report pain 0 out of 10 at worst to increase functional use of LUE for work and leisure tasks.     Goal Status:   In progress      Short Term Goals (to be met by 10/19/23):  Date Goal Met:       Mitesh Aquino will be independent with home exercise program with written instructions.    9/19/23 Goal Status:  Met     Mitesh Aquino will demonstrate -10/140 degrees of elbow ext/flex to improve functional performance in ADLs/work/leisure tasks.     Goal Status:   In progress - met for flexion     Mitesh Aquino will demonstrate within 20 lbs of  strength compared to other side to improve functional grasp for ADLs/work/leisure tasks.     Goal Status:   In progress     Mitesh Aquino will demonstrate the ability to complete ADL/IADL tasks with 2/10 pain.     Goal Status:   In progress         Plan      Pt to be treated by Occupational Therapy 3 times per  week for 12 weeks during the certification period from 9/6/2023 to 11/29/23 to achieve the established goals.     Continue skilled occupational therapy with individualized plan of care focusing on progression along protocol.     Updates/Grading for next session: Monitor wrist pain. Add ultrasound and IASTM if persists. Progress as tolerated per protocol.     Michelle Alfred OT

## 2023-10-24 ENCOUNTER — CLINICAL SUPPORT (OUTPATIENT)
Dept: REHABILITATION | Facility: HOSPITAL | Age: 43
End: 2023-10-24
Payer: OTHER MISCELLANEOUS

## 2023-10-24 DIAGNOSIS — M25.522 LEFT ELBOW PAIN: Primary | ICD-10-CM

## 2023-10-24 DIAGNOSIS — M25.622 DECREASED RANGE OF MOTION OF LEFT ELBOW: ICD-10-CM

## 2023-10-24 PROCEDURE — 97110 THERAPEUTIC EXERCISES: CPT

## 2023-10-24 PROCEDURE — 97530 THERAPEUTIC ACTIVITIES: CPT

## 2023-10-26 ENCOUNTER — CLINICAL SUPPORT (OUTPATIENT)
Dept: REHABILITATION | Facility: HOSPITAL | Age: 43
End: 2023-10-26
Payer: OTHER MISCELLANEOUS

## 2023-10-26 DIAGNOSIS — M25.622 DECREASED RANGE OF MOTION OF LEFT ELBOW: ICD-10-CM

## 2023-10-26 DIAGNOSIS — M25.522 LEFT ELBOW PAIN: Primary | ICD-10-CM

## 2023-10-26 PROCEDURE — 97530 THERAPEUTIC ACTIVITIES: CPT

## 2023-10-26 PROCEDURE — 97110 THERAPEUTIC EXERCISES: CPT

## 2023-10-26 NOTE — PROGRESS NOTES
"  [facility]  Occupational Therapy Daily Treatment Note    Date: 10/26/2023  Name: Mitesh Aquino  Clinic Number: [id number]    Therapy Diagnosis:   Encounter Diagnoses   Name Primary?    Left elbow pain Yes    Decreased range of motion of left elbow      Physician: CHRISSY Mccall MD     Physician Orders: Physician Orders: Updated 9/21/2023:"May discontinue the thermoplastic splint.  Instructed to wear the splint in environments where he could fall or sustained an inversion injury but otherwise may discontinue.  Begin regaining terminal elbow and forearm range of motion.  No lifting more than a coke can or small book.  No strengthening at this time.  That starts at around 4 weeks."  (10/19/2023)    Medical Diagnosis:  Z98.890 (ICD-10-CM) - Post-operative state S46.212A (ICD-10-CM) - Rupture of left distal biceps tendon, initial encounter      Date of Injury: 5/19/23  Date of Surgery: 8/9/2023   PROCEDURES PERFORMED:   Left distal biceps open achilles allograft reconstruction      Evaluation Date: 9/6/2023  Authorization Period: 8/21/23 - 8/6/24  Plan of Care Expiration: 11/29/23  Visit #/ Visits Authorized:  2 (+13) / 12  FOTO Completion: Initial eval (9/6/2023)  FOTO #2:  FOTO #3:  FOTO: date and score     Precautions:  Standard, tendon protocol - Updated on 5/20/2023 : cleared to d/c the orthotic except in public. No elbow strengthening per MD for another 4 weeks.     10/24/2023: Pt is 10 weeks and 6 days post-op    Time In: 10:00 am  Time Out: 10:55 am  Total Billable Time: 55 minutes      SUBJECTIVE     History of Current Condition: Mitesh Aquino is a 43 y.o. year old Right hand dominant male who sustained a Left distal biceps rupture at work on 5/19/23 when he slipped from a raftor and grabbed the beam above him and felt a pop. He received MRI imaging on 7/19/23 which revealed "chronic tear distal biceps tendon and stump of retracted biceps approximately 8 cm proximal (level of the " "distal humerus)". He elected to undergo surgery at that time which consisted of allograft distal biceps reconstruction on 8/9/23.     Involved Side: Left  Dominant Side: Right     Pt reports: "I felt fine after last session's exercises, no pain"    He was compliant with home exercise program given last session.   Response to previous treatment: Increase in rom  Functional change: "I haven't been using it. Other than maybe a grocery bag every now and then or a drink."    Pain: 0/10   Location: NA    OBJECTIVE   Objective Measures updated at progress report unless specified.    Mental status: alert, oriented x3     Observation/Appearance:   11.5 cm incision in length along anterior elbow, fully closed, no signs of infection. Thick with limited mobility  Mild swelling about elbow     Sensation: Patient denies numbness/tingling        Edema:  (Measured circumferential, in centimeters)  Hand/Wrist: Right  9/6/2023 Left  9/6/2023 Left  9/19/23   Wrist Crease 16.0 cm 16.3 cm 16.6      Elbow: Right  9/6/2023 Left  9/6/2023 Left  9/19/23   3 Inches distal to elbow crease 25 cm 22.3 cm 23.3   Elbow crease 27.8 cm 29 cm 27.5   3 Inches proximal to elbow crease 27.5 cm 26.5 cm 25.5         ELBOW, WRIST RANGE OF MOTION: Measured in degrees of active motion with goniometer     All shoulder ROM is WNL, Full digit and thumb ROM bilaterally       Right  9/6/23 Left  9/6/23 Left  9/15/2023 AROM post heat and tx Left  9/19/23  PRE TX Left  10/6/2023  PRE TX Left   10/24/2023  PRE TX   Elbow Extension/Flexion -5/150 -50/140  (Neutral forearm, AAROM/PROM) -33/145 -30/145 -8/144 -7/145   Pronation/Supination 80/80 50/65  (Active/passive) 69/80 60/80 72/85 wnl   Wrist Extension/Flexion 70/85 55/55 78/88 65/68 65/76 67/85   Ulnar/Radial Deviation 35/15 10/20  15/20 26/25    Composite Wrist Extension/Flexion NT NT             STRENGTH: Not tested due to precautions  (Measured in pounds using a Dynamometer and pinch meter)    Right   Left   " Left    10/6/2023 10/6/2023 10/24/2023    Setting 2 100 25 43    Average        Key 25  20  20   3 Pt  25  10 15   Tip                    Treatment     Mitesh received the treatments listed below:     Supervised modalities NT due to no pain of stiffness    Therapeutic exercises to develop flexibility, endurance and strength for 43 minutes, including:  AROM wrist flex/extension (open and close fist) 10+ reps ea  AROM supination/pro 10 ea  AROM elbow  with fa in 3 planes 1/10 each way  Elbow pre's with fa in 2 planes (neutral and sup), 3/10 each way, with 2#  Elbow pre's with fa in pro 3/10 with 1#  Wrist pre's with fa in 3 planes, 3/10 each way, with 2#  FA pre's sup/pro 3/10 with 1#  Scapular retraction x 10 reps (NT)  Shoulder circles x 10 reps ea way (N    Mitesh participated in dynamic functional therapeutic activities to improve functional performance for 12 minutes, including:  - Twisting: red therabar: wrist flex, wrist extension, fa sup, fa pro, wrist rd, wrist ud x 20 reps ea  -Sustained dynamic : calibrated gripper with sliver spring in 4th slot: moving wooden pegs x 3 min with 1 rest after 2 min        Patient Education and Home Exercises      Education provided:   - Reviewed HEP  - Progress towards goals     Written Home Exercises Provided: Patient instructed to cont prior HEP.  Exercises were reviewed and Mitesh was able to demonstrate them prior to the end of the session.  Mitesh demonstrated good  understanding of the HEP provided. See EMR under Patient Instructions for exercises provided during therapy sessions.      ASSESSMENT     Mitesh tolerated OT session well without pain. He performed all exercises well with no cueing required. He is compliant with his home exercise program and progressing well for 10 weeks post-op.     Mitesh is progressing well towards his goals and there are no updates to goals at this time. Pt prognosis is Excellent.     Pt will continue to benefit from skilled  outpatient occupational therapy to address the deficits listed in the problem list on initial evaluation, provide pt/family education and to maximize pt's level of independence in the home and community environment.     Pt's spiritual, cultural and educational needs considered and pt agreeable to plan of care and goals.    Anticipated barriers to occupational therapy: none    Goals:  Long Term Goals (to be met by discharge):  Date Goal Met:       1.) Mitesh Aquino will demonstrate significantly improved functional performance from re-assessment as measured by a FOTO Intake score of more than 75.     Goal Status:   In progress     2.) Mitesh Aquino will return to near to prior level of function for ADLs and household management reporting independence or modified independence.     Goal Status:   In progress     3. Mitesh Aquino will report pain 0 out of 10 at worst to increase functional use of LUE for work and leisure tasks.     Goal Status:   In progress      Short Term Goals (to be met by 10/19/23):  Date Goal Met:       Mitesh Aquino will be independent with home exercise program with written instructions.    9/19/23 Goal Status:  Met     Mitesh Aquino will demonstrate -10/140 degrees of elbow ext/flex to improve functional performance in ADLs/work/leisure tasks.     Goal Status:   In progress - met for flexion     Mitesh Aquino will demonstrate within 20 lbs of  strength compared to other side to improve functional grasp for ADLs/work/leisure tasks.     Goal Status:   In progress     Mitesh Aquino will demonstrate the ability to complete ADL/IADL tasks with 2/10 pain.     Goal Status:   In progress         Plan      Pt to be treated by Occupational Therapy 3 times per week for 12 weeks during the certification period from 9/6/2023 to 11/29/23 to achieve the established goals.     Continue skilled occupational therapy with individualized plan of care focusing on progression along protocol.     Updates/Grading for  next session: Monitor wrist pain. Add ultrasound and IASTM if persists. Progress as tolerated per protocol.     Sima Vanessa, OT

## 2023-10-31 ENCOUNTER — OFFICE VISIT (OUTPATIENT)
Dept: SPORTS MEDICINE | Facility: CLINIC | Age: 43
End: 2023-10-31
Payer: OTHER MISCELLANEOUS

## 2023-10-31 ENCOUNTER — CLINICAL SUPPORT (OUTPATIENT)
Dept: REHABILITATION | Facility: HOSPITAL | Age: 43
End: 2023-10-31
Payer: OTHER MISCELLANEOUS

## 2023-10-31 VITALS
BODY MASS INDEX: 29.98 KG/M2 | HEART RATE: 68 BPM | DIASTOLIC BLOOD PRESSURE: 79 MMHG | SYSTOLIC BLOOD PRESSURE: 115 MMHG | WEIGHT: 202.38 LBS | HEIGHT: 69 IN

## 2023-10-31 DIAGNOSIS — M25.522 LEFT ELBOW PAIN: Primary | ICD-10-CM

## 2023-10-31 DIAGNOSIS — S46.212D RUPTURE OF LEFT DISTAL BICEPS TENDON, SUBSEQUENT ENCOUNTER: Primary | ICD-10-CM

## 2023-10-31 DIAGNOSIS — M25.622 DECREASED RANGE OF MOTION OF LEFT ELBOW: ICD-10-CM

## 2023-10-31 PROCEDURE — 97530 THERAPEUTIC ACTIVITIES: CPT

## 2023-10-31 PROCEDURE — 99999 PR PBB SHADOW E&M-EST. PATIENT-LVL III: CPT | Mod: PBBFAC,,, | Performed by: ORTHOPAEDIC SURGERY

## 2023-10-31 PROCEDURE — 99024 PR POST-OP FOLLOW-UP VISIT: ICD-10-PCS | Mod: S$GLB,,, | Performed by: ORTHOPAEDIC SURGERY

## 2023-10-31 PROCEDURE — 99024 POSTOP FOLLOW-UP VISIT: CPT | Mod: S$GLB,,, | Performed by: ORTHOPAEDIC SURGERY

## 2023-10-31 PROCEDURE — 99999 PR PBB SHADOW E&M-EST. PATIENT-LVL III: ICD-10-PCS | Mod: PBBFAC,,, | Performed by: ORTHOPAEDIC SURGERY

## 2023-10-31 PROCEDURE — 97110 THERAPEUTIC EXERCISES: CPT

## 2023-10-31 NOTE — PROGRESS NOTES
CC: Left Distal Biceps Post-Op    DATE OF PROCEDURE: 8/9/2023   PROCEDURES PERFORMED:   Left distal biceps open achilles allograft reconstruction (CPT 91912-63)    Mitesh Aquino reports to be doing well just under 12 wk s/p the above mentioned procedure. Doing very well post operatively. He is in PT at [place] and progressing well. No pain in the elbow or biceps region.  No numbness or tingling.  Would like to go fishing if possible.    O:  Exam of the left elbow demonstrates a well healed surgical scar.  No scar hypertrophy.  No hypersensitivity to touch.  No signs of infections. Non tender to palpation throughout biceps. Able to palpate biceps tendon in antecubital fossa, tensioned well. Biceps atrophy noted but improving. Full elbow extension, able to flex hand to shoulder. Full forearm pronation/supination of forearm. NVI distally.     A/P:    -OK to begin strengthening in PT   -No lifting more than 5 lbs   -Follow up in 2 months for full release to activity   -OK to fish but provided specific restrictions.

## 2023-10-31 NOTE — PROGRESS NOTES
"  [facility]  Occupational Therapy Daily Treatment Note    Date: 10/31/2023  Name: Mitseh Aquino  Clinic Number: [id number]    Therapy Diagnosis:   Encounter Diagnoses   Name Primary?    Left elbow pain Yes    Decreased range of motion of left elbow        Physician: CHRISSY Mccall MD     Physician Orders: Physician Orders: Updated 9/21/2023:"May discontinue the thermoplastic splint.  Instructed to wear the splint in environments where he could fall or sustained an inversion injury but otherwise may discontinue.  Begin regaining terminal elbow and forearm range of motion.  No lifting more than a coke can or small book.  No strengthening at this time.  That starts at around 4 weeks."  (10/19/2023)    Medical Diagnosis:  Z98.890 (ICD-10-CM) - Post-operative state S46.212A (ICD-10-CM) - Rupture of left distal biceps tendon, initial encounter      Date of Injury: 5/19/23  Date of Surgery: 8/9/2023   PROCEDURES PERFORMED:   Left distal biceps open achilles allograft reconstruction      Evaluation Date: 9/6/2023  Authorization Period: 8/21/23 - 8/6/24  Plan of Care Expiration: 11/29/23  Visit #/ Visits Authorized:  3 (+13) / 12  FOTO Completion: Initial eval (9/6/2023)  FOTO #2:  FOTO #3:  FOTO: date and score     Precautions:  Standard, tendon protocol - Updated on 5/20/2023 : cleared to d/c the orthotic except in public. No elbow strengthening per MD for another 4 weeks.     10/24/2023: Pt is 10 weeks and 6 days post-op    Time In: 10:35 am  Time Out: 11:15 am  Total Billable Time: 40 minutes      SUBJECTIVE     History of Current Condition: Mitesh Aquino is a 43 y.o. year old Right hand dominant male who sustained a Left distal biceps rupture at work on 5/19/23 when he slipped from a raftor and grabbed the beam above him and felt a pop. He received MRI imaging on 7/19/23 which revealed "chronic tear distal biceps tendon and stump of retracted biceps approximately 8 cm proximal (level of the " "distal humerus)". He elected to undergo surgery at that time which consisted of allograft distal biceps reconstruction on 8/9/23.     Involved Side: Left  Dominant Side: Right     Pt reports: "I felt fine after last session's exercises, no pain"    He was compliant with home exercise program given last session.   Response to previous treatment: Increase in rom  Functional change: "I haven't been using it. Other than maybe a grocery bag every now and then or a drink."    Pain: 0/10   Location: NA    OBJECTIVE   Objective Measures updated at progress report unless specified.    Mental status: alert, oriented x3     Observation/Appearance:   11.5 cm incision in length along anterior elbow, fully closed, no signs of infection. Thick with limited mobility  Mild swelling about elbow     Sensation: Patient denies numbness/tingling        Edema:  (Measured circumferential, in centimeters)  Hand/Wrist: Right  9/6/2023 Left  9/6/2023 Left  9/19/23   Wrist Crease 16.0 cm 16.3 cm 16.6      Elbow: Right  9/6/2023 Left  9/6/2023 Left  9/19/23   3 Inches distal to elbow crease 25 cm 22.3 cm 23.3   Elbow crease 27.8 cm 29 cm 27.5   3 Inches proximal to elbow crease 27.5 cm 26.5 cm 25.5         ELBOW, WRIST RANGE OF MOTION: Measured in degrees of active motion with goniometer     All shoulder ROM is WNL, Full digit and thumb ROM bilaterally       Right  9/6/23 Left  9/6/23 Left  9/15/2023 AROM post heat and tx Left  9/19/23  PRE TX Left  10/6/2023  PRE TX Left   10/24/2023  PRE TX   Elbow Extension/Flexion -5/150 -50/140  (Neutral forearm, AAROM/PROM) -33/145 -30/145 -8/144 -7/145   Pronation/Supination 80/80 50/65  (Active/passive) 69/80 60/80 72/85 wnl   Wrist Extension/Flexion 70/85 55/55 78/88 65/68 65/76 67/85   Ulnar/Radial Deviation 35/15 10/20  15/20 26/25    Composite Wrist Extension/Flexion NT NT             STRENGTH: Not tested due to precautions  (Measured in pounds using a Dynamometer and pinch meter)    Right   Left   " Left    10/6/2023 10/6/2023 10/24/2023    Setting 2 100 25 43    Average        Key 25  20  20   3 Pt  25  10 15   Tip                    Treatment     Mitesh received the treatments listed below:     Supervised modalities NT due to no pain of stiffness    Therapeutic exercises to develop flexibility, endurance and strength for 15 minutes, including:  AROM wrist flex/extension (open and close fist) 10+ reps ea  AROM supination/pro 10 ea  AROM elbow  with fa in 3 planes 1/10 each way  Elbow pre's with fa in 2 planes (neutral and sup), 3/10 each way, with 3#  Elbow pre's with fa in pro 3/10 with 2#  Wrist pre's with fa in 3 planes, 3/10 each way, with 3#  FA pre's sup/pro 3/10 with 2#  Scapular retraction x 10 reps (NT)  Shoulder circles x 10 reps ea way (N      Mitesh participated in dynamic functional therapeutic activities to improve functional performance for 25 minutes, including:  BTE Simulator II                   Tool Plane of motion and time                   504   Wrist flex, wrist ext 45 sec ea   302  Rad dev, ulnar dev 45 sec   162  x 60 sec   162 lat pinch,3-jaw pinch 45 sec ea           Patient Education and Home Exercises      Education provided:   - Reviewed HEP  - Progress towards goals     Written Home Exercises Provided: Patient instructed to cont prior HEP.  Exercises were reviewed and Mitesh was able to demonstrate them prior to the end of the session.  Mitesh demonstrated good  understanding of the HEP provided. See EMR under Patient Instructions for exercises provided during therapy sessions.      ASSESSMENT     Mitesh tolerated OT session well without pain. He performed all exercises well with no cueing required. Introduced progressive strengthening with the BTE and he tolerated well. He will be 12 weeks post-op tomorrow.     Mitesh is progressing well towards his goals and there are no updates to goals at this time. Pt prognosis is Excellent.     Pt will continue to benefit from  skilled outpatient occupational therapy to address the deficits listed in the problem list on initial evaluation, provide pt/family education and to maximize pt's level of independence in the home and community environment.     Pt's spiritual, cultural and educational needs considered and pt agreeable to plan of care and goals.    Anticipated barriers to occupational therapy: none    Goals:  Long Term Goals (to be met by discharge):  Date Goal Met:       1.) Mitesh Aquino will demonstrate significantly improved functional performance from re-assessment as measured by a FOTO Intake score of more than 75.     Goal Status:   In progress     2.) Mitesh Aquino will return to near to prior level of function for ADLs and household management reporting independence or modified independence.     Goal Status:   In progress     3. Mitesh Aquino will report pain 0 out of 10 at worst to increase functional use of LUE for work and leisure tasks.     Goal Status:   In progress      Short Term Goals (to be met by 10/19/23):  Date Goal Met:       Mitesh Aquino will be independent with home exercise program with written instructions.    9/19/23 Goal Status:  Met     Mitesh Aquino will demonstrate -10/140 degrees of elbow ext/flex to improve functional performance in ADLs/work/leisure tasks.     Goal Status:   In progress - met for flexion     Mitesh Aquino will demonstrate within 20 lbs of  strength compared to other side to improve functional grasp for ADLs/work/leisure tasks.     Goal Status:   In progress     Mitesh Aquino will demonstrate the ability to complete ADL/IADL tasks with 2/10 pain.     Goal Status:   In progress         Plan      Pt to be treated by Occupational Therapy 3 times per week for 12 weeks during the certification period from 9/6/2023 to 11/29/23 to achieve the established goals.     Continue skilled occupational therapy with individualized plan of care focusing on progression along protocol.     Updates/Grading  for next session: Monitor wrist pain. Add ultrasound and IASTM if persists. Progress as tolerated per protocol.     Sima Vanessa, OT

## 2023-11-02 ENCOUNTER — CLINICAL SUPPORT (OUTPATIENT)
Dept: REHABILITATION | Facility: HOSPITAL | Age: 43
End: 2023-11-02
Payer: OTHER MISCELLANEOUS

## 2023-11-02 DIAGNOSIS — M25.622 DECREASED RANGE OF MOTION OF LEFT ELBOW: ICD-10-CM

## 2023-11-02 DIAGNOSIS — M25.522 LEFT ELBOW PAIN: Primary | ICD-10-CM

## 2023-11-02 PROCEDURE — 97530 THERAPEUTIC ACTIVITIES: CPT

## 2023-11-02 PROCEDURE — 97110 THERAPEUTIC EXERCISES: CPT

## 2023-11-02 NOTE — PROGRESS NOTES
"  [facility]  Occupational Therapy Daily Treatment Note    Date: 11/2/2023  Name: Mitesh Aquino  Clinic Number: [id number]    Therapy Diagnosis:   Encounter Diagnoses   Name Primary?    Left elbow pain Yes    Decreased range of motion of left elbow          Physician: CHRISSY Mccall MD     Physician Orders: Physician Orders: Updated 9/21/2023:"May discontinue the thermoplastic splint.  Instructed to wear the splint in environments where he could fall or sustained an inversion injury but otherwise may discontinue.  Begin regaining terminal elbow and forearm range of motion.  No lifting more than a coke can or small book.  No strengthening at this time.  That starts at around 4 weeks."  (10/19/2023)    Medical Diagnosis:  Z98.890 (ICD-10-CM) - Post-operative state S46.212A (ICD-10-CM) - Rupture of left distal biceps tendon, initial encounter      Date of Injury: 5/19/23  Date of Surgery: 8/9/2023   PROCEDURES PERFORMED:   Left distal biceps open achilles allograft reconstruction      Evaluation Date: 9/6/2023  Authorization Period: 8/21/23 - 8/6/24  Plan of Care Expiration: 11/29/23  Visit #/ Visits Authorized:  4 (+13) / 12  FOTO Completion: Initial eval (9/6/2023)  FOTO #2:  FOTO #3:  FOTO: date and score     Precautions:  Standard, tendon protocol - Updated on 5/20/2023 : cleared to d/c the orthotic except in public. No elbow strengthening per MD for another 4 weeks.     12 weeks post-op as of 11/1/23    Time In: 9:00  am  Time Out: 9:45 am  Total Billable Time: 45 minutes      SUBJECTIVE     History of Current Condition: Mitesh Aquino is a 43 y.o. year old Right hand dominant male who sustained a Left distal biceps rupture at work on 5/19/23 when he slipped from a raftor and grabbed the beam above him and felt a pop. 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 He elected to undergo surgery at that time which consisted of allograft distal biceps reconstruction on 8/9/23.     Involved Side: Left  Dominant Side: Right     Pt reports: he reports no pain after last session    He was compliant with home exercise program given last session.   Response to previous treatment: Increase in rom  Functional change: he's gradually using the left arm more for daily activities     Pain: 0/10   Location: NA    OBJECTIVE   Objective Measures updated at progress report unless specified.    Mental status: alert, oriented x3     Observation/Appearance:   11.5 cm incision in length along anterior elbow, fully closed, no signs of infection. Thick with limited mobility  Mild swelling about elbow     Sensation: Patient denies numbness/tingling        Edema:  (Measured circumferential, in centimeters)  Hand/Wrist: Right  9/6/2023 Left  9/6/2023 Left  9/19/23   Wrist Crease 16.0 cm 16.3 cm 16.6      Elbow: Right  9/6/2023 Left  9/6/2023 Left  9/19/23   3 Inches distal to elbow crease 25 cm 22.3 cm 23.3   Elbow crease 27.8 cm 29 cm 27.5   3 Inches proximal to elbow crease 27.5 cm 26.5 cm 25.5         ELBOW, WRIST RANGE OF MOTION: Measured in degrees of active motion with goniometer     All shoulder ROM is WNL, Full digit and thumb ROM bilaterally       Right  9/6/23 Left  9/6/23 Left  9/15/2023 AROM post heat and tx Left  9/19/23  PRE TX Left  10/6/2023  PRE TX Left   10/24/2023  PRE TX   Elbow Extension/Flexion -5/150 -50/140  (Neutral forearm, AAROM/PROM) -33/145 -30/145 -8/144 -7/145   Pronation/Supination 80/80 50/65  (Active/passive) 69/80 60/80 72/85 wnl   Wrist Extension/Flexion 70/85 55/55 78/88 65/68 65/76 67/85   Ulnar/Radial Deviation 35/15 10/20  15/20 26/25    Composite Wrist Extension/Flexion NT NT             STRENGTH: Not tested due to precautions  (Measured in pounds using a Dynamometer and pinch meter)    Right   Left   Left    10/6/2023 10/6/2023 10/24/2023    " Setting 2 100 25 43    Average        Key 25  20  20   3 Pt  25  10 15   Tip                    Treatment     Mitesh received the treatments listed below:     Supervised modalities NT due to no pain of stiffness    Therapeutic exercises to develop flexibility, endurance and strength for 15 minutes, including:  AROM wrist flex/extension (open and close fist) 10+ reps ea  AROM supination/pro 10 ea  AROM elbow  with fa in 3 planes 1/10 each way  Elbow pre's with fa in 2 planes (neutral and sup), 3/10 each way, with 3#  Elbow pre's with fa in pro 3/10 with 2#  Wrist pre's with fa in 3 planes, 3/10 each way, with 3#  FA pre's sup/pro 3/10 with 2#        Mitesh participated in dynamic functional therapeutic activities to improve functional performance for 30 minutes, including:  BTE Simulator II                   Tool Plane of motion and time                   504   Wrist flex, wrist ext 45 sec ea   302  Rad dev, ulnar dev 45 sec   162  x 60 sec   162 lat pinch,3-jaw pinch 45 sec ea           Patient Education and Home Exercises      Education provided:   - Reviewed HEP  - Progress towards goals     Written Home Exercises Provided: Patient instructed to cont prior HEP.  Exercises were reviewed and Mitesh was able to demonstrate them prior to the end of the session.  Mitesh demonstrated good  understanding of the HEP provided. See EMR under Patient Instructions for exercises provided during therapy sessions.      ASSESSMENT     Mitesh tolerated OT session well without pain. He performed all exercises well with no cueing required. Upgraded BTE exercises and he tolerated well.     Mitesh is progressing well towards his goals and there are no updates to goals at this time. Pt prognosis is Excellent.     Pt will continue to benefit from skilled outpatient occupational therapy to address the deficits listed in the problem list on initial evaluation, provide pt/family education and to maximize pt's level of independence  in the home and community environment.     Pt's spiritual, cultural and educational needs considered and pt agreeable to plan of care and goals.    Anticipated barriers to occupational therapy: none    Goals:  Long Term Goals (to be met by discharge):  Date Goal Met:       1.) Mitesh Aquino will demonstrate significantly improved functional performance from re-assessment as measured by a FOTO Intake score of more than 75.     Goal Status:   In progress     2.) Mitesh Aquino will return to near to prior level of function for ADLs and household management reporting independence or modified independence.     Goal Status:   In progress     3. Mitesh Aquino will report pain 0 out of 10 at worst to increase functional use of LUE for work and leisure tasks.     Goal Status:   In progress      Short Term Goals (to be met by 10/19/23):  Date Goal Met:       Mitesh Aquino will be independent with home exercise program with written instructions.    9/19/23 Goal Status:  Met     Mitesh Aquino will demonstrate -10/140 degrees of elbow ext/flex to improve functional performance in ADLs/work/leisure tasks.     Goal Status:   In progress - met for flexion     Mitesh Aquino will demonstrate within 20 lbs of  strength compared to other side to improve functional grasp for ADLs/work/leisure tasks.     Goal Status:   In progress     Mitesh Aquino will demonstrate the ability to complete ADL/IADL tasks with 2/10 pain.     Goal Status:   In progress         Plan      Re-assess  next session.     Pt to be treated by Occupational Therapy 3 times per week for 12 weeks during the certification period from 9/6/2023 to 11/29/23 to achieve the established goals.     Continue skilled occupational therapy with individualized plan of care focusing on progression along protocol.     Updates/Grading for next session: Monitor wrist pain. Add ultrasound and IASTM if persists. Progress as tolerated per protocol.     Sima Vanessa, OT

## 2023-11-07 ENCOUNTER — PATIENT MESSAGE (OUTPATIENT)
Dept: REHABILITATION | Facility: HOSPITAL | Age: 43
End: 2023-11-07
Payer: COMMERCIAL

## 2023-11-09 ENCOUNTER — CLINICAL SUPPORT (OUTPATIENT)
Dept: REHABILITATION | Facility: HOSPITAL | Age: 43
End: 2023-11-09
Payer: OTHER MISCELLANEOUS

## 2023-11-09 ENCOUNTER — TELEPHONE (OUTPATIENT)
Dept: SPORTS MEDICINE | Facility: CLINIC | Age: 43
End: 2023-11-09
Payer: COMMERCIAL

## 2023-11-09 DIAGNOSIS — S46.212D RUPTURE OF LEFT DISTAL BICEPS TENDON, SUBSEQUENT ENCOUNTER: Primary | ICD-10-CM

## 2023-11-09 DIAGNOSIS — M25.522 LEFT ELBOW PAIN: Primary | ICD-10-CM

## 2023-11-09 DIAGNOSIS — S46.212A RUPTURE OF LEFT DISTAL BICEPS TENDON, INITIAL ENCOUNTER: ICD-10-CM

## 2023-11-09 DIAGNOSIS — M25.622 DECREASED RANGE OF MOTION OF LEFT ELBOW: ICD-10-CM

## 2023-11-09 PROCEDURE — 97530 THERAPEUTIC ACTIVITIES: CPT

## 2023-11-09 PROCEDURE — 97110 THERAPEUTIC EXERCISES: CPT

## 2023-11-09 NOTE — TELEPHONE ENCOUNTER
----- Message from CHRISSY Mccall MD sent at 11/9/2023 12:19 PM CST -----  Regarding: FW: Work Conditioning  Noelle, can you place a referral to PT for work conditioning?  Finn.  Plan to start in 2-3 weeks. On patient Mitesh Aquino.      ----- Message -----  From: Michelle Alfred, OT  Sent: 11/9/2023  12:03 PM CST  To: CHRISSY Mccall MD; Manjula Fay, PT  Subject: Work Conditioning                                HI Dr. MccallMitesh is doing very well in therapy. He has full AROM and is without pain. His remaining deficits are in strength and endurance. We have been engaging him in light strengthening and endurance exercises. 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 He does not return to you until January. I feel it would serve him well to be discharged from OT and participate in a work conditioning program with our physical therapy team. If you are in agreement, would you please put in  a referral to PT for work conditioning.     Thank You,     Michelle

## 2023-11-09 NOTE — PROGRESS NOTES
"  [facility]  Occupational Therapy Daily Treatment Note    Date: 11/9/2023  Name: Mitesh Aquino  Clinic Number: [id number]    Therapy Diagnosis:   Encounter Diagnoses   Name Primary?    Left elbow pain Yes    Decreased range of motion of left elbow            Physician: CHRISSY Mccall MD     Physician Orders: Physician Orders: Updated 9/21/2023:"May discontinue the thermoplastic splint.  Instructed to wear the splint in environments where he could fall or sustained an inversion injury but otherwise may discontinue.  Begin regaining terminal elbow and forearm range of motion.  No lifting more than a coke can or small book.  No strengthening at this time.  That starts at around 4 weeks."  (10/19/2023)    Medical Diagnosis:  Z98.890 (ICD-10-CM) - Post-operative state S46.212A (ICD-10-CM) - Rupture of left distal biceps tendon, initial encounter      Date of Injury: 5/19/23  Date of Surgery: 8/9/2023   PROCEDURES PERFORMED:   Left distal biceps open achilles allograft reconstruction      Evaluation Date: 9/6/2023  Authorization Period: 8/21/23 - 8/6/24  Plan of Care Expiration: 11/29/23  Visit #/ Visits Authorized:  4 (+13) / 12  FOTO Completion: Initial eval (9/6/2023)  FOTO #2:  FOTO #3:  FOTO: date and score     Precautions:  Standard, tendon protocol - Updated on 5/20/2023 : cleared to d/c the orthotic except in public. No elbow strengthening per MD for another 4 weeks.     12 weeks post-op as of 11/1/23    Time In: 9:00  am  Time Out: 9:45 am  Total Billable Time: 45 minutes      SUBJECTIVE     History of Current Condition: Mitesh Aquino is a 43 y.o. year old Right hand dominant male who sustained a Left distal biceps rupture at work on 5/19/23 when he slipped from a raftor and grabbed the beam above him and felt a pop. 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 He elected to undergo surgery at that time which consisted of allograft distal biceps reconstruction on 8/9/23.     Involved Side: Left  Dominant Side: Right     Pt reports that he has not had pain. He reported fatigue at end of today's session.     He was compliant with home exercise program given last session.   Response to previous treatment: Increase in rom  Functional change: he's gradually using the left arm more for daily activities     Pain: 0/10   Location: NA    OBJECTIVE   Objective Measures updated at progress report unless specified.    Mental status: alert, oriented x3     Observation/Appearance:   11.5 cm incision in length along anterior elbow, fully closed, no signs of infection. Thick with limited mobility  Mild swelling about elbow     Sensation: Patient denies numbness/tingling        Edema:  (Measured circumferential, in centimeters)  Hand/Wrist: Right  9/6/2023 Left  9/6/2023 Left  9/19/23   Wrist Crease 16.0 cm 16.3 cm 16.6      Elbow: Right  9/6/2023 Left  9/6/2023 Left  9/19/23   3 Inches distal to elbow crease 25 cm 22.3 cm 23.3   Elbow crease 27.8 cm 29 cm 27.5   3 Inches proximal to elbow crease 27.5 cm 26.5 cm 25.5         ELBOW, WRIST RANGE OF MOTION: Measured in degrees of active motion with goniometer     All shoulder ROM is WNL, Full digit and thumb ROM bilaterally       Right  9/6/23 Left  9/6/23 Left  9/15/2023 AROM post heat and tx Left  9/19/23  PRE TX Left  10/6/2023  PRE TX Left   10/24/2023  PRE TX   Elbow Extension/Flexion -5/150 -50/140  (Neutral forearm, AAROM/PROM) -33/145 -30/145 -8/144 -7/145   Pronation/Supination 80/80 50/65  (Active/passive) 69/80 60/80 72/85 wnl   Wrist Extension/Flexion 70/85 55/55 78/88 65/68 65/76 67/85   Ulnar/Radial Deviation 35/15 10/20  15/20 26/25    Composite Wrist Extension/Flexion NT NT             STRENGTH: Not tested due to precautions  (Measured in pounds using a Dynamometer and pinch meter)    Right   Left   Left Left    " 10/6/2023 10/6/2023 10/24/2023 11/8/2023    Setting 2 100 25 43 49    Average         Key 25  20  20    3 Pt  25  10 15    Tip                     Treatment     Mitesh received the treatments listed below:     Supervised modalities MHP x 10 min to heat up tissues prior to strengthening      Therapeutic exercises to develop flexibility, endurance and strength for 25 minutes, including:  AROM wrist flex/extension (open and close fist) 10+ reps ea  AROM supination/pro 10 ea  AROM elbow  with fa in 3 planes 1/10 each way  Elbow pre's with fa in 2 planes (neutral and sup), 3/10 each way, with 5#  Elbow pre's with fa in pro 3/10 with 4#  Wrist pre's with fa in 3 planes, 3/10 each way, with 4#  FA pre's sup/pro 3/10 with 5#  Air bike level 4, for 3 minutes each way.      Mitesh participated in dynamic functional therapeutic activities to improve functional performance for 15 minutes, including:  BTE Simulator II                   Tool Plane of motion and time                   504   Wrist flex, wrist ext 45 sec ea   302  Rad dev, ulnar dev 45 sec   162  x 60 sec   162 lat pinch,3-jaw pinch 45 sec ea           Patient Education and Home Exercises      Education provided:   - Reviewed HEP  - Progress towards goals     Written Home Exercises Provided: Patient instructed to cont prior HEP.  Exercises were reviewed and Mitesh was able to demonstrate them prior to the end of the session.  Mitesh demonstrated good  understanding of the HEP provided. See EMR under Patient Instructions for exercises provided during therapy sessions.      ASSESSMENT     Mitesh tolerated OT session with report of fatigue at end of session. Sent chart message to Dr. Mccall regarding pt's status and his heavy work requirements. Mitesh is a good candidate for physical therapy work conditioning program to prepare him to resume full heavy work duties.   work conditioning Mitesh is progressing well towards his goals and there are no updates to  goals at this time. Pt prognosis is Excellent.     Pt will continue to benefit from skilled outpatient occupational therapy to address the deficits listed in the problem list on initial evaluation, provide pt/family education and to maximize pt's level of independence in the home and community environment.     Pt's spiritual, cultural and educational needs considered and pt agreeable to plan of care and goals.    Anticipated barriers to occupational therapy: none    Goals:  Long Term Goals (to be met by discharge):  Date Goal Met:       1.) Mitesh Aquino will demonstrate significantly improved functional performance from re-assessment as measured by a FOTO Intake score of more than 75.     Goal Status:   In progress     2.) Mitesh Aquino will return to near to prior level of function for ADLs and household management reporting independence or modified independence.     Goal Status:   In progress     3. Mitesh Aquino will report pain 0 out of 10 at worst to increase functional use of LUE for work and leisure tasks.     Goal Status:   In progress      Short Term Goals (to be met by 10/19/23):  Date Goal Met:       Mitesh Aquino will be independent with home exercise program with written instructions.    9/19/23 Goal Status:  Met     Mitesh Aquino will demonstrate -10/140 degrees of elbow ext/flex to improve functional performance in ADLs/work/leisure tasks.     Goal Status:   In progress -(MET)     Mitesh Aquino will demonstrate within 20 lbs of  strength compared to other side to improve functional grasp for ADLs/work/leisure tasks.     Goal Status:   In progress     Mitesh Aquino will demonstrate the ability to complete ADL/IADL tasks with 2/10 pain.      Goal Status:   In progress (MET)         Plan      Re-assess  next session.     Pt to be treated by Occupational Therapy 3 times per week for 12 weeks during the certification period from 9/6/2023 to 11/29/23 to achieve the established goals.     Continue skilled  occupational therapy with individualized plan of care focusing on progression along protocol.     Updates/Grading for next session:  Continue OT, progressing as tolerated per protocol until work conditioning is authorized and scheduled.    Michelle Alfred, OT

## 2023-11-14 ENCOUNTER — CLINICAL SUPPORT (OUTPATIENT)
Dept: REHABILITATION | Facility: HOSPITAL | Age: 43
End: 2023-11-14
Payer: OTHER MISCELLANEOUS

## 2023-11-14 DIAGNOSIS — S46.212A RUPTURE OF LEFT DISTAL BICEPS TENDON, INITIAL ENCOUNTER: ICD-10-CM

## 2023-11-14 DIAGNOSIS — M25.622 DECREASED RANGE OF MOTION OF LEFT ELBOW: ICD-10-CM

## 2023-11-14 DIAGNOSIS — M25.522 LEFT ELBOW PAIN: Primary | ICD-10-CM

## 2023-11-14 PROCEDURE — 97110 THERAPEUTIC EXERCISES: CPT | Mod: CO

## 2023-11-14 PROCEDURE — 97530 THERAPEUTIC ACTIVITIES: CPT | Mod: CO

## 2023-11-14 NOTE — PROGRESS NOTES
"  [facility]  Occupational Therapy Daily Treatment Note    Date: 11/14/2023  Name: Mitesh Aquino  Clinic Number: [id number]    Therapy Diagnosis:   Encounter Diagnoses   Name Primary?    Rupture of left distal biceps tendon, initial encounter     Decreased range of motion of left elbow     Left elbow pain Yes             Physician: CHRISSY Mccall MD     Physician Orders: Physician Orders: Updated 9/21/2023:"May discontinue the thermoplastic splint.  Instructed to wear the splint in environments where he could fall or sustained an inversion injury but otherwise may discontinue.  Begin regaining terminal elbow and forearm range of motion.  No lifting more than a coke can or small book.  No strengthening at this time.  That starts at around 4 weeks."  (10/19/2023)    Medical Diagnosis:  Z98.890 (ICD-10-CM) - Post-operative state S46.212A (ICD-10-CM) - Rupture of left distal biceps tendon, initial encounter      Date of Injury: 5/19/23  Date of Surgery: 8/9/2023   PROCEDURES PERFORMED:   Left distal biceps open achilles allograft reconstruction      Evaluation Date: 9/6/2023  Authorization Period: 8/21/23 - 8/6/24  Plan of Care Expiration: 11/29/23  Visit #/ Visits Authorized:  6 (+15) / 12  FOTO Completion: Initial eval (9/6/2023)  FOTO #2:  FOTO #3:  FOTO: date and score     Precautions:  Standard, tendon protocol - Updated on 5/20/2023 : cleared to d/c the orthotic except in public. No elbow strengthening per MD for another 4 weeks.     12 weeks post-op as of 11/1/23    Time In: 10:38  am  Time Out: 11:28 am  Total Billable Time: 42 minutes      SUBJECTIVE     History of Current Condition: Mitehs Aquino is a 43 y.o. year old Right hand dominant male who sustained a Left distal biceps rupture at work on 5/19/23 when he slipped from a raftor and grabbed the beam above him and felt a pop. He received MRI imaging on 7/19/23 which revealed "chronic tear distal biceps tendon and stump of " "retracted biceps approximately 8 cm proximal (level of the distal humerus)". He elected to undergo surgery at that time which consisted of allograft distal biceps reconstruction on 8/9/23.     Involved Side: Left  Dominant Side: Right       He was compliant with home exercise program given last session.   Response to previous treatment: Increase in rom  Functional change: he's gradually using the left arm more for daily activities     Pain: 0/10   Location: NA    OBJECTIVE   Objective Measures updated at progress report unless specified.    Mental status: alert, oriented x3     Observation/Appearance:   11.5 cm incision in length along anterior elbow, fully closed, no signs of infection. Thick with limited mobility  Mild swelling about elbow     Sensation: Patient denies numbness/tingling        Edema:  (Measured circumferential, in centimeters)  Hand/Wrist: Right  9/6/2023 Left  9/6/2023 Left  9/19/23   Wrist Crease 16.0 cm 16.3 cm 16.6      Elbow: Right  9/6/2023 Left  9/6/2023 Left  9/19/23   3 Inches distal to elbow crease 25 cm 22.3 cm 23.3   Elbow crease 27.8 cm 29 cm 27.5   3 Inches proximal to elbow crease 27.5 cm 26.5 cm 25.5         ELBOW, WRIST RANGE OF MOTION: Measured in degrees of active motion with goniometer     All shoulder ROM is WNL, Full digit and thumb ROM bilaterally       Right  9/6/23 Left  9/6/23 Left  9/15/2023 AROM post heat and tx Left  9/19/23  PRE TX Left  10/6/2023  PRE TX Left   10/24/2023  PRE TX   Elbow Extension/Flexion -5/150 -50/140  (Neutral forearm, AAROM/PROM) -33/145 -30/145 -8/144 -7/145   Pronation/Supination 80/80 50/65  (Active/passive) 69/80 60/80 72/85 wnl   Wrist Extension/Flexion 70/85 55/55 78/88 65/68 65/76 67/85   Ulnar/Radial Deviation 35/15 10/20  15/20 26/25    Composite Wrist Extension/Flexion NT NT             STRENGTH: Not tested due to precautions  (Measured in pounds using a Dynamometer and pinch meter)    Right   Left   Left Left    10/6/2023 10/6/2023 " 10/24/2023 11/8/2023    Setting 2 100 25 43 49    Average         Key 25  20  20    3 Pt  25  10 15    Tip                     Treatment     Mitesh received the treatments listed below:     Supervised modalities MHP x 8 min to heat up tissues prior to strengthening      Therapeutic exercises to develop flexibility, endurance and strength for 27 minutes, including:  AROM wrist flex/extension (open and close fist) 10+ reps ea (NT)  AROM supination/pro 10 ea (NT)  AROM elbow  with fa in 3 planes 1/10 each way (NT)  Elbow pre's with fa in 2 planes (neutral and sup), 3/10 each way, with 5#  Elbow pre's with fa in pro 3/10 with 5#  Wrist pre's with fa in 3 planes, 3/10 each way, with 5#  FA pre's sup/pro 3/10 with 5#  Air bike level 4, for 3 minutes each way.      Mitesh participated in dynamic functional therapeutic activities to improve functional performance for 15 minutes, including:  BTE Simulator II                   Tool Plane of motion and time                   504   Wrist flex, wrist ext 45 sec ea   302  Rad dev, ulnar dev 45 sec   162  x 60 sec   162 lat pinch,3-jaw pinch 45 sec ea           Patient Education and Home Exercises      Education provided:   - Reviewed HEP  - Progress towards goals     Written Home Exercises Provided: Patient instructed to cont prior HEP.  Exercises were reviewed and Mitesh was able to demonstrate them prior to the end of the session.  Mitesh demonstrated good  understanding of the HEP provided. See EMR under Patient Instructions for exercises provided during therapy sessions.      ASSESSMENT     Mitesh tolerated OT session with no fatigue. Mitesh is a good candidate for physical therapy work conditioning program to prepare him to resume full heavy work duties.   work conditioning Mitesh is progressing well towards his goals and there are no updates to goals at this time. Pt prognosis is Excellent.     Pt will continue to benefit from skilled outpatient occupational  therapy to address the deficits listed in the problem list on initial evaluation, provide pt/family education and to maximize pt's level of independence in the home and community environment.     Pt's spiritual, cultural and educational needs considered and pt agreeable to plan of care and goals.    Anticipated barriers to occupational therapy: none    Goals:  Long Term Goals (to be met by discharge):  Date Goal Met:       1.) Mitesh Aquino will demonstrate significantly improved functional performance from re-assessment as measured by a FOTO Intake score of more than 75.     Goal Status:   In progress     2.) Mitesh Aquino will return to near to prior level of function for ADLs and household management reporting independence or modified independence.     Goal Status:   In progress     3. Mitesh Aquino will report pain 0 out of 10 at worst to increase functional use of LUE for work and leisure tasks.     Goal Status:   In progress      Short Term Goals (to be met by 10/19/23):  Date Goal Met:       Mitesh Aquino will be independent with home exercise program with written instructions.    9/19/23 Goal Status:  Met     Mitesh Aquino will demonstrate -10/140 degrees of elbow ext/flex to improve functional performance in ADLs/work/leisure tasks.     Goal Status:   In progress -(MET)     Mitesh Aquino will demonstrate within 20 lbs of  strength compared to other side to improve functional grasp for ADLs/work/leisure tasks.     Goal Status:   In progress     Mitesh Aquino will demonstrate the ability to complete ADL/IADL tasks with 2/10 pain.      Goal Status:   In progress (MET)         Plan      Re-assess  next session.     Pt to be treated by Occupational Therapy 3 times per week for 12 weeks during the certification period from 9/6/2023 to 11/29/23 to achieve the established goals.     Continue skilled occupational therapy with individualized plan of care focusing on progression along protocol.     Updates/Grading  for next session:  Continue OT, progressing as tolerated per protocol until work conditioning is authorized and scheduled.    DORA Kang/JOCELYNN

## 2023-11-16 ENCOUNTER — CLINICAL SUPPORT (OUTPATIENT)
Dept: REHABILITATION | Facility: HOSPITAL | Age: 43
End: 2023-11-16
Payer: OTHER MISCELLANEOUS

## 2023-11-16 DIAGNOSIS — M25.522 LEFT ELBOW PAIN: Primary | ICD-10-CM

## 2023-11-16 DIAGNOSIS — M25.622 DECREASED RANGE OF MOTION OF LEFT ELBOW: ICD-10-CM

## 2023-11-16 PROCEDURE — 97110 THERAPEUTIC EXERCISES: CPT

## 2023-11-16 PROCEDURE — 97530 THERAPEUTIC ACTIVITIES: CPT

## 2023-11-16 NOTE — PROGRESS NOTES
"  [facility]  Occupational Therapy Daily Treatment Note    Date: 11/16/2023  Name: Mitesh Aquino  Clinic Number: [id number]    Therapy Diagnosis:   Encounter Diagnoses   Name Primary?    Left elbow pain Yes    Decreased range of motion of left elbow        Physician: CHRISSY Mccall MD     Physician Orders: Physician Orders: Updated 9/21/2023:"May discontinue the thermoplastic splint.  Instructed to wear the splint in environments where he could fall or sustained an inversion injury but otherwise may discontinue.  Begin regaining terminal elbow and forearm range of motion.  No lifting more than a coke can or small book.  No strengthening at this time.  That starts at around 4 weeks."  (10/19/2023)    Medical Diagnosis:  Z98.890 (ICD-10-CM) - Post-operative state S46.212A (ICD-10-CM) - Rupture of left distal biceps tendon, initial encounter      Date of Injury: 5/19/23  Date of Surgery: 8/9/2023   PROCEDURES PERFORMED:   Left distal biceps open achilles allograft reconstruction      Evaluation Date: 9/6/2023  Authorization Period: 8/21/23 - 8/6/24  Plan of Care Expiration: 11/29/23  Visit #/ Visits Authorized:  6 (+15) / 12  FOTO Completion: Initial eval   61   (9/6/2023)  FOTO #2:  69  FOTO #3: 80   11/16/23  FOTO: date and score     Precautions:  Standard, tendon protocol - Updated on 5/20/2023 : cleared to d/c the orthotic except in public. No elbow strengthening per MD for another 4 weeks.       Time In: 3:30  pm  Time Out: 4:20 pm  Total Billable Time: 50 minutes      SUBJECTIVE     History of Current Condition: Mitesh Aquino is a 43 y.o. year old Right hand dominant male who sustained a Left distal biceps rupture at work on 5/19/23 when he slipped from a raftor and grabbed the beam above him and felt a pop. 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 He " elected to undergo surgery at that time which consisted of allograft distal biceps reconstruction on 8/9/23.     Involved Side: Left  Dominant Side: Right       He was compliant with home exercise program given last session.   Response to previous treatment: Increase in rom  Functional change: he's gradually using the left arm more for daily activities     Pain: 0/10   Location: NA    OBJECTIVE   Objective Measures updated at progress report unless specified.    Mental status: alert, oriented x3     Observation/Appearance:   11.5 cm incision in length along anterior elbow, fully closed, no signs of infection. Thick with limited mobility  Mild swelling about elbow     Sensation: Patient denies numbness/tingling        Edema:  (Measured circumferential, in centimeters)  Hand/Wrist: Right  9/6/2023 Left  9/6/2023 Left  9/19/23   Wrist Crease 16.0 cm 16.3 cm 16.6      Elbow: Right  9/6/2023 Left  9/6/2023 Left  9/19/23   3 Inches distal to elbow crease 25 cm 22.3 cm 23.3   Elbow crease 27.8 cm 29 cm 27.5   3 Inches proximal to elbow crease 27.5 cm 26.5 cm 25.5         ELBOW, WRIST RANGE OF MOTION: Measured in degrees of active motion with goniometer     All shoulder ROM is WNL, Full digit and thumb ROM bilaterally       Right  9/6/23 Left  9/6/23 Left  9/15/2023 AROM post heat and tx Left  9/19/23  PRE TX Left  10/6/2023  PRE TX Left   10/24/2023  PRE TX   Elbow Extension/Flexion -5/150 -50/140  (Neutral forearm, AAROM/PROM) -33/145 -30/145 -8/144 -7/145   Pronation/Supination 80/80 50/65  (Active/passive) 69/80 60/80 72/85 wnl   Wrist Extension/Flexion 70/85 55/55 78/88 65/68 65/76 67/85   Ulnar/Radial Deviation 35/15 10/20  15/20 26/25    Composite Wrist Extension/Flexion NT NT             STRENGTH: Not tested due to precautions  (Measured in pounds using a Dynamometer and pinch meter)    Right   Left   Left Left    10/6/2023 10/6/2023 10/24/2023 11/8/2023    Setting 2 100 25 43 49    Average         Key 25   20  20    3 Pt  25  10 15    Tip                     Treatment     Mitesh received the treatments listed below:     Supervised modalities MHP x 0 min to heat up tissues prior to strengthening  (NT)      Therapeutic exercises to develop flexibility, endurance and strength for 20 minutes, including:  Elbow pre's with fa in 2 planes (neutral and sup), 3/10 each way, with 5#  Elbow pre's with fa in pro 3/10 with 5#  Wrist pre's with fa in 3 planes, 3/10 each way, with 5#  FA pre's sup/pro 3/10 with 5#  Air bike level 4, for 4 minutes fw, 4 mins bw      Mitesh participated in dynamic functional therapeutic activities to improve functional performance for 30 minutes, including:  BTE Simulator II                   Tool Plane of motion and time                   504   Wrist flex, wrist ext 45 sec ea   302  Rad dev, ulnar dev 45 sec   162  x 60 sec   162 lat pinch,3-jaw pinch 45 sec ea           Patient Education and Home Exercises      Education provided:   - Reviewed HEP  - Progress towards goals     Written Home Exercises Provided: Patient instructed to cont prior HEP.  Exercises were reviewed and Mitesh was able to demonstrate them prior to the end of the session.  Mitesh demonstrated good  understanding of the HEP provided. See EMR under Patient Instructions for exercises provided during therapy sessions.      ASSESSMENT     Mitesh tolerated OT session with no fatigue. Mitesh is a good candidate for physical therapy work conditioning program to prepare him to resume full heavy work duties. Upgraded BTE exercises and he tolerated well. Increased FOTO score today to 80.    work conditioning Mitesh is progressing well towards his goals and there are no updates to goals at this time. Pt prognosis is Excellent.     Pt will continue to benefit from skilled outpatient occupational therapy to address the deficits listed in the problem list on initial evaluation, provide pt/family education and to maximize pt's level of  independence in the home and community environment.     Pt's spiritual, cultural and educational needs considered and pt agreeable to plan of care and goals.    Anticipated barriers to occupational therapy: none    Goals:  Long Term Goals (to be met by discharge):  Date Goal Met:       1.) Mitesh Aquino will demonstrate significantly improved functional performance from re-assessment as measured by a FOTO Intake score of more than 75.     Goal Status:   In progress     2.) Mitesh Aqiuno will return to near to prior level of function for ADLs and household management reporting independence or modified independence.     Goal Status:   In progress     3. Mitesh Aquino will report pain 0 out of 10 at worst to increase functional use of LUE for work and leisure tasks.     Goal Status:   In progress      Short Term Goals (to be met by 10/19/23):  Date Goal Met:       Mitesh Aquino will be independent with home exercise program with written instructions.    9/19/23 Goal Status:  Met     Mitesh Aquino will demonstrate -10/140 degrees of elbow ext/flex to improve functional performance in ADLs/work/leisure tasks.     Goal Status:   In progress -(MET)     Mitesh Aquino will demonstrate within 20 lbs of  strength compared to other side to improve functional grasp for ADLs/work/leisure tasks.     Goal Status:   In progress     Mitesh Aquino will demonstrate the ability to complete ADL/IADL tasks with 2/10 pain.      Goal Status:   In progress (MET)         Plan      Re-assess  next session.     Pt to be treated by Occupational Therapy 3 times per week for 12 weeks during the certification period from 9/6/2023 to 11/29/23 to achieve the established goals.     Continue skilled occupational therapy with individualized plan of care focusing on progression along protocol.     Updates/Grading for next session:  Continue OT, progressing as tolerated per protocol until work conditioning is authorized and scheduled.    Sima  Rasheeda, OT

## 2023-11-20 ENCOUNTER — CLINICAL SUPPORT (OUTPATIENT)
Dept: REHABILITATION | Facility: HOSPITAL | Age: 43
End: 2023-11-20
Payer: OTHER MISCELLANEOUS

## 2023-11-20 DIAGNOSIS — M25.622 DECREASED RANGE OF MOTION OF LEFT ELBOW: ICD-10-CM

## 2023-11-20 DIAGNOSIS — M25.522 LEFT ELBOW PAIN: Primary | ICD-10-CM

## 2023-11-20 PROCEDURE — 97530 THERAPEUTIC ACTIVITIES: CPT

## 2023-11-20 PROCEDURE — 97110 THERAPEUTIC EXERCISES: CPT

## 2023-11-20 NOTE — PROGRESS NOTES
"  [facility]  Occupational Therapy Daily Treatment Note    Date: 11/20/2023  Name: Mitesh Aquino  Clinic Number: [id number]    Therapy Diagnosis:   Encounter Diagnoses   Name Primary?    Left elbow pain Yes    Decreased range of motion of left elbow          Physician: CHRISSY Mccall MD     Physician Orders: Physician Orders: Updated 9/21/2023:"May discontinue the thermoplastic splint.  Instructed to wear the splint in environments where he could fall or sustained an inversion injury but otherwise may discontinue.  Begin regaining terminal elbow and forearm range of motion.  No lifting more than a coke can or small book.  No strengthening at this time.  That starts at around 4 weeks."  (10/19/2023)    Medical Diagnosis:  Z98.890 (ICD-10-CM) - Post-operative state S46.212A (ICD-10-CM) - Rupture of left distal biceps tendon, initial encounter      Date of Injury: 5/19/23  Date of Surgery: 8/9/2023   PROCEDURES PERFORMED:   Left distal biceps open achilles allograft reconstruction      Evaluation Date: 9/6/2023  Authorization Period: 8/21/23 - 8/6/24  Plan of Care Expiration: 11/29/23  Visit #/ Visits Authorized:  6 (+15) / 12  FOTO Completion: Initial eval   61   (9/6/2023)  FOTO #2:  69  FOTO #3: 80   11/16/23  FOTO: date and score     Precautions:  Standard, tendon protocol - Updated on 5/20/2023 : cleared to d/c the orthotic except in public. No elbow strengthening per MD for another 4 weeks.       Time In:10:00  am  Time Out: 11:00 am  Total Billable Time: 60 minutes      SUBJECTIVE     History of Current Condition: Mitesh Aquino is a 43 y.o. year old Right hand dominant male who sustained a Left distal biceps rupture at work on 5/19/23 when he slipped from a raftor and grabbed the beam above him and felt a pop. 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 He " elected to undergo surgery at that time which consisted of allograft distal biceps reconstruction on 8/9/23.     Involved Side: Left  Dominant Side: Right       He was compliant with home exercise program given last session.   Response to previous treatment: reports that he has soreness after therapy but that he otherwise is pain-free.  Functional change: he's gradually using the left arm more for daily activities     Pain: 0/10   Location: NA    OBJECTIVE   Objective Measures updated at progress report unless specified.    Mental status: alert, oriented x3     Observation/Appearance:   Sx scar along anterior elbow. Otherwise. No other abnormalities noted.   Sensation: Patient denies numbness/tingling        Edema:  (Measured circumferential, in centimeters)  Hand/Wrist: Right  9/6/2023 Left  9/6/2023 Left  9/19/23   Wrist Crease 16.0 cm 16.3 cm 16.6      Elbow: Right  9/6/2023 Left  9/6/2023 Left  9/19/23   3 Inches distal to elbow crease 25 cm 22.3 cm 23.3   Elbow crease 27.8 cm 29 cm 27.5   3 Inches proximal to elbow crease 27.5 cm 26.5 cm 25.5         ELBOW, WRIST RANGE OF MOTION: Measured in degrees of active motion with goniometer     All shoulder ROM is WNL, Full digit and thumb ROM bilaterally       Right  9/6/23 Left  9/6/23 Left  9/15/2023 AROM post heat and tx Left  9/19/23  PRE TX Left  10/6/2023  PRE TX Left   10/24/2023  PRE TX   Elbow Extension/Flexion -5/150 -50/140  (Neutral forearm, AAROM/PROM) -33/145 -30/145 -8/144 -7/145   Pronation/Supination 80/80 50/65  (Active/passive) 69/80 60/80 72/85 wnl   Wrist Extension/Flexion 70/85 55/55 78/88 65/68 65/76 67/85   Ulnar/Radial Deviation 35/15 10/20  15/20 26/25    Composite Wrist Extension/Flexion NT NT             STRENGTH: Not tested due to precautions  (Measured in pounds using a Dynamometer and pinch meter)    Right   Left   Left Left    10/6/2023 10/6/2023 10/24/2023 11/8/2023    Setting 2 100 25 43 49    Average         Key 25  20  20     3 Pt  25  10 15    Tip                     Treatment     Mitesh received the treatments listed below:     Supervised modalities MHP x 0 min to heat up tissues prior to strengthening  (NT)      Therapeutic exercises to develop flexibility, endurance and strength for 25 minutes, including:  Elbow pre's with fa in 3 planes, 3/10 each way, with 5#  Hammer swings x 20 reps  Wrist pre's with fa in 3 planes, 3/10 each way, with 5#  FA pre's sup/pro 3/10 with 5# (NT)  Air bike leve l 4, for 4 minutes fw, 4 mins bw      Mitesh participated in dynamic functional therapeutic activities to improve functional performance for 3minutes, including:    Green Therabar Twisting: fa sup, far pro, wrist flex, wrist extension, wrist rd, wrist ud. 20 reps ea  10# dumbbell carry x 5 min  5.5# with elbow flexed at 90 degrees x 5 min    BTE Simulator II                   Tool Plane of motion and time                   504   Wrist flex, wrist ext 45 sec ea   302  Rad dev, ulnar dev 45 sec   162  x 60 sec   162 lat pinch,3-jaw pinch 45 sec ea           Patient Education and Home Exercises      Education provided:   - Reviewed HEP  - Progress towards goals     Written Home Exercises Provided: Patient instructed to cont prior HEP.  Exercises were reviewed and Mitesh was able to demonstrate them prior to the end of the session.  Mitesh demonstrated good  understanding of the HEP provided. See EMR under Patient Instructions for exercises provided during therapy sessions.      ASSESSMENT   Further advanced strengthening today and Mitesh tolerated it without pain.Mitesh is progressing towards his goals however, his work duties require heavy lifting.   He is only curling 5 lbs which he finds a challenge. Mitesh is a good candidate for physical therapy work conditioning program to prepare him to resume full heavy work duties. We are awaiting approval for this from [employer].    Pt will continue to benefit from skilled outpatient occupational  "therapy to address the deficits listed in the problem list on initial evaluation, provide pt/family education and to maximize pt's level of independence in the home and community environment.     Pt's spiritual, cultural and educational needs considered and pt agreeable to plan of care and goals.    Anticipated barriers to occupational therapy: none    Goals:  Long Term Goals (to be met by discharge):  Date Goal Met:       1.) Mitesh Aquino will demonstrate significantly improved functional performance from re-assessment as measured by a FOTO Intake score of more than 75.      Goal Status:   (MET and now looking for a score of 95" for return to work.     2.) Mitesh Aquino will return to near to prior level of function for ADLs and household management reporting independence or modified independence.     Goal Status:   In progress     3. Mitesh Aquino will report pain 0 out of 10 at worst to increase functional use of LUE for work and leisure tasks.     Goal Status:   In progress      Short Term Goals (to be met by 10/19/23):  Date Goal Met:       Mitesh Aquino will be independent with home exercise program with written instructions.    9/19/23 Goal Status:  Met     Mitesh Aquino will demonstrate -10/140 degrees of elbow ext/flex to improve functional performance in ADLs/work/leisure tasks.     Goal Status:   In progress -(MET)     Mitesh Aquino will demonstrate within 20 lbs of  strength compared to other side to improve functional grasp for ADLs/work/leisure tasks.     Goal Status:   In progress     Mitesh Aquino will demonstrate the ability to complete ADL/IADL tasks with 2/10 pain.      Goal Status:   In progress (Not yet functioning in heavy lefting requirement for return to work.         Plan      Awaiting approval to transition to work conditioning.  Pt to be treated by Occupational Therapy 3 times per week for 12 weeks during the certification period from 9/6/2023 to 11/29/23 to achieve the established goals. "     Continue skilled occupational therapy with individualized plan of care focusing on progression along protocol.     Updates/Grading for next session:  Continue OT, progressing as tolerated per protocol until work conditioning is authorized and scheduled.    Michelle Alfred, OT

## 2023-11-22 ENCOUNTER — CLINICAL SUPPORT (OUTPATIENT)
Dept: REHABILITATION | Facility: HOSPITAL | Age: 43
End: 2023-11-22
Payer: OTHER MISCELLANEOUS

## 2023-11-22 DIAGNOSIS — M25.522 LEFT ELBOW PAIN: Primary | ICD-10-CM

## 2023-11-22 DIAGNOSIS — M25.622 DECREASED RANGE OF MOTION OF LEFT ELBOW: ICD-10-CM

## 2023-11-22 PROCEDURE — 97110 THERAPEUTIC EXERCISES: CPT

## 2023-11-22 PROCEDURE — 97530 THERAPEUTIC ACTIVITIES: CPT

## 2023-11-22 NOTE — PROGRESS NOTES
"  [facility]  Occupational Therapy Daily Treatment Note    Date: 11/22/2023  Name: Mitesh Aquino  Clinic Number: [id number]    Therapy Diagnosis:   No diagnosis found.        Physician: CHRISSY Mccall MD     Physician Orders: Physician Orders: Updated 9/21/2023:"May discontinue the thermoplastic splint.  Instructed to wear the splint in environments where he could fall or sustained an inversion injury but otherwise may discontinue.  Begin regaining terminal elbow and forearm range of motion.  No lifting more than a coke can or small book.  No strengthening at this time.  That starts at around 4 weeks."  (10/19/2023)    Medical Diagnosis:  Z98.890 (ICD-10-CM) - Post-operative state S46.212A (ICD-10-CM) - Rupture of left distal biceps tendon, initial encounter      Date of Injury: 5/19/23  Date of Surgery: 8/9/2023   PROCEDURES PERFORMED:   Left distal biceps open achilles allograft reconstruction      Evaluation Date: 9/6/2023  Authorization Period: 8/21/23 - 8/6/24  Plan of Care Expiration: 11/29/23  Visit #/ Visits Authorized:  6 (+15) / 12  FOTO Completion: Initial eval   61   (9/6/2023)  FOTO #2:  69  FOTO #3: 80   11/16/23  FOTO: date and score     Precautions:  Standard, tendon protocol - Updated on 5/20/2023 : cleared to d/c the orthotic except in public. No elbow strengthening per MD for another 4 weeks.       Time In:10:00  am  Time Out: 11:00 am  Total Billable Time: 60 minutes      SUBJECTIVE     History of Current Condition: Mitesh Aquino is a 43 y.o. year old Right hand dominant male who sustained a Left distal biceps rupture at work on 5/19/23 when he slipped from a raftor and grabbed the beam above him and felt a pop. 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 He elected to undergo surgery at that time which consisted of allograft distal biceps " reconstruction on 8/9/23.     Involved Side: Left  Dominant Side: Right     Mitesh reports that he generally does not have pain generally. Reports some soreness in the evenings after attending therapy.    Response to previous treatment: reports that he has soreness after therapy but that he otherwise is pain-free.  Functional change: he's gradually using the left arm more for daily activities     Pain: 0/10   Location: NA    OBJECTIVE   Objective Measures updated at progress report unless specified.    Mental status: alert, oriented x3     Observation/Appearance:   Sx scar along anterior elbow. Otherwise. No other abnormalities noted.   Sensation: Patient denies numbness/tingling        Edema:  (Measured circumferential, in centimeters)  Hand/Wrist: Right  9/6/2023 Left  9/6/2023 Left  9/19/23   Wrist Crease 16.0 cm 16.3 cm 16.6      Elbow: Right  9/6/2023 Left  9/6/2023 Left  9/19/23   3 Inches distal to elbow crease 25 cm 22.3 cm 23.3   Elbow crease 27.8 cm 29 cm 27.5   3 Inches proximal to elbow crease 27.5 cm 26.5 cm 25.5         ELBOW, WRIST RANGE OF MOTION: Measured in degrees of active motion with goniometer     All shoulder ROM is WNL, Full digit and thumb ROM bilaterally       Right  9/6/23 Left  9/6/23 Left  9/15/2023 AROM post heat and tx Left  9/19/23  PRE TX Left  10/6/2023  PRE TX Left   10/24/2023  PRE TX   Elbow Extension/Flexion -5/150 -50/140  (Neutral forearm, AAROM/PROM) -33/145 -30/145 -8/144 -7/145   Pronation/Supination 80/80 50/65  (Active/passive) 69/80 60/80 72/85 wnl   Wrist Extension/Flexion 70/85 55/55 78/88 65/68 65/76 67/85   Ulnar/Radial Deviation 35/15 10/20  15/20 26/25    Composite Wrist Extension/Flexion NT NT             STRENGTH: Not tested due to precautions  (Measured in pounds using a Dynamometer and pinch meter)    Right   Left   Left Left Left    10/6/2023 10/6/2023 10/24/2023 11/8/2023 11/22/2023    Setting 2 100 25 43 49 58/65/60 Avg 61 lbs    Average          [place]  25  20  20     3 Pt  25  10 15     Tip                      Treatment     Mitesh received the treatments listed below:     Therapeutic exercises to develop flexibility, endurance and strength for 30minutes, including:  Reassessment of  strength   Elbow pre's with fa in 3 planes, 3/10 each way, with 5#  Hammer swings x 20 reps with 1 lb cuff weight attached  Wrist pre's with fa in 3 planes, 3/10 each way, with 5#  FA pre's sup/pro 3/10 with 5# (NT)  Air bike leve l 4, for 4 minutes fw, 4 mins bw      Mitesh participated in dynamic functional therapeutic activities to improve functional performance for 25 minutes, including:    Green Therabar Twisting: fa sup, far pro, wrist flex, wrist extension, wrist rd, wrist ud. 20 reps ea  10# dumbbell carry x 5 min  5.5# with elbow flexed at 90 degrees x 5 min    BTE Simulator II                   Tool Plane of motion and time                   504   Wrist flex, wrist ext 45 sec ea   302  Rad dev, ulnar dev 45 sec   162  x 60 sec   162 lat pinch,3-jaw pinch 45 sec ea           Patient Education and Home Exercises      Education provided:   - Reviewed HEP  - Progress towards goals     Written Home Exercises Provided: Patient instructed to cont prior HEP.  Exercises were reviewed and Mitesh was able to demonstrate them prior to the end of the session.  Mitesh demonstrated good  understanding of the HEP provided. See EMR under Patient Instructions for exercises provided during therapy sessions.      ASSESSMENT   Upon formal reassessment  good increase noted in  strength though significant deficit remains. Mitesh is progressing towards his goals however, his work duties require heavy lifting. He is only curling 5 lbs which he finds a challenge. Mitesh is a good candidate for physical therapy work conditioning program to prepare him to resume full heavy work duties. We are awaiting approval for this from workers comp.    Pt will continue to benefit from skilled  "outpatient occupational therapy to address the deficits listed in the problem list on initial evaluation, provide pt/family education and to maximize pt's level of independence in the home and community environment.     Pt's spiritual, cultural and educational needs considered and pt agreeable to plan of care and goals.    Anticipated barriers to occupational therapy: none    Goals:  Long Term Goals (to be met by discharge):  Date Goal Met:       1.) Mitesh Aquino will demonstrate significantly improved functional performance from re-assessment as measured by a FOTO Intake score of more than 75.      Goal Status:   (MET and now looking for a score of 95" for return to work.     2.) Mitesh Aquino will return to near to prior level of function for ADLs and household management reporting independence or modified independence.     Goal Status:   In progress     3. Mitesh Aquino will report pain 0 out of 10 at worst to increase functional use of LUE for work and leisure tasks.     Goal Status:   In progress      Short Term Goals (to be met by 10/19/23):  Date Goal Met:       Mitesh Aquino will be independent with home exercise program with written instructions.    9/19/23 Goal Status:  Met     Mitesh Aquino will demonstrate -10/140 degrees of elbow ext/flex to improve functional performance in ADLs/work/leisure tasks.     Goal Status:   In progress -(MET)     Mitesh Aquino will demonstrate within 20 lbs of  strength compared to other side to improve functional grasp for ADLs/work/leisure tasks.     Goal Status:   In progress     Mitesh Aquino will demonstrate the ability to complete ADL/IADL tasks with 2/10 pain.      Goal Status:   In progress (Not yet functioning in heavy lefting requirement for return to work.         Plan      Awaiting approval to transition to work conditioning.  Pt to be treated by Occupational Therapy 3 times per week for 12 weeks during the certification period from 9/6/2023 to 11/29/23 to achieve " the established goals.     Continue skilled occupational therapy with individualized plan of care focusing on progression along protocol.     Updates/Grading for next session:  Continue OT, progressing as tolerated per protocol until work conditioning is authorized and scheduled.    Michelle Alfred, OT

## 2023-11-28 ENCOUNTER — CLINICAL SUPPORT (OUTPATIENT)
Dept: REHABILITATION | Facility: HOSPITAL | Age: 43
End: 2023-11-28
Payer: OTHER MISCELLANEOUS

## 2023-11-28 DIAGNOSIS — M25.622 DECREASED RANGE OF MOTION OF LEFT ELBOW: ICD-10-CM

## 2023-11-28 DIAGNOSIS — M25.522 LEFT ELBOW PAIN: Primary | ICD-10-CM

## 2023-11-28 PROCEDURE — 97110 THERAPEUTIC EXERCISES: CPT

## 2023-11-28 PROCEDURE — 97530 THERAPEUTIC ACTIVITIES: CPT

## 2023-11-28 NOTE — PROGRESS NOTES
"  [facility]  Occupational Therapy Daily Treatment Note    Date: 11/28/2023  Name: Mitesh Aquino  Clinic Number: [id number]    Therapy Diagnosis:   Encounter Diagnoses   Name Primary?    Left elbow pain Yes    Decreased range of motion of left elbow            Physician: CHRISSY Mccall MD     Physician Orders: Physician Orders: Updated 9/21/2023:"May discontinue the thermoplastic splint.  Instructed to wear the splint in environments where he could fall or sustained an inversion injury but otherwise may discontinue.  Begin regaining terminal elbow and forearm range of motion.  No lifting more than a coke can or small book.  No strengthening at this time.  That starts at around 4 weeks."  (10/19/2023)    Medical Diagnosis:  Z98.890 (ICD-10-CM) - Post-operative state S46.212A (ICD-10-CM) - Rupture of left distal biceps tendon, initial encounter      Date of Injury: 5/19/23  Date of Surgery: 8/9/2023   PROCEDURES PERFORMED:   Left distal biceps open achilles allograft reconstruction      Evaluation Date: 9/6/2023  Authorization Period: 8/21/23 - 8/6/24  Plan of Care Expiration: 11/29/23  Visit #/ Visits Authorized:  6 (+15) / 12  FOTO Completion: Initial eval   61   (9/6/2023)  FOTO #2:  69  FOTO #3: 80   11/16/23  FOTO: date and score     Precautions:  Standard, tendon protocol - Updated on 5/20/2023 : cleared to d/c the orthotic except in public. No elbow strengthening per MD for another 4 weeks.       Time In:10:00  am  Time Out: 10:55 am  Total Billable Time: 55 minutes      SUBJECTIVE     History of Current Condition: Mitesh Aquino is a 43 y.o. year old Right hand dominant male who sustained a Left distal biceps rupture at work on 5/19/23 when he slipped from a raftor and grabbed the beam above him and felt a pop. 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 " He elected to undergo surgery at that time which consisted of allograft distal biceps reconstruction on 8/9/23.     Involved Side: Left  Dominant Side: Right     Mitesh reports that he felt fine after upgrading the BTE resistance last session     Response to previous treatment: reports that he has soreness after therapy but that he otherwise is pain-free.  Functional change: he's gradually using the left arm more for daily activities     Pain: 0/10   Location: NA    OBJECTIVE   Objective Measures updated at progress report unless specified.    Mental status: alert, oriented x3     Observation/Appearance:   Sx scar along anterior elbow. Otherwise. No other abnormalities noted.   Sensation: Patient denies numbness/tingling        Edema:  (Measured circumferential, in centimeters)  Hand/Wrist: Right  9/6/2023 Left  9/6/2023 Left  9/19/23   Wrist Crease 16.0 cm 16.3 cm 16.6      Elbow: Right  9/6/2023 Left  9/6/2023 Left  9/19/23   3 Inches distal to elbow crease 25 cm 22.3 cm 23.3   Elbow crease 27.8 cm 29 cm 27.5   3 Inches proximal to elbow crease 27.5 cm 26.5 cm 25.5         ELBOW, WRIST RANGE OF MOTION: Measured in degrees of active motion with goniometer     All shoulder ROM is WNL, Full digit and thumb ROM bilaterally       Right  9/6/23 Left  9/6/23 Left  9/15/2023 AROM post heat and tx Left  9/19/23  PRE TX Left  10/6/2023  PRE TX Left   10/24/2023  PRE TX   Elbow Extension/Flexion -5/150 -50/140  (Neutral forearm, AAROM/PROM) -33/145 -30/145 -8/144 -7/145   Pronation/Supination 80/80 50/65  (Active/passive) 69/80 60/80 72/85 wnl   Wrist Extension/Flexion 70/85 55/55 78/88 65/68 65/76 67/85   Ulnar/Radial Deviation 35/15 10/20  15/20 26/25    Composite Wrist Extension/Flexion NT NT             STRENGTH: Not tested due to precautions  (Measured in pounds using a Dynamometer and pinch meter)    Right   Left   Left Left Left    10/6/2023 10/6/2023 10/24/2023 11/8/2023 11/22/2023    Setting 2 100 25 43 49  58/65/60 Avg 61 lbs    Average          Key 25  20  20     3 Pt  25  10 15     Tip                      Treatment     Mitesh received the treatments listed below:     Therapeutic exercises to develop flexibility, endurance and strength for 30minutes, including:  Reassessment of  strength   Elbow pre's with fa in 3 planes, 3/10 each way, with 5#  Hammer swings x 20 reps with 1 lb cuff weight attached  Wrist pre's with fa in 3 planes, 3/10 each way, with 5#  FA pre's sup/pro 3/10 with 5# (NT)  Air bike leve l 4, for 4 minutes fw, 4 mins bw      Mitesh participated in dynamic functional therapeutic activities to improve functional performance for 25 minutes, including:    Green Therabar Twisting: fa sup, far pro, wrist flex, wrist extension, wrist rd, wrist ud. 20 reps ea  10# dumbbell carry x 5 min  5.5# with elbow flexed at 90 degrees x 5 min    BTE Simulator II                   Tool Plane of motion and time                   504   Wrist flex, wrist ext 45 sec ea   302  Rad dev, ulnar dev 45 sec   162  x 60 sec   162 lat pinch,3-jaw pinch 45 sec ea           Patient Education and Home Exercises      Education provided:   - Reviewed HEP  - Progress towards goals     Written Home Exercises Provided: Patient instructed to cont prior HEP.  Exercises were reviewed and Mitesh was able to demonstrate them prior to the end of the session.  Mitesh demonstrated good  understanding of the HEP provided. See EMR under Patient Instructions for exercises provided during therapy sessions.      ASSESSMENT     Mitesh tolerated OT session well without pain. Mitesh is a good candidate for physical therapy work conditioning program to prepare him to resume full heavy work duties. We are awaiting approval for this from workers comp.    Pt will continue to benefit from skilled outpatient occupational therapy to address the deficits listed in the problem list on initial evaluation, provide pt/family education and to maximize  "pt's level of independence in the home and community environment.     Pt's spiritual, cultural and educational needs considered and pt agreeable to plan of care and goals.    Anticipated barriers to occupational therapy: none    Goals:  Long Term Goals (to be met by discharge):  Date Goal Met:       1.) Mitesh Aquino will demonstrate significantly improved functional performance from re-assessment as measured by a FOTO Intake score of more than 75.      Goal Status:   (MET and now looking for a score of 95" for return to work.     2.) Mitesh Aquino will return to near to prior level of function for ADLs and household management reporting independence or modified independence.     Goal Status:   In progress     3. Mitesh Aquino will report pain 0 out of 10 at worst to increase functional use of LUE for work and leisure tasks.     Goal Status:   In progress      Short Term Goals (to be met by 10/19/23):  Date Goal Met:       Mitesh Aquino will be independent with home exercise program with written instructions.    9/19/23 Goal Status:  Met     Mitesh Aquino will demonstrate -10/140 degrees of elbow ext/flex to improve functional performance in ADLs/work/leisure tasks.     Goal Status:   In progress -(MET)     Mitesh Aquino will demonstrate within 20 lbs of  strength compared to other side to improve functional grasp for ADLs/work/leisure tasks.     Goal Status:   In progress     Mitesh Aquino will demonstrate the ability to complete ADL/IADL tasks with 2/10 pain.      Goal Status:   In progress (Not yet functioning in heavy lefting requirement for return to work.         Plan      Awaiting approval to transition to work conditioning.  Pt to be treated by Occupational Therapy 3 times per week for 12 weeks during the certification period from 9/6/2023 to 11/29/23 to achieve the established goals.     Continue skilled occupational therapy with individualized plan of care focusing on progression along protocol. "     Updates/Grading for next session:  Continue OT, progressing as tolerated per protocol until work conditioning is authorized and scheduled.    Sima Vanessa, OT

## 2023-11-30 ENCOUNTER — CLINICAL SUPPORT (OUTPATIENT)
Dept: REHABILITATION | Facility: HOSPITAL | Age: 43
End: 2023-11-30
Payer: OTHER MISCELLANEOUS

## 2023-11-30 ENCOUNTER — PATIENT MESSAGE (OUTPATIENT)
Dept: REHABILITATION | Facility: HOSPITAL | Age: 43
End: 2023-11-30
Payer: COMMERCIAL

## 2023-11-30 DIAGNOSIS — M25.622 DECREASED RANGE OF MOTION OF LEFT ELBOW: ICD-10-CM

## 2023-11-30 DIAGNOSIS — M25.522 LEFT ELBOW PAIN: Primary | ICD-10-CM

## 2023-11-30 PROCEDURE — 97110 THERAPEUTIC EXERCISES: CPT

## 2023-11-30 PROCEDURE — 97530 THERAPEUTIC ACTIVITIES: CPT

## 2023-11-30 NOTE — PROGRESS NOTES
"    [facility]  Occupational Therapy Daily Treatment Note    Date: 11/30/2023  Name: Mitesh Aquino  Clinic Number: [id number]    Therapy Diagnosis:   Encounter Diagnoses   Name Primary?    Left elbow pain Yes    Decreased range of motion of left elbow              Physician: CHRISSY Mccall MD     Physician Orders: Physician Orders: Updated 9/21/2023:"May discontinue the thermoplastic splint.  Instructed to wear the splint in environments where he could fall or sustained an inversion injury but otherwise may discontinue.  Begin regaining terminal elbow and forearm range of motion.  No lifting more than a coke can or small book.  No strengthening at this time.  That starts at around 4 weeks."  (10/19/2023)    Medical Diagnosis:  Z98.890 (ICD-10-CM) - Post-operative state S46.212A (ICD-10-CM) - Rupture of left distal biceps tendon, initial encounter      Date of Injury: 5/19/23  Date of Surgery: 8/9/2023   PROCEDURES PERFORMED:   Left distal biceps open achilles allograft reconstruction      Evaluation Date: 9/6/2023  Authorization Period: 8/21/23 - 8/6/24  Plan of Care Expiration: 11/29/23  Visit #/ Visits Authorized:  6 (+15) / 12  FOTO Completion: Initial eval   61   (9/6/2023)  FOTO #2:  69  FOTO #3: 80   11/16/23    Precautions:  Standard, tendon protocol - Updated on 5/20/2023 : cleared to d/c the orthotic except in public. No elbow strengthening per MD for another 4 weeks.       Time In:10:00  am  Time Out: 11:00  am  Total Billable Time: 55 minutes      SUBJECTIVE     History of Current Condition: Mitesh Aquino is a 43 y.o. year old Right hand dominant male who sustained a Left distal biceps rupture at work on 5/19/23 when he slipped from a raftor and grabbed the beam above him and felt a pop. 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 He elected to " undergo surgery at that time which consisted of allograft distal biceps reconstruction on 8/9/23.     Involved Side: Left  Dominant Side: Right     Mitesh reports that he felt fine after upgrading the BTE resistance last session     Response to previous treatment: reports that he has soreness after therapy but that he otherwise is pain-free.  Functional change: he's gradually using the left arm more for daily activities     Pain: 0/10   Location: NA    OBJECTIVE   Objective Measures updated at progress report unless specified.    Mental status: alert, oriented x3     Observation/Appearance:   Sx scar along anterior elbow. Otherwise. No other abnormalities noted.   Sensation: Patient denies numbness/tingling        Edema:  (Measured circumferential, in centimeters)  Hand/Wrist: Right  9/6/2023 Left  9/6/2023 Left  9/19/23   Wrist Crease 16.0 cm 16.3 cm 16.6      Elbow: Right  9/6/2023 Left  9/6/2023 Left  9/19/23   3 Inches distal to elbow crease 25 cm 22.3 cm 23.3   Elbow crease 27.8 cm 29 cm 27.5   3 Inches proximal to elbow crease 27.5 cm 26.5 cm 25.5         ELBOW, WRIST RANGE OF MOTION: Measured in degrees of active motion with goniometer     All shoulder ROM is WNL, Full digit and thumb ROM bilaterally       Right  9/6/23 Left  9/6/23 Left  9/15/2023 AROM post heat and tx Left  9/19/23  PRE TX Left  10/6/2023  PRE TX Left   10/24/2023  PRE TX   Elbow Extension/Flexion -5/150 -50/140  (Neutral forearm, AAROM/PROM) -33/145 -30/145 -8/144 -7/145   Pronation/Supination 80/80 50/65  (Active/passive) 69/80 60/80 72/85 wnl   Wrist Extension/Flexion 70/85 55/55 78/88 65/68 65/76 67/85   Ulnar/Radial Deviation 35/15 10/20  15/20 26/25    Composite Wrist Extension/Flexion NT NT             STRENGTH: Not tested due to precautions  (Measured in pounds using a Dynamometer and pinch meter)    Right   Left   Left Left Left Left    10/6/2023 10/6/2023 10/24/2023 11/8/2023 11/22/2023 11/30/2023    Setting 2 100 25 43 49  58/65/60 Avg 61 lbs 59, 63, 64 Avg 62 lbs    Average           Key 25  20  20   20   3 Pt  25  10 15   17   Tip        Right     10/6/2023   100       25     25                      Treatment     Mitesh received the treatments listed below:     Therapeutic exercises to develop flexibility, endurance and strength for 35 minutes, including:  Reassessment of  and pinch strengths   Elbow pre's with fa in 3 planes, 3/10 each way, with 5#  Hammer swings x 20 reps with 1 lb cuff weight attached (NT)  Wrist pre's with fa in 3 planes, 3/10 each way, with 5#  FA pre's sup/pro 3/10 with 5#   Air bike leve l 4, for 4 minutes fw, 4 mins bw      Mitesh participated in dynamic functional therapeutic activities to improve functional performance for 20 minutes, including:    Green Therabar Twisting: fa sup, far pro, wrist flex, wrist extension, wrist rd, wrist ud. 20 reps ea (NT)  10# dumbbell carry x 5 min (NT)  5.5# with elbow flexed at 90 degrees x 5 min    BTE Simulator II with slight increase in resistance                  Tool Plane of motion and time                   504   Wrist flex, wrist ext 45 sec ea   302  Rad dev, ulnar dev 45 sec   162  x 60 sec   162 lat pinch,3-jaw pinch 45 sec ea           Patient Education and Home Exercises      Education provided:   - Reviewed HEP  - Progress towards goals     Written Home Exercises Provided: Patient instructed to cont prior HEP.  Exercises were reviewed and Mitesh was able to demonstrate them prior to the end of the session.  Mitesh demonstrated good  understanding of the HEP provided. See EMR under Patient Instructions for exercises provided during therapy sessions.      ASSESSMENT     Mitesh has completed his authorized OT visits today. Deficits remain in  and pinch strengths, endurance, and UE strength.  Left vs Right 62 vs 100 lbs.    Mitesh is a good candidate for physical therapy work conditioning program to prepare him to resume full heavy work  "duties. We are awaiting approval for this from workers comp.     Pt's spiritual, cultural and educational needs considered and pt agreeable to plan of care and goals.    Anticipated barriers to occupational therapy: none    Goals:  Long Term Goals (to be met by discharge):  Date Goal Met:       1.) Mitesh Aquino will demonstrate significantly improved functional performance from re-assessment as measured by a FOTO Intake score of more than 75. (MET)     Goal Status:   (MET and now looking for a score of 95" for return to work.     2.) Mitesh Aquino will return to near to prior level of function for ADLs and household management reporting independence or modified independence.     Goal Status: (MET)     3. Mitesh Aquino will report pain 0 out of 10 at worst to increase functional use of LUE for work and leisure tasks.     Goal Status:   In progress      Short Term Goals (to be met by 10/19/23):  Date Goal Met:       Mitesh Aquino will be independent with home exercise program with written instructions.    9/19/23 Goal Status:  Met     Mitesh Aquino will demonstrate -10/140 degrees of elbow ext/flex to improve functional performance in ADLs/work/leisure tasks.     Goal Status:   In progress -(MET)     Mitesh Aquino will demonstrate within 20 lbs of  strength compared to other side to improve functional grasp for ADLs/work/leisure tasks.     Goal Status:   In progress     Mitesh Aquino will demonstrate the ability to complete ADL/IADL tasks with 2/10 pain.      Goal Status:   In progress (Not yet functioning in heavy lefting requirement for return to work.)         Plan      Awaiting approval to transition to work conditioning.  Continue skilled occupational therapy with individualized plan of care focusing on progression along protocol.     Updates/Grading for next session:  Continue OT, progressing as tolerated per protocol until work conditioning is authorized and scheduled.    Michelle Alfred, OT      "

## 2023-12-22 ENCOUNTER — CLINICAL SUPPORT (OUTPATIENT)
Dept: REHABILITATION | Facility: HOSPITAL | Age: 43
End: 2023-12-22
Payer: COMMERCIAL

## 2023-12-22 DIAGNOSIS — M25.522 LEFT ELBOW PAIN: Primary | ICD-10-CM

## 2023-12-22 DIAGNOSIS — M25.622 DECREASED RANGE OF MOTION OF LEFT ELBOW: ICD-10-CM

## 2023-12-22 PROCEDURE — 97545 WORK HARDENING: CPT

## 2023-12-22 NOTE — PROGRESS NOTES
"[facility]   Physical Therapy Workers' Compensation Initial Evaluation      Name: Mitesh Aquino  Clinic Number: [id number]    Therapy Diagnosis:   Encounter Diagnoses   Name Primary?    Left elbow pain Yes    Decreased range of motion of left elbow      Physician: Taj Mccall     Physician Orders: PT Eval and Treat    Medical Diagnosis from Referral:   S46.212A (ICD-10-CM) - Rupture of left distal biceps tendon, initial encounter   M25.622 (ICD-10-CM) - Decreased range of motion of left elbow     Evaluation Date: 12/22/2023  Authorization Period Expiration: 11/8/2024  Plan of Care Expiration: 2/22/2024  Visit # / Visits authorized: 1/ 12    Foto  Date  Score    #1/3 12/22/2023 7.5   #2/3     #3/3       Time In: 9:15am  Time Out: 11:33am  Total Appointment Time (timed & untimed codes): 138 minutes    Precautions: Standard    Subjective     Date of injury: May 9, 2023  History of current condition - patient fell through ceiling at work, grabbed a beam above him to keep for falling more and ruptured his L biceps. Patient had surgery in August of 2023 and then went through OT for current condition.     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  Small olecranon bony spurs seen.  No joint effusion is noted.    Social History: unremarkable     Pain:  Current 0/10, worst 0/10, best 0/10   Location: L Biceps   Description: " occasionally gets tightness at area of L Biceps since stopping OT  Aggravating Factors: holding arm bent, walking with hands in jacket pocket   Easing Factors: nothing in particular     Pt's goals: Return to employment according to previous level of function, get strength back in arm to facilitate lifting with B arms     Medical History:   No past medical history on file.    Surgical History:   Mitesh Aquino  has a past surgical history that includes Tonsillectomy; knee scope (Bilateral); and repair, tendon, arthroscopic, with conversion to open tendon repair if indicated (Left, 8/9/2023).    Medications:   Mitesh has a current medication list which includes the following prescription(s): aspirin, indomethacin, ondansetron, and oxycodone, and the following Facility-Administered Medications: fentanyl, haloperidol lactate, and sodium chloride 0.9%.    Allergies:   Review of patient's allergies indicates:   Allergen Reactions    Poison ivy extract       Objective    12/22/2023:   R hand dominant   Physiological Movements and Muscle Performance:  R shoulder flexion: 180 degrees   L shoulder flexion: 178 degrees   Elbow Right  Left    (Normal, in degrees) AROM MMT AROM MMT   Flexion (145) 150 5/5 150 4+/5   Extension (10-0) -5 4+/5 +1 4/5   Wrist       Flexion (85) 87 5/5 91 4-/5   Extension (85) 74 5/5 69 4+/5   Ulna deviation (45) 43 5/5 35 4+/5   Radial deviation (20) 33 5/5 24 4/5   Supination (90) 79 4+/5 61 4-/5   Pronation (85) 150 5/5 120 4/5     Hand AROM/MMT Screening    Pt able to make full fist? Yes   Pt able to complete full opening of hand? Yes    Limitations present with thumb flexion/extension, abduction/adduction, opposition? Yes   Pinch Strength: 3 jaw ryley  (R) 18.5 lbs; (L) 15 lbs   Pinch Strength: Tip to tip (R) 24; (L)19.5 lbs   Pinch Strength: Lateral pinch (R) 23 ; (L) 18.5 lbs    Strength (Position 2): 115 lbs  72 lbs     Cardiovascular/pulmonary functions  Six Minute Walk Test (distance in  "feet): 6079  Each lap = 100 ft   Lap 1 100   Lap 2 100  Lap 3 100  Lap 4 100  Lap 5 100  Lap 6 100  Lap 7: 79     Work Related Functional Tasks   Current Ability:   in lbs. Job Demands  In lbs   Lifting Floor to Waist + carry 15ft  #30, x 10 reps  #30-50   Lifting Waist to Shoulder tba #30-50   Lifting Shoulder to Overhead tba #20-30   Pushing #70 x 10 laps (60 ft/lap)   #   Pulling #60 x 10 laps (60 ft/lap) #   Carrying (Single Arm) L #25 x 10 laps (60 ft/lap)  #30-50 cases     Repetitive or Active Work-Related Activities    Current Ability Job Demand   Walking tba tba   Ladder Climbing tba Rarely for maintenance tasks or putting flowers on shelves    Reaching in Front tba #30-50   Gripping Blue (level 4 resistance) digiflex & hand x-  Using knives to cut boxes, work register, use nuts, wrenches and bolds   Overhead Reaching + fine motor manipulation     x10' Frequently, #10-50 for stocking shelves      Intake Outcome Measure for FOTO Elbow Survey    Therapist reviewed FOTO scores   FOTO documents entered into [employer] - see Media section.    Score: 7.5           Treatment     Work conditioning   Patient education on nature of current condition and PHYSICAL THERAPY POC  Written HOME EXERCISE PROGRAM provided, reviewed, patient demo understanding     Stretches   L wrist extension stretch, arm at 90 deg elevation: 30"x5   L forearm supination, #5: 30"x5   L ulnar deviation stretch, #5: 30"x5     Cardio:  Nustep - 2.0 resistance: x 10'    Strengthening:      Functional training:   See objective portion of note above     Home Exercises and Patient Education Provided  Education provided:   - yes    Home Exercises Provided: yes.  Exercises were reviewed and Mitesh was able to demonstrate them prior to the end of the session.  Mitesh demonstrated good  understanding of the education provided.     Assessment     Mitesh is a 43 y.o. referred to outpatient Physical Therapy with a medical diagnosis of rupture " of L distal biceps tendon.     Pt presents with decreased ROM, muscle strength, flexibility, joint mobility. Increased pain, stiffness, soft tissue restriction. Impaired posture, joint mechanics, balance, gait pattern.     The patient's current job specific task deficits include the following: limitation in reaching, lifting, pushing, pulling, carrying with L UE.     Patient prognosis: Good.   Rehab potential: Good.    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    Plan of care discussed with patient: Yes  Patient's spiritual, cultural and educational needs considered and patient is agreeable to the plan of care and goals as stated below:     Anticipated Barriers for therapy: chronicity of current injury    Medical Necessity is demonstrated by the following  History  Co-morbidities and personal factors that may impact the plan of care [] LOW: no personal factors / co-morbidities  [x] MODERATE: 1-2 personal factors / co-morbidities  [] HIGH: 3+ personal factors / co-morbidities    Moderate / High Support Documentation:   Co-morbidities affecting plan of care: not listed     Personal Factors:   coping style  social background  lifestyle     Examination  Body Structures and Functions, activity limitations and participation restrictions that may impact the plan of care [] LOW: addressing 1-2 elements  [x] MODERATE: 3+ elements  [] HIGH: 4+ elements (please support below)    Moderate / High Support Documentation: Moderate      Clinical Presentation [] LOW: stable  [x] MODERATE: Evolving  [] HIGH: Unstable     Decision Making/ Complexity Score: moderate       Goals:   Short Terms Goals: 2 weeks    Goal  Progress  Date    (1)  Patient will be I with HOME EXERCISE PROGRAM  Initial, Not  Met 12/22/2023   (2)  Patient will lift, #50, Floor to Waist + carry 15ft x 10 trials   Initial, Not Met   12/22/2023   (3)   Patient will push/pull #200 x 25 ft using B UE s equally   Initial, Not Met   12/22/2023     Long Term Goals: 4 weeks    Goal  Progress  Date    (1)  Patient will complete L UE single arm carry, #50 x 25 ft  Initial, Not Met  12/22/2023   (2)   Patient will obtain 103.5 L  strength measure with HHD to facilitate being able to open and cut boxes at work   Initial, Not Met  12/22/2023   (3)   Patient will obtain 4+/5 L forearm supination to facilitate improved ability to carry objects with B UEs  Initial, Not Met   12/22/2023     Plan     Plan of care Certification: 12/22/2023 to 2/22/2024.    Outpatient Physical Therapy 3 times weekly for 4 weeks o include the following interventions: Cervical/Lumbar Traction, Electrical Stimulation re-eval, dry needling, Gait Training, Iontophoresis (with ), Manual Therapy, Moist Heat/ Ice, Neuromuscular Re-ed, Patient Education, Self Care, Therapeutic Activities, and Therapeutic Exercise.     Upon discharge from further skilled PT intervention it will determined if the need for a work conditioning program or Functional Capacity Evaluation is required to allow the injured worker to return to work with full potential achieved, continued improvement with body mechanics with advanced functional activities, and further minimize future work-related injuries.     Physical therapist and physical therapy assistant(s) will met face to face to discuss patient's treatment plan and progress towards established goals. Pt will be seen by a physical therapist minimally every 6th visit or every 30 days.    Manjula Fay, PT  12/22/2023       I CERTIFY THE NEED FOR THESE SERVICES FURNISHED UNDER THIS PLAN OF TREATMENT AND WHILE UNDER MY CARE     Physician's comments:           Physician's Signature: ___________________________________________________

## 2023-12-27 ENCOUNTER — CLINICAL SUPPORT (OUTPATIENT)
Dept: REHABILITATION | Facility: HOSPITAL | Age: 43
End: 2023-12-27
Payer: COMMERCIAL

## 2023-12-27 DIAGNOSIS — M25.522 LEFT ELBOW PAIN: Primary | ICD-10-CM

## 2023-12-27 DIAGNOSIS — M25.622 DECREASED RANGE OF MOTION OF LEFT ELBOW: ICD-10-CM

## 2023-12-27 PROCEDURE — 97545 WORK HARDENING: CPT

## 2023-12-27 NOTE — PROGRESS NOTES
"[facility]   Physical Therapy Workers' Compensation Treatment Note      Name: Mitesh Aquino  Clinic Number: [id number]    Therapy Diagnosis:   Encounter Diagnoses   Name Primary?    Left elbow pain Yes    Decreased range of motion of left elbow      Physician: Taj Mccall     Physician Orders: PT Eval and Treat    Medical Diagnosis from Referral:   S46.212A (ICD-10-CM) - Rupture of left distal biceps tendon, initial encounter   M25.622 (ICD-10-CM) - Decreased range of motion of left elbow     Evaluation Date: 12/27/2023  Authorization Period Expiration: 11/8/2024  Plan of Care Expiration: 2/22/2024  Visit # / Visits authorized: 2/ 12    Foto  Date  Score    #1/3 12/22/2023 7.5   #2/3     #3/3       Time In: 8:04am  Time Out: 10:08am  Total Appointment Time: 124 minutes    Precautions: Standard    Subjective   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, stocking, moving containers, sales     Current work restrictions: patient uncertain of current restrictions - states that he was last told about #5 lifting restrictions   Previous work status: 5 days/week, 8 hour days   Current work status: 5 days/week, 8 hour days, doesn't use L arm to lift   Date last worked (if applicable): "I never stopped working"     Pain:  Current 0/10, worst 0/10, best 0/10   Location: L Biceps     Pt's goals: Return to employment according to previous level of function, get strength back in arm to facilitate lifting with B arms     HOME EXERCISE PROGRAM: reviewed, reports compliance   Response: mild soreness at anticipated but no significant changes in status with onset of PHYSICAL THERAPY POC  Function: limitation in heavy lifting, pushing, pulling, carrying and reaching tasks for work related duties     Objective    12/22/2023:   R hand dominant   Physiological Movements and Muscle Performance:  R shoulder flexion: 180 degrees   L shoulder flexion: 178 degrees   Elbow " Right  Left    (Normal, in degrees) AROM MMT AROM MMT   Flexion (145) 150 5/5 150 4+/5   Extension (10-0) -5 4+/5 +1 4/5   Wrist       Flexion (85) 87 5/5 91 4-/5   Extension (85) 74 5/5 69 4+/5   Ulna deviation (45) 43 5/5 35 4+/5   Radial deviation (20) 33 5/5 24 4/5   Supination (90) 79 4+/5 61 4-/5   Pronation (85) 150 5/5 120 4/5     Hand AROM/MMT Screening    Pt able to make full fist? Yes   Pt able to complete full opening of hand? Yes    Limitations present with thumb flexion/extension, abduction/adduction, opposition? Yes   Pinch Strength: 3 jaw ryley  (R) 18.5 lbs; (L) 15 lbs   Pinch Strength: Tip to tip (R) 24; (L)19.5 lbs   Pinch Strength: Lateral pinch (R) 23 ; (L) 18.5 lbs    Strength (Position 2): 115 lbs  72 lbs     Cardiovascular/pulmonary functions  Six Minute Walk Test (distance in feet): 6079  Each lap = 100 ft   Lap 1 100   Lap 2 100  Lap 3 100  Lap 4 100  Lap 5 100  Lap 6 100  Lap 7: 79     Work Related Functional Tasks   Current Ability:   in lbs. Job Demands  In lbs   Lifting Floor to Waist + carry 15ft  #30, x 10 reps  #30-50   Lifting Waist to Shoulder tba #30-50   Lifting Shoulder to Overhead tba #20-30   Pushing #70 x 10 laps (60 ft/lap)   #   Pulling #70 x 10 laps (60 ft/lap) #   Carrying (Single Arm) L #25 x 10 laps (60 ft/lap)  #30-50 cases     Repetitive or Active Work-Related Activities    Current Ability Job Demand   Walking tba tba   Ladder Climbing tba Rarely for maintenance tasks or putting flowers on shelves    Reaching in Front tba #30-50   Gripping Blue (level 4 resistance) digiflex & hand x-  Using knives to cut boxes, work register, use nuts, wrenches and bolds   Overhead Reaching + fine motor manipulation     x10' Frequently, #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    Score: 7.5       Treatment     Work conditioning  "  Stretches   L wrist extension stretch, arm at 90 deg elevation: 30"x5   L forearm supination stretch, #5: 30"x5   L ulnar deviation stretch, #5: 30"x5     Cardio:  Nustep - 2.2 resistance: x 10'  UBE, forward 5'/ retro 5', lvl 2.0 resistance   TM walk,2%, 2.3 mph: x 10'    Strengthening:  L wrist flex/ext, #5: 3x15   L wrist supination/pronation, #5: 3x15   L wrist radial/ulnar deviation, #5: 3x15   L blue digiflex: 3x15   L blue hand X-: 3x15     Functional training:   Moving #10, #9, #8: waist height shelf <-> shoulder height shelf, L UE: x 10'  Sled pushing/pulling, #70; x 10 repetitions, twice  Screw board, #1 cuff weights at wrists: x 10'    Home Exercises and Patient Education Provided  Education provided:   - yes    Home Exercises Provided: yes.  Exercises were reviewed and Mitesh was able to demonstrate them prior to the end of the session.  Mitesh demonstrated good  understanding of the education provided.     Assessment   Patient completes all tasks with proper mechanics and appropriate level of challenge. He appears to experience the most localized fatigue at L hand/digits with wrist strengthening using #5 today. Able to pull #70 x 10 repetitions today.     Pt presents with decreased ROM, muscle strength, flexibility, joint mobility. Increased pain, stiffness, soft tissue restriction. Impaired posture, joint mechanics, balance, gait pattern.     The patient's current job specific task deficits include the following: limitation in reaching, lifting, pushing, pulling, carrying with L UE.     Patient prognosis: Good.   Rehab potential: Good.    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    Plan of care discussed with patient: Yes  Patient's spiritual, cultural " and educational needs considered and patient is agreeable to the plan of care and goals as stated below:     Anticipated Barriers for therapy: chronicity of current injury    Goals:   Short Terms Goals: 2 weeks    Goal  Progress  Date    (1)  Patient will be I with HOME EXERCISE PROGRAM  Initial, Not Met 12/22/2023   (2)  Patient will lift, #50, Floor to Waist + carry 15ft x 10 trials   Initial, Not Met   12/22/2023   (3)   Patient will push/pull #200 x 25 ft using B UE s equally   Initial, Not Met   12/22/2023     Long Term Goals: 4 weeks    Goal  Progress  Date    (1)  Patient will complete L UE single arm carry, #50 x 25 ft  Initial, Not Met  12/22/2023   (2)   Patient will obtain 103.5 L  strength measure with HHD to facilitate being able to open and cut boxes at work   Initial, Not Met  12/22/2023   (3)   Patient will obtain 4+/5 L forearm supination to facilitate improved ability to carry objects with B UEs  Initial, Not Met   12/22/2023     Plan     Plan of care Certification: 12/27/2023 to 2/22/2024.    Outpatient Physical Therapy 3 times weekly for 4 weeks o include the following interventions: Cervical/Lumbar Traction, Electrical Stimulation re-eval, dry needling, Gait Training, Iontophoresis (with ), Manual Therapy, Moist Heat/ Ice, Neuromuscular Re-ed, Patient Education, Self Care, Therapeutic Activities, and Therapeutic Exercise.     Upon discharge from further skilled PT intervention it will determined if the need for a work conditioning program or Functional Capacity Evaluation is required to allow the injured worker to return to work with full potential achieved, continued improvement with body mechanics with advanced functional activities, and further minimize future work-related injuries.     Physical therapist and physical therapy assistant(s) will met face to face to discuss patient's treatment plan and progress towards established goals. Pt will be seen by a physical therapist minimally  every 6th visit or every 30 days.    Manjula Fay, PT  12/27/2023

## 2023-12-27 NOTE — PLAN OF CARE
"[facility]   Physical Therapy Workers' Compensation Initial Evaluation      Name: Mitesh Aquino  Clinic Number: [id number]    Therapy Diagnosis:   Encounter Diagnoses   Name Primary?    Left elbow pain Yes    Decreased range of motion of left elbow      Physician: Taj Mccall     Physician Orders: PT Eval and Treat    Medical Diagnosis from Referral:   S46.212A (ICD-10-CM) - Rupture of left distal biceps tendon, initial encounter   M25.622 (ICD-10-CM) - Decreased range of motion of left elbow     Evaluation Date: 12/22/2023  Authorization Period Expiration: 11/8/2024  Plan of Care Expiration: 2/22/2024  Visit # / Visits authorized: 1/ 12    Foto  Date  Score    #1/3 12/22/2023 7.5   #2/3     #3/3       Time In: 9:15am  Time Out: 11:33am  Total Appointment Time (timed & untimed codes): 138 minutes    Precautions: Standard    Subjective     Date of injury: May 9, 2023  History of current condition - patient fell through ceiling at work, grabbed a beam above him to keep for falling more and ruptured his L biceps. Patient had surgery in August of 2023 and then went through OT for current condition.     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  Small olecranon bony spurs seen.  No joint effusion is noted.    Social History: unremarkable     Pain:  Current 0/10, worst 0/10, best 0/10   Location: L Biceps   Description: " occasionally gets tightness at area of L Biceps since stopping OT  Aggravating Factors: holding arm bent, walking with hands in jacket pocket   Easing Factors: nothing in particular     Pt's goals: Return to employment according to previous level of function, get strength back in arm to facilitate lifting with B arms     Medical History:   No past medical history on file.    Surgical History:   Mitesh Aquino  has a past surgical history that includes Tonsillectomy; knee scope (Bilateral); and repair, tendon, arthroscopic, with conversion to open tendon repair if indicated (Left, 8/9/2023).    Medications:   Mitesh has a current medication list which includes the following prescription(s): aspirin, indomethacin, ondansetron, and oxycodone, and the following Facility-Administered Medications: fentanyl, haloperidol lactate, and sodium chloride 0.9%.    Allergies:   Review of patient's allergies indicates:   Allergen Reactions    Poison ivy extract       Objective    12/22/2023:   R hand dominant   Physiological Movements and Muscle Performance:  R shoulder flexion: 180 degrees   L shoulder flexion: 178 degrees   Elbow Right  Left    (Normal, in degrees) AROM MMT AROM MMT   Flexion (145) 150 5/5 150 4+/5   Extension (10-0) -5 4+/5 +1 4/5   Wrist       Flexion (85) 87 5/5 91 4-/5   Extension (85) 74 5/5 69 4+/5   Ulna deviation (45) 43 5/5 35 4+/5   Radial deviation (20) 33 5/5 24 4/5   Supination (90) 79 4+/5 61 4-/5   Pronation (85) 150 5/5 120 4/5     Hand AROM/MMT Screening    Pt able to make full fist? Yes   Pt able to complete full opening of hand? Yes    Limitations present with thumb flexion/extension, abduction/adduction, opposition? Yes   Pinch Strength: 3 jaw ryley  (R) 18.5 lbs; (L) 15 lbs   Pinch Strength: Tip to tip (R) 24; (L)19.5 lbs   Pinch Strength: Lateral pinch (R) 23 ; (L) 18.5 lbs    Strength (Position 2): 115 lbs  72 lbs     Cardiovascular/pulmonary functions  Six Minute Walk Test (distance in  "feet): 6079  Each lap = 100 ft   Lap 1 100   Lap 2 100  Lap 3 100  Lap 4 100  Lap 5 100  Lap 6 100  Lap 7: 79     Work Related Functional Tasks   Current Ability:   in lbs. Job Demands  In lbs   Lifting Floor to Waist + carry 15ft  #30, x 10 reps  #30-50   Lifting Waist to Shoulder tba #30-50   Lifting Shoulder to Overhead tba #20-30   Pushing #70 x 10 laps (60 ft/lap)   #   Pulling #60 x 10 laps (60 ft/lap) #   Carrying (Single Arm) L #25 x 10 laps (60 ft/lap)  #30-50 cases     Repetitive or Active Work-Related Activities    Current Ability Job Demand   Walking tba tba   Ladder Climbing tba Rarely for maintenance tasks or putting flowers on shelves    Reaching in Front tba #30-50   Gripping Blue (level 4 resistance) digiflex & hand x-  Using knives to cut boxes, work register, use nuts, wrenches and bolds   Overhead Reaching + fine motor manipulation     x10' Frequently, #10-50 for stocking shelves      Intake Outcome Measure for FOTO Elbow Survey    Therapist reviewed FOTO scores   FOTO documents entered into [employer] - see Media section.    Score: 7.5           Treatment     Work conditioning   Patient education on nature of current condition and PHYSICAL THERAPY POC  Written HOME EXERCISE PROGRAM provided, reviewed, patient demo understanding     Stretches   L wrist extension stretch, arm at 90 deg elevation: 30"x5   L forearm supination, #5: 30"x5   L ulnar deviation stretch, #5: 30"x5     Cardio:  Nustep - 2.0 resistance: x 10'    Strengthening:      Functional training:   See objective portion of note above     Home Exercises and Patient Education Provided  Education provided:   - yes    Home Exercises Provided: yes.  Exercises were reviewed and Mitesh was able to demonstrate them prior to the end of the session.  Mitesh demonstrated good  understanding of the education provided.     Assessment     Mitesh is a 43 y.o. referred to outpatient Physical Therapy with a medical diagnosis of rupture " of L distal biceps tendon.     Pt presents with decreased ROM, muscle strength, flexibility, joint mobility. Increased pain, stiffness, soft tissue restriction. Impaired posture, joint mechanics, balance, gait pattern.     The patient's current job specific task deficits include the following: limitation in reaching, lifting, pushing, pulling, carrying with L UE.     Patient prognosis: Good.   Rehab potential: Good.    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    Plan of care discussed with patient: Yes  Patient's spiritual, cultural and educational needs considered and patient is agreeable to the plan of care and goals as stated below:     Anticipated Barriers for therapy: chronicity of current injury    Medical Necessity is demonstrated by the following  History  Co-morbidities and personal factors that may impact the plan of care [] LOW: no personal factors / co-morbidities  [x] MODERATE: 1-2 personal factors / co-morbidities  [] HIGH: 3+ personal factors / co-morbidities    Moderate / High Support Documentation:   Co-morbidities affecting plan of care: not listed     Personal Factors:   coping style  social background  lifestyle     Examination  Body Structures and Functions, activity limitations and participation restrictions that may impact the plan of care [] LOW: addressing 1-2 elements  [x] MODERATE: 3+ elements  [] HIGH: 4+ elements (please support below)    Moderate / High Support Documentation: Moderate      Clinical Presentation [] LOW: stable  [x] MODERATE: Evolving  [] HIGH: Unstable     Decision Making/ Complexity Score: moderate       Goals:   Short Terms Goals: 2 weeks    Goal  Progress  Date    (1)  Patient will be I with HOME EXERCISE PROGRAM  Initial, Not  Met 12/22/2023   (2)  Patient will lift, #50, Floor to Waist + carry 15ft x 10 trials   Initial, Not Met   12/22/2023   (3)   Patient will push/pull #200 x 25 ft using B UE s equally   Initial, Not Met   12/22/2023     Long Term Goals: 4 weeks    Goal  Progress  Date    (1)  Patient will complete L UE single arm carry, #50 x 25 ft  Initial, Not Met  12/22/2023   (2)   Patient will obtain 103.5 L  strength measure with HHD to facilitate being able to open and cut boxes at work   Initial, Not Met  12/22/2023   (3)   Patient will obtain 4+/5 L forearm supination to facilitate improved ability to carry objects with B UEs  Initial, Not Met   12/22/2023     Plan     Plan of care Certification: 12/22/2023 to 2/22/2024.    Outpatient Physical Therapy 3 times weekly for 4 weeks o include the following interventions: Cervical/Lumbar Traction, Electrical Stimulation re-eval, dry needling, Gait Training, Iontophoresis (with ), Manual Therapy, Moist Heat/ Ice, Neuromuscular Re-ed, Patient Education, Self Care, Therapeutic Activities, and Therapeutic Exercise.     Upon discharge from further skilled PT intervention it will determined if the need for a work conditioning program or Functional Capacity Evaluation is required to allow the injured worker to return to work with full potential achieved, continued improvement with body mechanics with advanced functional activities, and further minimize future work-related injuries.     Physical therapist and physical therapy assistant(s) will met face to face to discuss patient's treatment plan and progress towards established goals. Pt will be seen by a physical therapist minimally every 6th visit or every 30 days.    Manjula Fay, PT  12/22/2023       I CERTIFY THE NEED FOR THESE SERVICES FURNISHED UNDER THIS PLAN OF TREATMENT AND WHILE UNDER MY CARE     Physician's comments:           Physician's Signature: ___________________________________________________

## 2023-12-29 ENCOUNTER — CLINICAL SUPPORT (OUTPATIENT)
Dept: REHABILITATION | Facility: HOSPITAL | Age: 43
End: 2023-12-29
Payer: COMMERCIAL

## 2023-12-29 DIAGNOSIS — M25.522 LEFT ELBOW PAIN: Primary | ICD-10-CM

## 2023-12-29 DIAGNOSIS — M25.622 DECREASED RANGE OF MOTION OF LEFT ELBOW: ICD-10-CM

## 2023-12-29 PROCEDURE — 97545 WORK HARDENING: CPT

## 2023-12-29 NOTE — PROGRESS NOTES
"[facility]   Physical Therapy Workers' Compensation Treatment Note      Name: Mitesh Aquino  Clinic Number: [id number]    Therapy Diagnosis:   Encounter Diagnoses   Name Primary?    Left elbow pain Yes    Decreased range of motion of left elbow      Physician: Taj Mccall     Physician Orders: PT Eval and Treat    Medical Diagnosis from Referral:   S46.212A (ICD-10-CM) - Rupture of left distal biceps tendon, initial encounter   M25.622 (ICD-10-CM) - Decreased range of motion of left elbow     Evaluation Date: 12/29/2023  Authorization Period Expiration: 11/8/2024  Plan of Care Expiration: 2/22/2024  Visit # / Visits authorized: 3/ 12    Foto  Date  Score    #1/3 12/22/2023 7.5   #2/3     #3/3       Time In: 9:06am  Time Out: 11:06am  Total Appointment Time: 120 minutes    Precautions: Standard    Subjective   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, stocking, moving containers, sales     Current work restrictions: patient uncertain of current restrictions - states that he was last told about #5 lifting restrictions   Previous work status: 5 days/week, 8 hour days   Current work status: 5 days/week, 8 hour days, doesn't use L arm to lift   Date last worked (if applicable): "I never stopped working"     Pain:  Current 0/10, worst 0/10, best 0/10   Location: L Biceps     Pt's goals: Return to employment according to previous level of function, get strength back in arm to facilitate lifting with B arms     HOME EXERCISE PROGRAM: reviewed, reports compliance   Response: no complaint of soreness following progression of work conditioning last visit   Function: limitation in heavy lifting, pushing, pulling, carrying and reaching tasks for work related duties     Objective    12/22/2023:   R hand dominant   Physiological Movements and Muscle Performance:  R shoulder flexion: 180 degrees   L shoulder flexion: 178 degrees   Elbow Right  Left    (Normal, " in degrees) AROM MMT AROM MMT   Flexion (145) 150 5/5 150 4+/5   Extension (10-0) -5 4+/5 +1 4/5   Wrist       Flexion (85) 87 5/5 91 4-/5   Extension (85) 74 5/5 69 4+/5   Ulna deviation (45) 43 5/5 35 4+/5   Radial deviation (20) 33 5/5 24 4/5   Supination (90) 79 4+/5 61 4-/5   Pronation (85) 150 5/5 120 4/5     Hand AROM/MMT Screening    Pt able to make full fist? Yes   Pt able to complete full opening of hand? Yes    Limitations present with thumb flexion/extension, abduction/adduction, opposition? Yes   Pinch Strength: 3 jaw ryley  (R) 18.5 lbs; (L) 15 lbs   Pinch Strength: Tip to tip (R) 24; (L)19.5 lbs   Pinch Strength: Lateral pinch (R) 23 ; (L) 18.5 lbs    Strength (Position 2): 115 lbs  72 lbs     Cardiovascular/pulmonary functions  Six Minute Walk Test (distance in feet): 6079  Each lap = 100 ft   Lap 1 100   Lap 2 100  Lap 3 100  Lap 4 100  Lap 5 100  Lap 6 100  Lap 7: 79     Work Related Functional Tasks   Current Ability:   in lbs. Job Demands  In lbs   Lifting Floor to Waist + carry 15ft  #35, x 10 reps  #30-50   Lifting Waist to Shoulder tba #30-50   Lifting Shoulder to Overhead tba #20-30   Pushing #77.5 x 10 laps (60 ft/lap)   #   Pulling #77.5 x 10 laps (60 ft/lap) #   Carrying (Single Arm) L #25 x 10 laps (60 ft/lap)  #30-50 cases     Repetitive or Active Work-Related Activities    Current Ability Job Demand   Walking tba tba   Ladder Climbing tba Rarely for maintenance tasks or putting flowers on shelves    Reaching in Front tba #30-50   Gripping Blue (level 4 resistance) digiflex & hand x-  Using knives to cut boxes, work register, use nuts, wrenches and bolds   Overhead Reaching + fine motor manipulation     x10' Frequently, #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    Score: 7.5       Treatment     Work conditioning   Stretches   L wrist  "extension stretch, arm at 90 deg elevation: 30"x5   L forearm supination stretch, #5: 30"x5   L ulnar deviation stretch, #5: 30"x5   +Pec stretch, hands below waist at doorway: 30"x5    Cardio:  Nustep, 3.0 resistance: x 10'  UBE, forward 5'/ retro 5', lvl 2.4 resistance   TM walk,2%, 2.3 mph: x 10'    Strengthening:  L wrist flex/ext, #5: 3x15   L wrist supination/pronation, #5: 3x15   L wrist radial/ulnar deviation, #5: 3x15   L blue digiflex: 3x15   L blue hand X-: 3x15     Functional training:   Moving #10, #9, #8: waist height shelf <-> shoulder height shelf, L UE: x 10'  Sled pushing/pulling, #77.5; x 10 repetitions, twice  Screw board, #1.5 cuff weights at wrists: x 10'  +Lift and carry, 22ft per 1/2 lap, #35,  x 10 laps    Home Exercises and Patient Education Provided  Education provided:   - yes    Home Exercises Provided: yes.  Exercises were reviewed and Mitesh was able to demonstrate them prior to the end of the session.  Mitesh demonstrated good  understanding of the education provided.     Assessment   Patient completes all tasks with proper mechanics and appropriate level of challenge. He appears to experience the most localized fatigue at L hand/digits with wrist strengthening using #5 but is improving in tolerance this session. Able to push/pull #77.5 on sled x 10 repetitions in addition to lift and carry #35 x 10 repetitions today.     Pt presents with decreased ROM, muscle strength, flexibility, joint mobility. Increased pain, stiffness, soft tissue restriction. Impaired posture, joint mechanics, balance, gait pattern.     The patient's current job specific task deficits include the following: limitation in reaching, lifting, pushing, pulling, carrying with L UE.     Patient prognosis: Good.   Rehab potential: Good.    Patient will benefit from skilled outpatient Physical Therapy to address the deficits stated above and in the chart below, provide patient /family education, to maximize " patient's level of independence, and to address work-related functional deficits for their job in [employer].    Plan of care discussed with patient: Yes  Patient's spiritual, cultural and educational needs considered and patient is agreeable to the plan of care and goals as stated below:     Anticipated Barriers for therapy: chronicity of current injury    Goals:   Short Terms Goals: 2 weeks    Goal  Progress  Date    (1)  Patient will be I with HOME EXERCISE PROGRAM  Initial, Not Met 12/22/2023   (2)  Patient will lift, #50, Floor to Waist + carry 15ft x 10 trials   Progressing, Not Met   12/29/2023   (3)   Patient will push/pull #200 x 25 ft using B UE s equally   Progressing, Not Met   12/29/2023     Long Term Goals: 4 weeks    Goal  Progress  Date    (1)  Patient will complete L UE single arm carry, #50 x 25 ft  Initial, Not Met  12/22/2023   (2)   Patient will obtain 103.5 L  strength measure with HHD to facilitate being able to open and cut boxes at work   Initial, Not Met  12/22/2023   (3)   Patient will obtain 4+/5 L forearm supination to facilitate improved ability to carry objects with B UEs  Initial, Not Met   12/22/2023     Plan     Plan of care Certification: 12/29/2023 to 2/22/2024.    Outpatient Physical Therapy 3 times weekly for 4 weeks o include the following interventions: Cervical/Lumbar Traction, Electrical Stimulation re-eval, dry needling, Gait Training, Iontophoresis (with ), Manual Therapy, Moist Heat/ Ice, Neuromuscular Re-ed, Patient Education, Self Care, Therapeutic Activities, and Therapeutic Exercise.     Upon discharge from further skilled PT intervention it will determined if the need for a work conditioning program or Functional Capacity Evaluation is required to allow the injured worker to return to work with full potential achieved, continued improvement with body mechanics with advanced functional activities, and further minimize future work-related injuries.     Physical  therapist and physical therapy assistant(s) will met face to face to discuss patient's treatment plan and progress towards established goals. Pt will be seen by a physical therapist minimally every 6th visit or every 30 days.    Manjula Fya, PT  12/28/2023

## 2024-01-02 ENCOUNTER — CLINICAL SUPPORT (OUTPATIENT)
Dept: REHABILITATION | Facility: HOSPITAL | Age: 44
End: 2024-01-02
Payer: COMMERCIAL

## 2024-01-02 DIAGNOSIS — M25.622 DECREASED RANGE OF MOTION OF LEFT ELBOW: ICD-10-CM

## 2024-01-02 DIAGNOSIS — M25.522 LEFT ELBOW PAIN: Primary | ICD-10-CM

## 2024-01-02 PROCEDURE — 97545 WORK HARDENING: CPT

## 2024-01-02 NOTE — PROGRESS NOTES
"[facility]   Physical Therapy Workers' Compensation Progress Note      Name: Mitesh Aquino  Clinic Number: [id number]    Therapy Diagnosis:   Encounter Diagnoses   Name Primary?    Left elbow pain Yes    Decreased range of motion of left elbow      Physician: Taj Mccall     Physician Orders: PT Eval and Treat    Medical Diagnosis from Referral:   S46.212A (ICD-10-CM) - Rupture of left distal biceps tendon, initial encounter   M25.622 (ICD-10-CM) - Decreased range of motion of left elbow     Evaluation Date: 1/2/2024  Authorization Period Expiration: 11/8/2024  Plan of Care Expiration: 3/2/2024  Visit # / Visits authorized: 4/ 12    Foto  Date  Score    #1/3 12/22/2023 7.5   #2/3 1/2/2024 6.7   #3/3       Time In: 1:03pm  Time Out: 3:03pm  Total Appointment Time: 120 minutes    Precautions: Standard    Subjective   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, stocking, moving containers, sales     Current work restrictions: patient uncertain of current restrictions - states that he was last told about #5 lifting restrictions   Previous work status: 5 days/week, 8 hour days   Current work status: 5 days/week, 8 hour days, doesn't use L arm to lift   Date last worked (if applicable): "I never stopped working"     Pain:  Current 0/10  Location: L Biceps     Pt's goals: Return to employment according to previous level of function, get strength back in arm to facilitate lifting with B arms     HOME EXERCISE PROGRAM: reviewed, reports compliance   Response: feels more soreness at area of L elbow after leaning on the L arm following heavier lifting (of fireworks) and doing more during work conditioning sessions   Function: limitation in heavy lifting, pushing, pulling, carrying and reaching tasks for work related duties     Objective    1/2/2024:  R hand dominant   Physiological Movements and Muscle Performance:  R shoulder flexion: 180 degrees   L " shoulder flexion: 178 degrees   Elbow Right  Left    (Normal, in degrees) AROM MMT AROM MMT   Flexion (145) 150 5/5 150 4+/5   Extension (10-0) -5 4+/5 +1 4/5   Wrist       Flexion (85) 87 5/5 91 4/5   Extension (85) 74 5/5 69 4+/5   Ulna deviation (45) 43 5/5 35 4-/5   Radial deviation (20) 33 5/5 24 4/5   Supination (90) 79 4+/5 61 4/5   Pronation (85) 150 5/5 120 4/5     Hand AROM/MMT Screening    Pt able to make full fist? Yes   Pt able to complete full opening of hand? Yes    Limitations present with thumb flexion/extension, abduction/adduction, opposition? Yes   Pinch Strength: 3 jaw ryley  (R) 18.5 lbs; (L) 16.0 lbs   Pinch Strength: Tip to tip (R) 24; (L)18.0 lbs   Pinch Strength: Lateral pinch (R) 23 ; (L) 18.5 lbs    Strength (Position 2): 115 lbs  72 lbs     Cardiovascular/pulmonary functions  Six Minute Walk Test (distance in feet): 6079  Each lap = 100 ft   Lap 1 100   Lap 2 100  Lap 3 100  Lap 4 100  Lap 5 100  Lap 6 100  Lap 7: 79     Work Related Functional Tasks   Current Ability:   in lbs. Job Demands  In lbs   Lifting Floor to Waist + carry 15ft  #40, x 10 reps  #30-50   Lifting Waist to Shoulder tba #30-50   Lifting Shoulder to Overhead tba #20-30   Pushing #85 x 10 laps (60 ft/lap)   #   Pulling #85 x 10 laps (60 ft/lap) #   Carrying (Single Arm) L #35 x 10 laps (60 ft/lap)  #30-50 cases     Repetitive or Active Work-Related Activities    Current Ability Job Demand   Walking 10' tba   Ladder Climbing tba Rarely for maintenance tasks or putting flowers on shelves    Reaching in Front tba #30-50   Gripping Blue (level 4 resistance) digiflex & hand x-  Using knives to cut boxes, work register, use nuts, wrenches and bolds   Overhead Reaching + fine motor manipulation     x10' Frequently, #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    Score: 6.7        "    Treatment     Work conditioning   Stretches   L wrist extension/flexion stretch, arm at 90 deg elevation: 30"x5   L forearm supination stretch, #5: 30"x5   L ulnar deviation stretch, #5: 30"x5   Pec stretch, hands below waist at doorway: 30"x5    Cardio:  Nustep, 3.0 resistance: x 10'  UBE, forward 5'/ retro 5', lvl 2.6 resistance   TM walk,2%, 2.3 mph: x 10'    Strengthening:  L wrist flex/ext, #6: 3x12  L wrist supination/pronation, #6: 3x12   L wrist radial/ulnar deviation, #6: 3x12   L blue digiflex: 3x15   L blue hand X-: 3x15     Functional training:   Moving #10, #9, #8: waist height shelf <-> shoulder height shelf <-> platforms on either side of shelf, L UE: x 10'  Sled pushing/pulling, #85; x 10 repetitions, twice  Screw board, #1.5 cuff weights at wrists: x 10'  L UE lift and carry, 30ft per 1/2 lap, #35, x 10 laps  +B UE lift and carry, 30ft per 1/2 lap, #45, x 10 laps     Home Exercises and Patient Education Provided  Education provided:   - yes    Home Exercises Provided: yes.  Exercises were reviewed and Mitesh was able to demonstrate them prior to the end of the session.  Mitesh demonstrated good  understanding of the education provided.     Assessment   Minimal strength gains noted at L UE/hand upon taking updated measures today. However, functional tolerances are improving with higher weights used for lifting, carrying, pushing and pulling tasks.     Pt presents with decreased ROM, muscle strength, flexibility, joint mobility. Increased pain, stiffness, soft tissue restriction. Impaired posture, joint mechanics, balance, gait pattern.     The patient's current job specific task deficits include the following: limitation in reaching, lifting, pushing, pulling, carrying with L UE.     Patient prognosis: Good.   Rehab potential: Good.    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    Plan of care discussed with patient: Yes  Patient's spiritual, cultural and educational needs considered and patient is agreeable to the plan of care and goals as stated below:     Anticipated Barriers for therapy: chronicity of current injury    Goals:   Short Terms Goals: 2 weeks    Goal  Progress  Date    (1)  Patient will be I with HOME EXERCISE PROGRAM  Progressing, Not Met 1/2/2024   (2)  Patient will lift, #50, Floor to Waist + carry 15ft x 10 trials   Progressing, Nearly Met   1/2/2024   (3)   Patient will push/pull #200 x 25 ft using B UE s equally   Progressing, Not Met   1/2/2024     Long Term Goals: 4 weeks    Goal  Progress  Date    (1)  Patient will complete L UE single arm carry, #50 x 25 ft  Progressing, Not Met  1/2/2024   (2)   Patient will obtain 103.5 L  strength measure with HHD to facilitate being able to open and cut boxes at work   Ongoing, Not Met  1/2/2024   (3)   Patient will obtain 4+/5 L forearm supination to facilitate improved ability to carry objects with B UEs Progressing, Not Met   1/2/2024     Previous Short Term Goals Status: progressing   New Short Term Goals Status: current remain appropriate   Long Term Goal Status:   progressing   Reasons for Recertification of Therapy: update PT POC    Plan     Plan of care Certification: 1/2/2024 to 3/2/2024    Outpatient Physical Therapy 3 times weekly for 2-3 weeks to include the following interventions: Cervical/Lumbar Traction, Electrical Stimulation re-eval, dry needling, Gait Training, Iontophoresis (with ), Manual Therapy, Moist Heat/ Ice, Neuromuscular Re-ed, Patient Education, Self Care, Therapeutic Activities, and Therapeutic Exercise.     Upon discharge from further skilled PT intervention it will determined if the need for a work conditioning program or Functional Capacity Evaluation is  required to allow the injured worker to return to work with full potential achieved, continued improvement with body mechanics with advanced functional activities, and further minimize future work-related injuries.     Physical therapist and physical therapy assistant(s) will met face to face to discuss patient's treatment plan and progress towards established goals. Pt will be seen by a physical therapist minimally every 6th visit or every 30 days.    Manjula Fay, PT  1/2/2024    I CERTIFY THE NEED FOR THESE SERVICES FURNISHED UNDER THIS PLAN OF TREATMENT AND WHILE UNDER MY CARE     Physician's comments:           Physician's Signature: ___________________________________________________

## 2024-01-02 NOTE — PROGRESS NOTES
CC: Left Distal Biceps Post-Op    DATE OF PROCEDURE: 8/9/2023   PROCEDURES PERFORMED:   Left distal biceps open achilles allograft reconstruction (CPT 00773-58)    Mitesh Aquino, presents today for follow up appointment of his left elbow. Patient is now just under 5 months status post above procedure. He continues to work with PT at [facility].  States the elbow feels good.  Really has no complaints.  He does have some subjective weakness periodically but able to do almost all desired day-to-day activity.  He has been participating in some of his normal work duties at the [employer] but uses predominantly his right upper extremity for lifting.  Does not feel that he is fully recovered from that standpoint.    This is a workman's compensation case.    Prior Hx 10/31/2023:  Mitesh Aquino reports to be doing well just under 12 wk s/p the above mentioned procedure. Doing very well post operatively. He is in PT at [facility] and progressing well. No pain in the elbow or biceps region.  No numbness or tingling.  Would like to go fishing if possible.    PAST MEDICAL HISTORY:   No past medical history on file.    PAST SURGICAL HISTORY:  Past Surgical History:   Procedure Laterality Date    knee scope Bilateral     at 16 years old    REPAIR, TENDON, ARTHROSCOPIC, WITH CONVERSION TO OPEN TENDON REPAIR IF INDICATED Left 8/9/2023    Procedure: REPAIR, TENDON, ARTHROSCOPIC, WITH CONVERSION TO OPEN TENDON REPAIR IF INDICATED, RECONSTRUCTION;  Surgeon: CHRISSY Mccall MD;  Location: [facility] OR;  Service: Orthopedics;  Laterality: Left;  0.2% Ropivacaine    TONSILLECTOMY       FAMILY HISTORY:  No family history on file.    MEDICATIONS:    Current Outpatient Medications:     aspirin (ECOTRIN) 81 MG EC tablet, Take 1 tablet (81 mg total) by mouth 2 (two) times a day. for 14 days, Disp: 28 tablet, Rfl: 0    indomethacin (INDOCIN SR) 75 mg CpSR CR capsule, Take 1 capsule (75 mg total) by mouth daily with breakfast. (Patient not  "taking: Reported on 9/21/2023), Disp: 21 capsule, Rfl: 0    ondansetron (ZOFRAN-ODT) 4 MG TbDL, Dissolve 1 tablet (4 mg total) by mouth every 8 (eight) hours as needed (nausea). (Patient not taking: Reported on 9/21/2023), Disp: 30 tablet, Rfl: 0    oxyCODONE (ROXICODONE) 5 MG immediate release tablet, Take 1-2 tablets (5-10 mg total) by mouth every 4 to 6 hours as needed for Pain. (Patient not taking: Reported on 9/21/2023), Disp: 28 tablet, Rfl: 0  No current facility-administered medications for this visit.    Facility-Administered Medications Ordered in Other Visits:     fentaNYL 50 mcg/mL injection 25 mcg, 25 mcg, Intravenous, Q5 Min PRN, Cintia Nguyen MD    haloperidol lactate injection 0.5 mg, 0.5 mg, Intravenous, Q10 Min PRN, Cintia Nguyen MD    sodium chloride 0.9% flush 10 mL, 10 mL, Intravenous, PRN, Cintia Nguyen MD    ALLERGIES:  Review of patient's allergies indicates:   Allergen Reactions    Poison ivy extract      REVIEW OF SYSTEMS:  Constitution: Negative. Negative for chills, fever and night sweats.    Hematologic/Lymphatic: Negative for bleeding problem. Does not bruise/bleed easily.   Skin: Negative for dry skin, itching and rash.   Musculoskeletal: Negative for falls.  Negative for left elbow pain and muscle weakness.     All other review of symptoms were reviewed and found to be noncontributory.     PHYSICAL EXAMINATION:  Vitals:  /88   Pulse 71   Ht 5' 9" (1.753 m)   Wt 97.1 kg (214 lb 1.1 oz)   BMI 31.61 kg/m²    General: Well-developed well-nourished 43 y.o. malein no acute distress   Cardiovascular: Regular rhythm by palpation of distal pulse, normal color and temperature, no concerning varicosities on symptomatic side   Lungs: No labored breathing or wheezing appreciated   Neuro: Alert and oriented ×3   Psychiatric: well oriented to person, place and time, demonstrates normal mood and affect   Skin: No rashes, lesions or ulcers, normal temperature, turgor, and texture on " uninvolved extremity    Ortho/SPM Exam  Exam of the left elbow demonstrates a well healed surgical scar.  No scar hypertrophy.  No hypersensitivity to touch.  No signs of infections. Non tender to palpation throughout biceps. Able to palpate biceps tendon in antecubital fossa, tensioned well.  No pain to palpation.  No tenderness.  Biceps atrophy noted but improving. Full elbow extension, able to flex hand to shoulder. Full forearm pronation/supination of forearm.  No pain or significant weakness with resisted forearm supination.  NVI distally.     IMAGING:  None.     ASSESSMENT:      ICD-10-CM ICD-9-CM   1. Left arm weakness  R29.898 729.89     PLAN:     In regards to his left elbow, the patient has recovered well.  May gradually resume all activities to tolerance day-to-day.  No heavy lifting for another month.  He has had a good outcome to this point.  I do think he needs some additional strengthening and work hardening prior to return to work.  I will see him back in 2 months.  At that time we will consider a full release for return to work.    Procedures

## 2024-01-02 NOTE — PLAN OF CARE
"[facility]   Physical Therapy Workers' Compensation Progress Note      Name: Mitesh Aquino  Clinic Number: [id number]    Therapy Diagnosis:   Encounter Diagnoses   Name Primary?    Left elbow pain Yes    Decreased range of motion of left elbow      Physician: Taj Mccall     Physician Orders: PT Eval and Treat    Medical Diagnosis from Referral:   S46.212A (ICD-10-CM) - Rupture of left distal biceps tendon, initial encounter   M25.622 (ICD-10-CM) - Decreased range of motion of left elbow     Evaluation Date: 1/2/2024  Authorization Period Expiration: 11/8/2024  Plan of Care Expiration: 3/2/2024  Visit # / Visits authorized: 4/ 12    Foto  Date  Score    #1/3 12/22/2023 7.5   #2/3 1/2/2024 6.7   #3/3       Time In: 1:03pm  Time Out: 3:03pm  Total Appointment Time: 120 minutes    Precautions: Standard    Subjective   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, stocking, moving containers, sales     Current work restrictions: patient uncertain of current restrictions - states that he was last told about #5 lifting restrictions   Previous work status: 5 days/week, 8 hour days   Current work status: 5 days/week, 8 hour days, doesn't use L arm to lift   Date last worked (if applicable): "I never stopped working"     Pain:  Current 0/10  Location: L Biceps     Pt's goals: Return to employment according to previous level of function, get strength back in arm to facilitate lifting with B arms     HOME EXERCISE PROGRAM: reviewed, reports compliance   Response: feels more soreness at area of L elbow after leaning on the L arm following heavier lifting (of fireworks) and doing more during work conditioning sessions   Function: limitation in heavy lifting, pushing, pulling, carrying and reaching tasks for work related duties     Objective    1/2/2024:  R hand dominant   Physiological Movements and Muscle Performance:  R shoulder flexion: 180 degrees   L " shoulder flexion: 178 degrees   Elbow Right  Left    (Normal, in degrees) AROM MMT AROM MMT   Flexion (145) 150 5/5 150 4+/5   Extension (10-0) -5 4+/5 +1 4/5   Wrist       Flexion (85) 87 5/5 91 4/5   Extension (85) 74 5/5 69 4+/5   Ulna deviation (45) 43 5/5 35 4-/5   Radial deviation (20) 33 5/5 24 4/5   Supination (90) 79 4+/5 61 4/5   Pronation (85) 150 5/5 120 4/5     Hand AROM/MMT Screening    Pt able to make full fist? Yes   Pt able to complete full opening of hand? Yes    Limitations present with thumb flexion/extension, abduction/adduction, opposition? Yes   Pinch Strength: 3 jaw ryley  (R) 18.5 lbs; (L) 16.0 lbs   Pinch Strength: Tip to tip (R) 24; (L)18.0 lbs   Pinch Strength: Lateral pinch (R) 23 ; (L) 18.5 lbs    Strength (Position 2): 115 lbs  72 lbs     Cardiovascular/pulmonary functions  Six Minute Walk Test (distance in feet): 6079  Each lap = 100 ft   Lap 1 100   Lap 2 100  Lap 3 100  Lap 4 100  Lap 5 100  Lap 6 100  Lap 7: 79     Work Related Functional Tasks   Current Ability:   in lbs. Job Demands  In lbs   Lifting Floor to Waist + carry 15ft  #40, x 10 reps  #30-50   Lifting Waist to Shoulder tba #30-50   Lifting Shoulder to Overhead tba #20-30   Pushing #85 x 10 laps (60 ft/lap)   #   Pulling #85 x 10 laps (60 ft/lap) #   Carrying (Single Arm) L #35 x 10 laps (60 ft/lap)  #30-50 cases     Repetitive or Active Work-Related Activities    Current Ability Job Demand   Walking 10' tba   Ladder Climbing tba Rarely for maintenance tasks or putting flowers on shelves    Reaching in Front tba #30-50   Gripping Blue (level 4 resistance) digiflex & hand x-  Using knives to cut boxes, work register, use nuts, wrenches and bolds   Overhead Reaching + fine motor manipulation     x10' Frequently, #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    Score: 6.7        "    Treatment     Work conditioning   Stretches   L wrist extension/flexion stretch, arm at 90 deg elevation: 30"x5   L forearm supination stretch, #5: 30"x5   L ulnar deviation stretch, #5: 30"x5   Pec stretch, hands below waist at doorway: 30"x5    Cardio:  Nustep, 3.0 resistance: x 10'  UBE, forward 5'/ retro 5', lvl 2.6 resistance   TM walk,2%, 2.3 mph: x 10'    Strengthening:  L wrist flex/ext, #6: 3x12  L wrist supination/pronation, #6: 3x12   L wrist radial/ulnar deviation, #6: 3x12   L blue digiflex: 3x15   L blue hand X-: 3x15     Functional training:   Moving #10, #9, #8: waist height shelf <-> shoulder height shelf <-> platforms on either side of shelf, L UE: x 10'  Sled pushing/pulling, #85; x 10 repetitions, twice  Screw board, #1.5 cuff weights at wrists: x 10'  L UE lift and carry, 30ft per 1/2 lap, #35, x 10 laps  +B UE lift and carry, 30ft per 1/2 lap, #45, x 10 laps     Home Exercises and Patient Education Provided  Education provided:   - yes    Home Exercises Provided: yes.  Exercises were reviewed and Mitesh was able to demonstrate them prior to the end of the session.  Mitesh demonstrated good  understanding of the education provided.     Assessment   Minimal strength gains noted at L UE/hand upon taking updated measures today. However, functional tolerances are improving with higher weights used for lifting, carrying, pushing and pulling tasks.     Pt presents with decreased ROM, muscle strength, flexibility, joint mobility. Increased pain, stiffness, soft tissue restriction. Impaired posture, joint mechanics, balance, gait pattern.     The patient's current job specific task deficits include the following: limitation in reaching, lifting, pushing, pulling, carrying with L UE.     Patient prognosis: Good.   Rehab potential: Good.    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    Plan of care discussed with patient: Yes  Patient's spiritual, cultural and educational needs considered and patient is agreeable to the plan of care and goals as stated below:     Anticipated Barriers for therapy: chronicity of current injury    Goals:   Short Terms Goals: 2 weeks    Goal  Progress  Date    (1)  Patient will be I with HOME EXERCISE PROGRAM  Progressing, Not Met 1/2/2024   (2)  Patient will lift, #50, Floor to Waist + carry 15ft x 10 trials   Progressing, Nearly Met   1/2/2024   (3)   Patient will push/pull #200 x 25 ft using B UE s equally   Progressing, Not Met   1/2/2024     Long Term Goals: 4 weeks    Goal  Progress  Date    (1)  Patient will complete L UE single arm carry, #50 x 25 ft  Progressing, Not Met  1/2/2024   (2)   Patient will obtain 103.5 L  strength measure with HHD to facilitate being able to open and cut boxes at work   Ongoing, Not Met  1/2/2024   (3)   Patient will obtain 4+/5 L forearm supination to facilitate improved ability to carry objects with B UEs Progressing, Not Met   1/2/2024     Previous Short Term Goals Status: progressing   New Short Term Goals Status: current remain appropriate   Long Term Goal Status:   progressing   Reasons for Recertification of Therapy: update PT POC    Plan     Plan of care Certification: 1/2/2024 to 3/2/2024    Outpatient Physical Therapy 3 times weekly for 2-3 weeks to include the following interventions: Cervical/Lumbar Traction, Electrical Stimulation re-eval, dry needling, Gait Training, Iontophoresis (with ), Manual Therapy, Moist Heat/ Ice, Neuromuscular Re-ed, Patient Education, Self Care, Therapeutic Activities, and Therapeutic Exercise.     Upon discharge from further skilled PT intervention it will determined if the need for a work conditioning program or Functional Capacity Evaluation is  required to allow the injured worker to return to work with full potential achieved, continued improvement with body mechanics with advanced functional activities, and further minimize future work-related injuries.     Physical therapist and physical therapy assistant(s) will met face to face to discuss patient's treatment plan and progress towards established goals. Pt will be seen by a physical therapist minimally every 6th visit or every 30 days.    Manjula Fay, PT  1/2/2024    I CERTIFY THE NEED FOR THESE SERVICES FURNISHED UNDER THIS PLAN OF TREATMENT AND WHILE UNDER MY CARE     Physician's comments:           Physician's Signature: ___________________________________________________

## 2024-01-04 ENCOUNTER — CLINICAL SUPPORT (OUTPATIENT)
Dept: REHABILITATION | Facility: HOSPITAL | Age: 44
End: 2024-01-04
Payer: COMMERCIAL

## 2024-01-04 ENCOUNTER — HOSPITAL ENCOUNTER (OUTPATIENT)
Dept: RADIOLOGY | Facility: HOSPITAL | Age: 44
Discharge: HOME | End: 2024-01-04
Attending: ORTHOPAEDIC SURGERY
Payer: COMMERCIAL

## 2024-01-04 ENCOUNTER — OFFICE VISIT (OUTPATIENT)
Dept: SPORTS MEDICINE | Facility: CLINIC | Age: 44
End: 2024-01-04
Payer: COMMERCIAL

## 2024-01-04 VITALS
DIASTOLIC BLOOD PRESSURE: 88 MMHG | SYSTOLIC BLOOD PRESSURE: 133 MMHG | WEIGHT: 214.06 LBS | BODY MASS INDEX: 31.71 KG/M2 | HEART RATE: 71 BPM | HEIGHT: 69 IN

## 2024-01-04 DIAGNOSIS — R29.898 LEFT ARM WEAKNESS: Primary | ICD-10-CM

## 2024-01-04 DIAGNOSIS — M25.562 LEFT KNEE PAIN: ICD-10-CM

## 2024-01-04 DIAGNOSIS — M11.262 PSEUDOGOUT OF KNEE, LEFT: Primary | ICD-10-CM

## 2024-01-04 DIAGNOSIS — M25.522 LEFT ELBOW PAIN: Primary | ICD-10-CM

## 2024-01-04 DIAGNOSIS — M17.12 PRIMARY OSTEOARTHRITIS OF LEFT KNEE: ICD-10-CM

## 2024-01-04 DIAGNOSIS — M25.622 DECREASED RANGE OF MOTION OF LEFT ELBOW: ICD-10-CM

## 2024-01-04 PROCEDURE — 73562 X-RAY EXAM OF KNEE 3: CPT | Mod: TC,RT

## 2024-01-04 PROCEDURE — 99214 OFFICE O/P EST MOD 30 MIN: CPT | Mod: S$GLB,,, | Performed by: ORTHOPAEDIC SURGERY

## 2024-01-04 PROCEDURE — 20610 DRAIN/INJ JOINT/BURSA W/O US: CPT | Mod: LT,S$GLB,, | Performed by: ORTHOPAEDIC SURGERY

## 2024-01-04 PROCEDURE — 73562 X-RAY EXAM OF KNEE 3: CPT | Mod: 26,59,RT, | Performed by: RADIOLOGY

## 2024-01-04 PROCEDURE — 3079F DIAST BP 80-89 MM HG: CPT | Mod: CPTII,S$GLB,, | Performed by: ORTHOPAEDIC SURGERY

## 2024-01-04 PROCEDURE — 3075F SYST BP GE 130 - 139MM HG: CPT | Mod: CPTII,S$GLB,, | Performed by: ORTHOPAEDIC SURGERY

## 2024-01-04 PROCEDURE — 99999 PR PBB SHADOW E&M-EST. PATIENT-LVL III: CPT | Mod: PBBFAC,,, | Performed by: ORTHOPAEDIC SURGERY

## 2024-01-04 PROCEDURE — 1159F MED LIST DOCD IN RCRD: CPT | Mod: CPTII,S$GLB,, | Performed by: ORTHOPAEDIC SURGERY

## 2024-01-04 PROCEDURE — 3008F BODY MASS INDEX DOCD: CPT | Mod: CPTII,S$GLB,, | Performed by: ORTHOPAEDIC SURGERY

## 2024-01-04 PROCEDURE — 99214 OFFICE O/P EST MOD 30 MIN: CPT | Mod: 25,S$GLB,, | Performed by: ORTHOPAEDIC SURGERY

## 2024-01-04 PROCEDURE — 73564 X-RAY EXAM KNEE 4 OR MORE: CPT | Mod: 26,LT,, | Performed by: RADIOLOGY

## 2024-01-04 PROCEDURE — 97545 WORK HARDENING: CPT | Mod: CQ

## 2024-01-04 RX ORDER — CELECOXIB 200 MG/1
200 CAPSULE ORAL DAILY
Qty: 30 CAPSULE | Refills: 1 | Status: SHIPPED | OUTPATIENT
Start: 2024-01-04

## 2024-01-04 RX ORDER — TRIAMCINOLONE ACETONIDE 40 MG/ML
40 INJECTION, SUSPENSION INTRA-ARTICULAR; INTRAMUSCULAR
Status: DISCONTINUED | OUTPATIENT
Start: 2024-01-04 | End: 2024-01-04 | Stop reason: HOSPADM

## 2024-01-04 RX ADMIN — TRIAMCINOLONE ACETONIDE 40 MG: 40 INJECTION, SUSPENSION INTRA-ARTICULAR; INTRAMUSCULAR at 11:01

## 2024-01-04 NOTE — PROGRESS NOTES
CC: LEFT knee pain    43 y.o. Male who presents today for his left knee as well.  He has significant left knee pain and intermittent swelling dating back to May of this past year.  He denies any antecedent injury mechanism.  He has a history of prior bilateral knee arthroscopic partial meniscectomy procedures performed around the age of 17.  The left knee has bothered him chronically off and on.  He was diagnosed previously with pseudo gout and had a prior needle aspiration years ago.  No recent treatment.  Complaint is recurrent left knee pain and swelling.  Treatment has included activity modifications, rest, oral medication.  No recent injections or aspirations.  No ipsilateral groin or hip pain.  No back or radicular symptoms.  No right knee complaints at this time.    REVIEW OF SYSTEMS:   Constitution: Negative. Negative for chills, fever and night sweats.    Hematologic/Lymphatic: Negative for bleeding problem. Does not bruise/bleed easily.   Skin: Negative for dry skin, itching and rash.   Musculoskeletal: Negative for falls. Positive for left knee pain and  muscle weakness.     PAST MEDICAL HISTORY:   No past medical history on file.    PAST SURGICAL HISTORY:   Past Surgical History:   Procedure Laterality Date    knee scope Bilateral     at 16 years old    REPAIR, TENDON, ARTHROSCOPIC, WITH CONVERSION TO OPEN TENDON REPAIR IF INDICATED Left 8/9/2023    Procedure: REPAIR, TENDON, ARTHROSCOPIC, WITH CONVERSION TO OPEN TENDON REPAIR IF INDICATED, RECONSTRUCTION;  Surgeon: CHRISSY Mccall MD;  Location: [facility];  Service: Orthopedics;  Laterality: Left;  0.2% Ropivacaine    TONSILLECTOMY       FAMILY HISTORY:   No family history on file.    SOCIAL HISTORY:   Social History     Socioeconomic History    Marital status:    Tobacco Use    Smoking status: Former     Types: Cigarettes    Smokeless tobacco: Never    Tobacco comments:     Quit smoking 22 years ago   Substance and Sexual Activity     Alcohol use: Not Currently    Drug use: Not Currently     MEDICATIONS:     Current Outpatient Medications:     aspirin (ECOTRIN) 81 MG EC tablet, Take 1 tablet (81 mg total) by mouth 2 (two) times a day. for 14 days, Disp: 28 tablet, Rfl: 0    celecoxib (CELEBREX) 200 MG capsule, Take 1 capsule (200 mg total) by mouth once daily., Disp: 30 capsule, Rfl: 1    indomethacin (INDOCIN SR) 75 mg CpSR CR capsule, Take 1 capsule (75 mg total) by mouth daily with breakfast. (Patient not taking: Reported on 9/21/2023), Disp: 21 capsule, Rfl: 0    ondansetron (ZOFRAN-ODT) 4 MG TbDL, Dissolve 1 tablet (4 mg total) by mouth every 8 (eight) hours as needed (nausea). (Patient not taking: Reported on 9/21/2023), Disp: 30 tablet, Rfl: 0    oxyCODONE (ROXICODONE) 5 MG immediate release tablet, Take 1-2 tablets (5-10 mg total) by mouth every 4 to 6 hours as needed for Pain. (Patient not taking: Reported on 9/21/2023), Disp: 28 tablet, Rfl: 0  No current facility-administered medications for this visit.    Facility-Administered Medications Ordered in Other Visits:     fentaNYL 50 mcg/mL injection 25 mcg, 25 mcg, Intravenous, Q5 Min PRN, Cintia Nguyen MD    haloperidol lactate injection 0.5 mg, 0.5 mg, Intravenous, Q10 Min PRN, Cintia Nguyen MD    sodium chloride 0.9% flush 10 mL, 10 mL, Intravenous, PRN, Cintia Nguyen MD    ALLERGIES:   Review of patient's allergies indicates:   Allergen Reactions    Poison ivy extract       PHYSICAL EXAMINATION:  There were no vitals taken for this visit.  General: Well-developed well-nourished 43 y.o. malein no acute distress   Cardiovascular: Regular rhythm by palpation of distal pulse, normal color and temperature, no concerning varicosities on symptomatic side   Lungs: No labored breathing or wheezing appreciated   Neuro: Alert and oriented ×3   Psychiatric: well oriented to person, place and time, demonstrates normal mood and affect   Skin: No rashes, lesions or ulcers, normal  temperature, turgor, and texture on involved extremity    Ortho/SPM Exam  Exam of the left knee demonstrates well-healed prior arthroscopic incisions.  1+ effusion.  Full active and passive extension.  Active assisted flexion to 135°.  Some pain to palpation over the medial joint line.  There is a palpable click with Edmund's testing medially.  Negative Lachman's.  Negative posterior drawer.  Stable to varus and valgus stress.  Minimal patellar crepitus.  Negative grind test.    IMAGING:  X-rays including standing, weight bearing AP and flexion bilateral knees, LEFT knee lateral and sunrise views ordered and images reviewed by me show:    Chondrocalcinosis with mild to moderate DJD.    ASSESSMENT:      ICD-10-CM ICD-9-CM   1. Pseudogout of knee, left  M11.262 275.49     712.16   2. Primary osteoarthritis of left knee  M17.12 715.16       PLAN:     Findings discussed with the patient.  He has recurrent pain and swelling related to his underlying chondrocalcinosis/pseudogout and DJD.  Treatment options discussed.  He elected for needle aspiration with a corticosteroid injection.  That was given.  Prescription given for Celebrex.  Discussed activity modifications.  Return to clinic as needed.      Large Joint Aspiration/Injection: L knee    Date/Time: 1/4/2024 11:15 AM    Performed by: CHRISSY Mccall MD  Authorized by: CHRISSY Mccall MD    Consent Done?:  Yes (Verbal)  Indications:  Pain  Site marked: the procedure site was marked    Timeout: prior to procedure the correct patient, procedure, and site was verified    Prep: patient was prepped and draped in usual sterile fashion      Local anesthesia used?: Yes    Local anesthetic:  Co-phenylcaine spray (0.2% Naropin)  Anesthetic total (ml):  4      Details:  Needle Size:  22 G  Ultrasonic Guidance for needle placement?: No    Approach:  Lateral  Location:  Knee  Site:  L knee  Medications:  40 mg triamcinolone acetonide 40 mg/mL  Aspirate amount (mL):   10  Aspirate:  Serous  Patient tolerance:  Patient tolerated the procedure well with no immediate complications

## 2024-01-04 NOTE — PROGRESS NOTES
"[facility]   Physical Therapy Workers' Compensation Progress Note      Name: Mitesh Aquino  Clinic Number: [id number]    Therapy Diagnosis:   Encounter Diagnoses   Name Primary?    Left elbow pain Yes    Decreased range of motion of left elbow      Physician: Taj Mccall     Physician Orders: PT Eval and Treat    Medical Diagnosis from Referral:   S46.212A (ICD-10-CM) - Rupture of left distal biceps tendon, initial encounter   M25.622 (ICD-10-CM) - Decreased range of motion of left elbow     Evaluation Date: 1/2/2024  Authorization Period Expiration: 11/8/2024  Plan of Care Expiration: 3/2/2024  Visit # / Visits authorized: 5/ 12  PTA visit #: 1/5    Foto  Date  Score    #1/3 12/22/2023 7.5   #2/3 1/2/2024 6.7   #3/3       Time In: 12:35 pm  Time Out: 2:35 pm  Total Appointment Time: 120 minutes    Precautions: Standard    Subjective   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, stocking, moving containers, sales     Current work restrictions: patient uncertain of current restrictions - states that he was last told about #5 lifting restrictions   Previous work status: 5 days/week, 8 hour days   Current work status: 5 days/week, 8 hour days, doesn't use L arm to lift   Date last worked (if applicable): "I never stopped working"     Pain:  Current 0/10  Location: L Biceps     Pt's goals: Return to employment according to previous level of function, get strength back in arm to facilitate lifting with B arms     HOME EXERCISE PROGRAM: reviewed, reports compliance   Response: feels more soreness at area of L elbow after leaning on the L arm following heavier lifting (of fireworks) and doing more during work conditioning sessions   Function: limitation in heavy lifting, pushing, pulling, carrying and reaching tasks for work related duties     Objective    1/2/2024:  R hand dominant   Physiological Movements and Muscle Performance:  R shoulder flexion: " 180 degrees   L shoulder flexion: 178 degrees   Elbow Right  Left    (Normal, in degrees) AROM MMT AROM MMT   Flexion (145) 150 5/5 150 4+/5   Extension (10-0) -5 4+/5 +1 4/5   Wrist       Flexion (85) 87 5/5 91 4/5   Extension (85) 74 5/5 69 4+/5   Ulna deviation (45) 43 5/5 35 4-/5   Radial deviation (20) 33 5/5 24 4/5   Supination (90) 79 4+/5 61 4/5   Pronation (85) 150 5/5 120 4/5     Hand AROM/MMT Screening    Pt able to make full fist? Yes   Pt able to complete full opening of hand? Yes    Limitations present with thumb flexion/extension, abduction/adduction, opposition? Yes   Pinch Strength: 3 jaw ryley  (R) 18.5 lbs; (L) 16.0 lbs   Pinch Strength: Tip to tip (R) 24; (L)18.0 lbs   Pinch Strength: Lateral pinch (R) 23 ; (L) 18.5 lbs    Strength (Position 2): 115 lbs  72 lbs     Cardiovascular/pulmonary functions  Six Minute Walk Test (distance in feet): 6079  Each lap = 100 ft   Lap 1 100   Lap 2 100  Lap 3 100  Lap 4 100  Lap 5 100  Lap 6 100  Lap 7: 79     Work Related Functional Tasks   Current Ability:   in lbs. Job Demands  In lbs   Lifting Floor to Waist + carry 15ft  #40, x 10 reps  #30-50   Lifting Waist to Shoulder tba #30-50   Lifting Shoulder to Overhead tba #20-30   Pushing #85 x 10 laps (60 ft/lap)   #   Pulling #85 x 10 laps (60 ft/lap) #   Carrying (Single Arm) L #35 x 10 laps (60 ft/lap)  #30-50 cases     Repetitive or Active Work-Related Activities    Current Ability Job Demand   Walking 10' tba   Ladder Climbing tba Rarely for maintenance tasks or putting flowers on shelves    Reaching in Front tba #30-50   Gripping Blue (level 4 resistance) digiflex & hand x-  Using knives to cut boxes, work register, use nuts, wrenches and bolds   Overhead Reaching + fine motor manipulation     x10' Frequently, #10-50 for stocking shelves      Intake Outcome Measure for FOTO Elbow Survey    Therapist reviewed FOTO scores   FOTO documents entered into [employer] - see Media  "section.    Score: 6.7           Treatment     Work conditioning   Stretches   L wrist extension/flexion stretch, arm at 90 deg elevation: 30"x5   L forearm supination stretch, #5: 30"x5   L ulnar deviation stretch, #5: 30"x5   Pec stretch, hands below waist at doorway: 30"x5    Cardio:  Nustep, 3.0 resistance: x 10'  UBE, forward 5'/ retro 5', lvl 2.6 resistance   TM walk,2%, 2.3 mph: x 10'    Strengthening:  L wrist flex/ext, #6: 3x15  L wrist supination/pronation, #6: 3x15   L wrist radial/ulnar deviation, #6: 3x15   L blue digiflex: 3x15   L blue hand X-: 3x15     Functional training:   Moving #10, #9, #8: waist height shelf <-> shoulder height shelf <-> platforms on either side of shelf, L UE: x 10'  Sled pushing/pulling, #85; x 10 repetitions, twice  Screw board, #1.5 cuff weights at wrists: x 10'  L UE lift and carry, 30ft per 1/2 lap, #35, x 10 laps  L UE lift and carry, 40# kb: 2x2 min/LUE  B UE lift and carry, 30ft per 1/2 lap, #45, x 10 laps (50# next visit)    Home Exercises and Patient Education Provided  Education provided:   - yes    Home Exercises Provided: yes.  Exercises were reviewed and Mitesh was able to demonstrate them prior to the end of the session.  Mitesh demonstrated good  understanding of the education provided.     Assessment   Functional tolerances continue to improve with higher weights used for lifting, carrying, pushing and pulling tasks. Pt presents with decreased ROM, muscle strength, flexibility, joint mobility. Increased pain, stiffness, soft tissue restriction. Impaired posture, joint mechanics, balance, gait pattern.     The patient's current job specific task deficits include the following: limitation in reaching, lifting, pushing, pulling, carrying with L UE.     Patient prognosis: Good.   Rehab potential: Good.    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    Plan of care discussed with patient: Yes  Patient's spiritual, cultural and educational needs considered and patient is agreeable to the plan of care and goals as stated below:     Anticipated Barriers for therapy: chronicity of current injury    Goals:   Short Terms Goals: 2 weeks    Goal  Progress  Date    (1)  Patient will be I with HOME EXERCISE PROGRAM  Progressing, Not Met 1/2/2024   (2)  Patient will lift, #50, Floor to Waist + carry 15ft x 10 trials   Progressing, Nearly Met   1/2/2024   (3)   Patient will push/pull #200 x 25 ft using B UE s equally   Progressing, Not Met   1/2/2024     Long Term Goals: 4 weeks    Goal  Progress  Date    (1)  Patient will complete L UE single arm carry, #50 x 25 ft  Progressing, Not Met  1/2/2024   (2)   Patient will obtain 103.5 L  strength measure with HHD to facilitate being able to open and cut boxes at work   Ongoing, Not Met  1/2/2024   (3)   Patient will obtain 4+/5 L forearm supination to facilitate improved ability to carry objects with B UEs Progressing, Not Met   1/2/2024     Previous Short Term Goals Status: progressing   New Short Term Goals Status: current remain appropriate   Long Term Goal Status:   progressing   Reasons for Recertification of Therapy: update PT POC    Plan     Plan of care Certification: 1/2/2024 to 3/2/2024    Outpatient Physical Therapy 3 times weekly for 2-3 weeks to include the following interventions: Cervical/Lumbar Traction, Electrical Stimulation re-eval, dry needling, Gait Training, Iontophoresis (with ), Manual Therapy, Moist Heat/ Ice, Neuromuscular Re-ed, Patient Education, Self Care, Therapeutic Activities, and Therapeutic Exercise.     Upon discharge from further skilled PT intervention it will determined if the need for a work conditioning program or Functional Capacity Evaluation is  required to allow the injured worker to return to work with full potential achieved, continued improvement with body mechanics with advanced functional activities, and further minimize future work-related injuries.     Physical therapist and physical therapy assistant(s) will met face to face to discuss patient's treatment plan and progress towards established goals. Pt will be seen by a physical therapist minimally every 6th visit or every 30 days.      Anatoly Harding, PTA     01/04/2024

## 2024-01-09 ENCOUNTER — CLINICAL SUPPORT (OUTPATIENT)
Dept: REHABILITATION | Facility: HOSPITAL | Age: 44
End: 2024-01-09
Payer: COMMERCIAL

## 2024-01-09 DIAGNOSIS — M25.522 LEFT ELBOW PAIN: Primary | ICD-10-CM

## 2024-01-09 DIAGNOSIS — M25.622 DECREASED RANGE OF MOTION OF LEFT ELBOW: ICD-10-CM

## 2024-01-09 PROCEDURE — 97545 WORK HARDENING: CPT | Mod: CQ

## 2024-01-09 NOTE — PROGRESS NOTES
"[facility]   Physical Therapy Workers' Compensation Progress Note      Name: Mitesh Aquino  Clinic Number: [id number]    Therapy Diagnosis:   No diagnosis found.    Physician: Taj Mccall     Physician Orders: PT Eval and Treat    Medical Diagnosis from Referral:   S46.212A (ICD-10-CM) - Rupture of left distal biceps tendon, initial encounter   M25.622 (ICD-10-CM) - Decreased range of motion of left elbow     Evaluation Date: 1/9/2024  Authorization Period Expiration: 11/8/2024  Plan of Care Expiration: 3/2/2024  Visit # / Visits authorized: 6/ 12  PTA visit #: 2/5    Foto  Date  Score    #1/3 12/22/2023 7.5   #2/3 1/2/2024 6.7   #3/3       Time In: 10:05 am  Time Out: 12:05 am  Total Appointment Time: 120 minutes    Precautions: Standard    Subjective   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, stocking, moving containers, sales     Current work restrictions: patient uncertain of current restrictions - states that he was last told about #5 lifting restrictions   Previous work status: 5 days/week, 8 hour days   Current work status: 5 days/week, 8 hour days, doesn't use L arm to lift   Date last worked (if applicable): "I never stopped working"     Pain:  Current 0/10  Location: L Biceps     Pt's goals: Return to employment according to previous level of function, get strength back in arm to facilitate lifting with B arms     HOME EXERCISE PROGRAM: reviewed, reports compliance   Response: feels more soreness at area of L elbow after leaning on the L arm following heavier lifting (of fireworks) and doing more during work conditioning sessions   Function: limitation in heavy lifting, pushing, pulling, carrying and reaching tasks for work related duties     Objective    1/2/2024:  R hand dominant   Physiological Movements and Muscle Performance:  R shoulder flexion: 180 degrees   L shoulder flexion: 178 degrees   Elbow Right  Left    (Normal, in " degrees) AROM MMT AROM MMT   Flexion (145) 150 5/5 150 4+/5   Extension (10-0) -5 4+/5 +1 4/5   Wrist       Flexion (85) 87 5/5 91 4/5   Extension (85) 74 5/5 69 4+/5   Ulna deviation (45) 43 5/5 35 4-/5   Radial deviation (20) 33 5/5 24 4/5   Supination (90) 79 4+/5 61 4/5   Pronation (85) 150 5/5 120 4/5     Hand AROM/MMT Screening    Pt able to make full fist? Yes   Pt able to complete full opening of hand? Yes    Limitations present with thumb flexion/extension, abduction/adduction, opposition? Yes   Pinch Strength: 3 jaw ryley  (R) 18.5 lbs; (L) 16.0 lbs   Pinch Strength: Tip to tip (R) 24; (L)18.0 lbs   Pinch Strength: Lateral pinch (R) 23 ; (L) 18.5 lbs    Strength (Position 2): 115 lbs  72 lbs     Cardiovascular/pulmonary functions  Six Minute Walk Test (distance in feet): 6079  Each lap = 100 ft   Lap 1 100   Lap 2 100  Lap 3 100  Lap 4 100  Lap 5 100  Lap 6 100  Lap 7: 79     Work Related Functional Tasks   Current Ability:   in lbs. Job Demands  In lbs   Lifting Floor to Waist + carry 15ft  #40, x 10 reps  #30-50   Lifting Waist to Shoulder tba #30-50   Lifting Shoulder to Overhead tba #20-30   Pushing #85 x 10 laps (60 ft/lap)   #   Pulling #85 x 10 laps (60 ft/lap) #   Carrying (Single Arm) L #35 x 10 laps (60 ft/lap)  #30-50 cases     Repetitive or Active Work-Related Activities    Current Ability Job Demand   Walking 10' tba   Ladder Climbing tba Rarely for maintenance tasks or putting flowers on shelves    Reaching in Front tba #30-50   Gripping Blue (level 4 resistance) digiflex & hand x-  Using knives to cut boxes, work register, use nuts, wrenches and bolds   Overhead Reaching + fine motor manipulation     x10' Frequently, #10-50 for stocking shelves      Intake Outcome Measure for FOTO Elbow Survey    Therapist reviewed FOTO scores   FOTO documents entered into [employer] - see Media section.    Score: 6.7           Treatment     Work conditioning   Stretches   L wrist  "extension/flexion stretch, arm at 90 deg elevation: 30"x5   L forearm supination stretch, #5: 30"x5   L ulnar deviation stretch, #5: 30"x5   Pec stretch, hands below waist at doorway: 30"x5    Cardio:  Nustep, 3.0 resistance: x 10'  UBE, forward 5'/ retro 5', lvl 2.6 resistance   TM walk,2%, 2.3 mph: x 10'    Strengthening:  L wrist flex/ext, #6: 3x15  L wrist supination/pronation, #6: 3x15   L wrist radial/ulnar deviation, #6: 3x15   L blue digiflex: 3x15   L blue hand X-: 3x15     Functional training:   Moving #10, #9, #8: waist height shelf <-> shoulder height shelf <-> platforms on either side of shelf, L UE: x 10'  Sled push, #90: 3 min  Sled pull, #90: 3 min  Screw board, #1.5 cuff weights at wrists: x 10'  L UE lift and carry, 30ft per 1/2 lap, #35, x 10 laps  B upper extremity lift from floor and place on waist height shelf, 50# box: 2x10  L UE lift and carry, 40# kb: 2x2 min/LUE  B UE lift and carry box 20 ft and place on a shelf at waist level and  off the shelf and carry box 20 ft back to place on floor, #50, 2x5    Home Exercises and Patient Education Provided  Education provided:   - yes    Home Exercises Provided: yes.  Exercises were reviewed and Mitesh was able to demonstrate them prior to the end of the session.  Mitesh demonstrated good  understanding of the education provided.     Assessment     Functional tolerances continue to improve with higher weights used for lifting, carrying, pushing and pulling tasks. Patient tolerated all activities with no discomfort to left bicep/elbow.     Pt presents with decreased ROM, muscle strength, flexibility, joint mobility. Increased pain, stiffness, soft tissue restriction. Impaired posture, joint mechanics, balance, gait pattern.     The patient's current job specific task deficits include the following: limitation in reaching, lifting, pushing, pulling, carrying with L UE.     Patient prognosis: Good.   Rehab potential: Good.    Patient will " benefit from skilled outpatient Physical Therapy to address the deficits stated above and in the chart below, provide patient /family education, to maximize patient's level of independence, and to address work-related functional deficits for their job in [employer].    Plan of care discussed with patient: Yes  Patient's spiritual, cultural and educational needs considered and patient is agreeable to the plan of care and goals as stated below:     Anticipated Barriers for therapy: chronicity of current injury    Goals:   Short Terms Goals: 2 weeks    Goal  Progress  Date    (1)  Patient will be I with HOME EXERCISE PROGRAM  Progressing, Not Met 1/2/2024   (2)  Patient will lift, #50, Floor to Waist + carry 15ft x 10 trials   Progressing, Nearly Met   1/2/2024   (3)   Patient will push/pull #200 x 25 ft using B UE s equally   Progressing, Not Met   1/2/2024     Long Term Goals: 4 weeks    Goal  Progress  Date    (1)  Patient will complete L UE single arm carry, #50 x 25 ft  Progressing, Not Met  1/2/2024   (2)   Patient will obtain 103.5 L  strength measure with HHD to facilitate being able to open and cut boxes at work   Ongoing, Not Met  1/2/2024   (3)   Patient will obtain 4+/5 L forearm supination to facilitate improved ability to carry objects with B UEs Progressing, Not Met   1/2/2024     Previous Short Term Goals Status: progressing   New Short Term Goals Status: current remain appropriate   Long Term Goal Status:   progressing   Reasons for Recertification of Therapy: update PT POC    Plan     Plan of care Certification: 1/9/2024 to 3/2/2024    Outpatient Physical Therapy 3 times weekly for 2-3 weeks to include the following interventions: Cervical/Lumbar Traction, Electrical Stimulation re-eval, dry needling, Gait Training, Iontophoresis (with ), Manual Therapy, Moist Heat/ Ice, Neuromuscular Re-ed, Patient Education, Self Care, Therapeutic Activities, and Therapeutic Exercise.     Upon discharge from  further skilled PT intervention it will determined if the need for a work conditioning program or Functional Capacity Evaluation is required to allow the injured worker to return to work with full potential achieved, continued improvement with body mechanics with advanced functional activities, and further minimize future work-related injuries.     Physical therapist and physical therapy assistant(s) will met face to face to discuss patient's treatment plan and progress towards established goals. Pt will be seen by a physical therapist minimally every 6th visit or every 30 days.      Anatoly Harding, PTA     01/09/2024

## 2024-01-12 ENCOUNTER — CLINICAL SUPPORT (OUTPATIENT)
Dept: REHABILITATION | Facility: HOSPITAL | Age: 44
End: 2024-01-12
Payer: COMMERCIAL

## 2024-01-12 DIAGNOSIS — M25.622 DECREASED RANGE OF MOTION OF LEFT ELBOW: ICD-10-CM

## 2024-01-12 DIAGNOSIS — M25.522 LEFT ELBOW PAIN: Primary | ICD-10-CM

## 2024-01-12 PROCEDURE — 97545 WORK HARDENING: CPT

## 2024-01-12 NOTE — PROGRESS NOTES
"[facility]   Physical Therapy Workers' Compensation Progress Note      Name: Mitesh Aquino  Clinic Number: [id number]    Therapy Diagnosis:   Encounter Diagnoses   Name Primary?    Left elbow pain Yes    Decreased range of motion of left elbow      Physician: Taj Mccall     Physician Orders: PT Eval and Treat    Medical Diagnosis from Referral:   S46.212A (ICD-10-CM) - Rupture of left distal biceps tendon, initial encounter   M25.622 (ICD-10-CM) - Decreased range of motion of left elbow     Evaluation Date: 1/12/2024  Authorization Period Expiration: 11/8/2024  Plan of Care Expiration: 3/12/2024  Visit # / Visits authorized: 7/ 12  PTA visit #: 2/5    Foto  Date  Score    #1/3 12/22/2023 7.5   #2/3 1/2/2024 6.7   #3/3       Time In: 10:07am  Time Out: 12:07pm  Total Appointment Time: 120 minutes    Precautions: Standard    Subjective   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, stocking, moving containers, sales     Current work restrictions: patient uncertain of current restrictions - states that he was last told about #5 lifting restrictions   Previous work status: 5 days/week, 8 hour days   Current work status: 5 days/week, 8 hour days, doesn't use L arm to lift   Date last worked (if applicable): "I never stopped working"     Pain:  Current 0/10  Location: L Biceps     Pt's goals: Return to employment according to previous level of function, get strength back in arm to facilitate lifting with B arms     HOME EXERCISE PROGRAM: reviewed, reports compliance   Response: feels more soreness at area of L elbow after leaning on the L arm following heavier lifting (of fireworks) and doing more during work conditioning sessions   Function: limitation in heavy lifting, pushing, pulling, carrying and reaching tasks for work related duties     Objective    1/12/2024:  R hand dominant   Physiological Movements and Muscle Performance:  R shoulder " flexion: 180 degrees   L shoulder flexion: 178 degrees   Elbow Right  Left    (Normal, in degrees) AROM MMT AROM MMT   Flexion (145) 150 5/5 150 4+/5   Extension (10-0) -5 4+/5 +1 4/5   Wrist       Flexion (85) 87 5/5 91 4/5   Extension (85) 74 5/5 69 4+/5   Ulna deviation (45) 43 5/5 35 4/5   Radial deviation (20) 33 5/5 24 4/5   Supination (90) 79 4+/5 61 4/5   Pronation (85) 150 5/5 120 4+/5     Hand AROM/MMT Screening    Pt able to make full fist? Yes   Pt able to complete full opening of hand? Yes    Limitations present with thumb flexion/extension, abduction/adduction, opposition? Yes   Pinch Strength: 3 jaw ryley  (R) 18.5 lbs; (L) 14.5 lbs   Pinch Strength: Tip to tip (R) 24; (L)19.0 lbs   Pinch Strength: Lateral pinch (R) 23 ; (L) 18.5 lbs    Strength (Position 2): 115 lbs  74 lbs     Cardiovascular/pulmonary functions  Six Minute Walk Test (distance in feet): 6079  Each lap = 100 ft   Lap 1 100   Lap 2 100  Lap 3 100  Lap 4 100  Lap 5 100  Lap 6 100  Lap 7: 79     Work Related Functional Tasks   Current Ability:   in lbs. Job Demands  In lbs   Lifting Floor to Waist + carry 15ft  #40, x 10 reps  #30-50   Lifting Floor - Chest  #50 x 10 reps  #30-50   Lifting Shoulder to Overhead tba #20-30   Pushing #100 x 3' (60 ft/lap)   #   Pulling #100 x 3' (60 ft/lap) #   Carrying (Single Arm) L #40 x 2' twice (60 ft/lap)  #30-50 cases     Repetitive or Active Work-Related Activities    Current Ability Job Demand   Walking 10' tba   Ladder Climbing tba Rarely for maintenance tasks or putting flowers on shelves    Reaching in Front tba #30-50   Gripping Blue (level 4 resistance) digiflex & Black (level 5 ) hand x-  Using knives to cut boxes, work register, use nuts, wrenches and bolds   Overhead Reaching + fine motor manipulation     X10' with #2 cuff weights at wrists  Frequently, #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    Score: 6.7           Treatment     Work conditioning   Stretches   L wrist extension/flexion stretch, arm at 90 deg elevation: 30"x5   L forearm supination stretch, #6: 30"x5   L ulnar deviation stretch, #5: 30"x5   Pec stretch, hands below waist at doorway: 30"x5    Cardio:  Nustep, 3.0 resistance: x 10'  UBE, forward 5'/ retro 5', lvl 2.6 resistance   TM walk,2%, 2.3 mph: x 10'    Strengthening:  L wrist flex/ext, #7: 3x12  L wrist supination/pronation, #7: 3x12   L wrist radial/ulnar deviation, #7: 3x12  L black digiflex: 3x15   L blue hand X-: 3x15     Functional training:   Moving #10, #9, #8, #7: waist height shelf <-> shoulder height shelf <-> platforms on either side of shelf, L UE: x 10'  Sled push, #100: 3 min  Sled pull, #100: 3 min  Screw board, #2 cuff weights at B wrists: x 10'  B upper extremity lift from floor and place on waist height shelf, 50# box: 2x10  L UE lift and carry, 40# kb: 2x2 min/LUE  B UE lift and carry box 20 ft and place on a shelf at chest level and  off the shelf and carry box 20 ft back to place on floor, #50, 2x5    Home Exercises and Patient Education Provided  Education provided:   - yes    Home Exercises Provided: yes.  Exercises were reviewed and Mitesh was able to demonstrate them prior to the end of the session.  Mitesh demonstrated good  understanding of the education provided.     Assessment     Functional tolerances continue to improve with higher weights used for lifting, carrying, pushing and pulling tasks. Patient tolerated all activities with no discomfort to left bicep/elbow. L  strength shows improvement by #2 and tip to tip pinch improved by #1.      Pt presents with decreased ROM, muscle strength, flexibility, joint mobility. Increased pain, stiffness, soft tissue restriction. Impaired posture, joint mechanics, balance, gait pattern.     The patient's current job specific task deficits " include the following: limitation in reaching, lifting, pushing, pulling, carrying with L UE.     Patient prognosis: Good.   Rehab potential: Good.    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    Plan of care discussed with patient: Yes  Patient's spiritual, cultural and educational needs considered and patient is agreeable to the plan of care and goals as stated below:     Anticipated Barriers for therapy: chronicity of current injury    Goals:   Short Terms Goals: 2 weeks    Goal  Progress  Date    (1)  Patient will be I with HOME EXERCISE PROGRAM  Progressing, Not Met 1/12/2024   (2)  Patient will lift, #50, Floor to Waist + carry 15ft x 10 trials   Progressing, Nearly Met   1/12/2024   (3)   Patient will push/pull #200 x 25 ft using B UE s equally   Progressing, Not Met   1/12/2024     Long Term Goals: 4 weeks    Goal  Progress  Date    (1)  Patient will complete L UE single arm carry, #50 x 25 ft  Met  1/12/2024   (2)   Patient will obtain 103.5 L  strength measure with HHD to facilitate being able to open and cut boxes at work  Progressing, Not Met  1/12/2024   (3)   Patient will obtain 4+/5 L forearm supination to facilitate improved ability to carry objects with B UEs Progressing, Not Met   1/12/2024     Previous Short Term Goals Status: progressing   New Short Term Goals Status: current remain appropriate   Long Term Goal Status:   progressing   Reasons for Recertification of Therapy: update PT POC    Plan     Plan of care Certification: 1/12/2024 to 3/12/2024    Outpatient Physical Therapy 3 times weekly for 2 weeks to include the following interventions: Cervical/Lumbar Traction, Electrical Stimulation re-eval, dry needling, Gait Training, Iontophoresis (with ),  Manual Therapy, Moist Heat/ Ice, Neuromuscular Re-ed, Patient Education, Self Care, Therapeutic Activities, and Therapeutic Exercise.     Upon discharge from further skilled PT intervention it will determined if the need for a work conditioning program or Functional Capacity Evaluation is required to allow the injured worker to return to work with full potential achieved, continued improvement with body mechanics with advanced functional activities, and further minimize future work-related injuries.     Physical therapist and physical therapy assistant(s) will met face to face to discuss patient's treatment plan and progress towards established goals. Pt will be seen by a physical therapist minimally every 6th visit or every 30 days.      Manjula Fay, PT     01/12/2024     I CERTIFY THE NEED FOR THESE SERVICES FURNISHED UNDER THIS PLAN OF TREATMENT AND WHILE UNDER MY CARE     Physician's comments:           Physician's Signature: ___________________________________________________

## 2024-01-12 NOTE — PLAN OF CARE
"[facility]   Physical Therapy Workers' Compensation Progress Note      Name: Mitesh Aquino  Clinic Number: [id number]    Therapy Diagnosis:   Encounter Diagnoses   Name Primary?    Left elbow pain Yes    Decreased range of motion of left elbow      Physician: Taj Mccall     Physician Orders: PT Eval and Treat    Medical Diagnosis from Referral:   S46.212A (ICD-10-CM) - Rupture of left distal biceps tendon, initial encounter   M25.622 (ICD-10-CM) - Decreased range of motion of left elbow     Evaluation Date: 1/12/2024  Authorization Period Expiration: 11/8/2024  Plan of Care Expiration: 3/12/2024  Visit # / Visits authorized: 7/ 12  PTA visit #: 2/5    Foto  Date  Score    #1/3 12/22/2023 7.5   #2/3 1/2/2024 6.7   #3/3       Time In: 10:07am  Time Out: 12:07pm  Total Appointment Time: 120 minutes    Precautions: Standard    Subjective   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, stocking, moving containers, sales     Current work restrictions: patient uncertain of current restrictions - states that he was last told about #5 lifting restrictions   Previous work status: 5 days/week, 8 hour days   Current work status: 5 days/week, 8 hour days, doesn't use L arm to lift   Date last worked (if applicable): "I never stopped working"     Pain:  Current 0/10  Location: L Biceps     Pt's goals: Return to employment according to previous level of function, get strength back in arm to facilitate lifting with B arms     HOME EXERCISE PROGRAM: reviewed, reports compliance   Response: feels more soreness at area of L elbow after leaning on the L arm following heavier lifting (of fireworks) and doing more during work conditioning sessions   Function: limitation in heavy lifting, pushing, pulling, carrying and reaching tasks for work related duties     Objective    1/12/2024:  R hand dominant   Physiological Movements and Muscle Performance:  R shoulder " flexion: 180 degrees   L shoulder flexion: 178 degrees   Elbow Right  Left    (Normal, in degrees) AROM MMT AROM MMT   Flexion (145) 150 5/5 150 4+/5   Extension (10-0) -5 4+/5 +1 4/5   Wrist       Flexion (85) 87 5/5 91 4/5   Extension (85) 74 5/5 69 4+/5   Ulna deviation (45) 43 5/5 35 4/5   Radial deviation (20) 33 5/5 24 4/5   Supination (90) 79 4+/5 61 4/5   Pronation (85) 150 5/5 120 4+/5     Hand AROM/MMT Screening    Pt able to make full fist? Yes   Pt able to complete full opening of hand? Yes    Limitations present with thumb flexion/extension, abduction/adduction, opposition? Yes   Pinch Strength: 3 jaw ryley  (R) 18.5 lbs; (L) 14.5 lbs   Pinch Strength: Tip to tip (R) 24; (L)19.0 lbs   Pinch Strength: Lateral pinch (R) 23 ; (L) 18.5 lbs    Strength (Position 2): 115 lbs  74 lbs     Cardiovascular/pulmonary functions  Six Minute Walk Test (distance in feet): 6079  Each lap = 100 ft   Lap 1 100   Lap 2 100  Lap 3 100  Lap 4 100  Lap 5 100  Lap 6 100  Lap 7: 79     Work Related Functional Tasks   Current Ability:   in lbs. Job Demands  In lbs   Lifting Floor to Waist + carry 15ft  #40, x 10 reps  #30-50   Lifting Floor - Chest  #50 x 10 reps  #30-50   Lifting Shoulder to Overhead tba #20-30   Pushing #100 x 3' (60 ft/lap)   #   Pulling #100 x 3' (60 ft/lap) #   Carrying (Single Arm) L #40 x 2' twice (60 ft/lap)  #30-50 cases     Repetitive or Active Work-Related Activities    Current Ability Job Demand   Walking 10' tba   Ladder Climbing tba Rarely for maintenance tasks or putting flowers on shelves    Reaching in Front tba #30-50   Gripping Blue (level 4 resistance) digiflex & Black (level 5 ) hand x-  Using knives to cut boxes, work register, use nuts, wrenches and bolds   Overhead Reaching + fine motor manipulation     X10' with #2 cuff weights at wrists  Frequently, #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    Score: 6.7           Treatment     Work conditioning   Stretches   L wrist extension/flexion stretch, arm at 90 deg elevation: 30"x5   L forearm supination stretch, #6: 30"x5   L ulnar deviation stretch, #5: 30"x5   Pec stretch, hands below waist at doorway: 30"x5    Cardio:  Nustep, 3.0 resistance: x 10'  UBE, forward 5'/ retro 5', lvl 2.6 resistance   TM walk,2%, 2.3 mph: x 10'    Strengthening:  L wrist flex/ext, #7: 3x12  L wrist supination/pronation, #7: 3x12   L wrist radial/ulnar deviation, #7: 3x12  L black digiflex: 3x15   L blue hand X-: 3x15     Functional training:   Moving #10, #9, #8, #7: waist height shelf <-> shoulder height shelf <-> platforms on either side of shelf, L UE: x 10'  Sled push, #100: 3 min  Sled pull, #100: 3 min  Screw board, #2 cuff weights at B wrists: x 10'  B upper extremity lift from floor and place on waist height shelf, 50# box: 2x10  L UE lift and carry, 40# kb: 2x2 min/LUE  B UE lift and carry box 20 ft and place on a shelf at chest level and  off the shelf and carry box 20 ft back to place on floor, #50, 2x5    Home Exercises and Patient Education Provided  Education provided:   - yes    Home Exercises Provided: yes.  Exercises were reviewed and Mitesh was able to demonstrate them prior to the end of the session.  Mitesh demonstrated good  understanding of the education provided.     Assessment     Functional tolerances continue to improve with higher weights used for lifting, carrying, pushing and pulling tasks. Patient tolerated all activities with no discomfort to left bicep/elbow. L  strength shows improvement by #2 and tip to tip pinch improved by #1.      Pt presents with decreased ROM, muscle strength, flexibility, joint mobility. Increased pain, stiffness, soft tissue restriction. Impaired posture, joint mechanics, balance, gait pattern.     The patient's current job specific task deficits " include the following: limitation in reaching, lifting, pushing, pulling, carrying with L UE.     Patient prognosis: Good.   Rehab potential: Good.    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    Plan of care discussed with patient: Yes  Patient's spiritual, cultural and educational needs considered and patient is agreeable to the plan of care and goals as stated below:     Anticipated Barriers for therapy: chronicity of current injury    Goals:   Short Terms Goals: 2 weeks    Goal  Progress  Date    (1)  Patient will be I with HOME EXERCISE PROGRAM  Progressing, Not Met 1/12/2024   (2)  Patient will lift, #50, Floor to Waist + carry 15ft x 10 trials   Progressing, Nearly Met   1/12/2024   (3)   Patient will push/pull #200 x 25 ft using B UE s equally   Progressing, Not Met   1/12/2024     Long Term Goals: 4 weeks    Goal  Progress  Date    (1)  Patient will complete L UE single arm carry, #50 x 25 ft  Met  1/12/2024   (2)   Patient will obtain 103.5 L  strength measure with HHD to facilitate being able to open and cut boxes at work  Progressing, Not Met  1/12/2024   (3)   Patient will obtain 4+/5 L forearm supination to facilitate improved ability to carry objects with B UEs Progressing, Not Met   1/12/2024     Previous Short Term Goals Status: progressing   New Short Term Goals Status: current remain appropriate   Long Term Goal Status:   progressing   Reasons for Recertification of Therapy: update PT POC    Plan     Plan of care Certification: 1/12/2024 to 3/12/2024    Outpatient Physical Therapy 3 times weekly for 2 weeks to include the following interventions: Cervical/Lumbar Traction, Electrical Stimulation re-eval, dry needling, Gait Training, Iontophoresis (with ),  Manual Therapy, Moist Heat/ Ice, Neuromuscular Re-ed, Patient Education, Self Care, Therapeutic Activities, and Therapeutic Exercise.     Upon discharge from further skilled PT intervention it will determined if the need for a work conditioning program or Functional Capacity Evaluation is required to allow the injured worker to return to work with full potential achieved, continued improvement with body mechanics with advanced functional activities, and further minimize future work-related injuries.     Physical therapist and physical therapy assistant(s) will met face to face to discuss patient's treatment plan and progress towards established goals. Pt will be seen by a physical therapist minimally every 6th visit or every 30 days.      Manjula Fay, PT     01/12/2024     I CERTIFY THE NEED FOR THESE SERVICES FURNISHED UNDER THIS PLAN OF TREATMENT AND WHILE UNDER MY CARE     Physician's comments:           Physician's Signature: ___________________________________________________

## 2024-01-16 ENCOUNTER — CLINICAL SUPPORT (OUTPATIENT)
Dept: REHABILITATION | Facility: HOSPITAL | Age: 44
End: 2024-01-16
Payer: COMMERCIAL

## 2024-01-16 DIAGNOSIS — M25.622 DECREASED RANGE OF MOTION OF LEFT ELBOW: ICD-10-CM

## 2024-01-16 DIAGNOSIS — M25.522 LEFT ELBOW PAIN: Primary | ICD-10-CM

## 2024-01-16 PROCEDURE — 97545 WORK HARDENING: CPT | Mod: CQ

## 2024-01-16 NOTE — PROGRESS NOTES
"[facility]   Physical Therapy Workers' Compensation Progress Note      Name: Mitesh Aquino  Clinic Number: [id number]    Therapy Diagnosis:   Encounter Diagnoses   Name Primary?    Left elbow pain Yes    Decreased range of motion of left elbow      Physician: Taj Mccall     Physician Orders: PT Eval and Treat    Medical Diagnosis from Referral:   S46.212A (ICD-10-CM) - Rupture of left distal biceps tendon, initial encounter   M25.622 (ICD-10-CM) - Decreased range of motion of left elbow     Evaluation Date: 1/16/2024  Authorization Period Expiration: 11/8/2024  Plan of Care Expiration: 3/12/2024  Visit # / Visits authorized: 8/ 12  PTA visit #: 1/5    Foto  Date  Score    #1/3 12/22/2023 7.5   #2/3 1/2/2024 6.7   #3/3 1/16/2024 .8     Time In: 10:05 am  Time Out: 12:05 pm  Total Appointment Time: 120 minutes    Precautions: Standard    Subjective   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, stocking, moving containers, sales     Current work restrictions: no restrictions at this time   Previous work status: 5 days/week, 8 hour days   Current work status: 5 days/week, 8 hour days, doesn't use L arm to lift   Date last worked (if applicable): "I never stopped working"     Pain:  Current 0/10  Location: L Biceps     Pt's goals: Return to employment according to previous level of function, get strength back in arm to facilitate lifting with B arms     HOME EXERCISE PROGRAM: reviewed, reports compliance   Response: feels more soreness at area of L elbow after leaning on the L arm following heavier lifting (of fireworks) and doing more during work conditioning sessions   Function: limitation in heavy lifting, pushing, pulling, carrying and reaching tasks for work related duties     Objective    1/12/2024:  R hand dominant   Physiological Movements and Muscle Performance:  R shoulder flexion: 180 degrees   L shoulder flexion: 178 degrees   Elbow Right "  Left    (Normal, in degrees) AROM MMT AROM MMT   Flexion (145) 150 5/5 150 4+/5   Extension (10-0) -5 4+/5 +1 4/5   Wrist       Flexion (85) 87 5/5 91 4/5   Extension (85) 74 5/5 69 4+/5   Ulna deviation (45) 43 5/5 35 4/5   Radial deviation (20) 33 5/5 24 4/5   Supination (90) 79 4+/5 61 4/5   Pronation (85) 150 5/5 120 4+/5     Hand AROM/MMT Screening    Pt able to make full fist? Yes   Pt able to complete full opening of hand? Yes    Limitations present with thumb flexion/extension, abduction/adduction, opposition? Yes   Pinch Strength: 3 jaw ryley  (R) 18.5 lbs; (L) 14.5 lbs   Pinch Strength: Tip to tip (R) 24; (L)19.0 lbs   Pinch Strength: Lateral pinch (R) 23 ; (L) 18.5 lbs    Strength (Position 2): 115 lbs  74 lbs     Cardiovascular/pulmonary functions  Six Minute Walk Test (distance in feet): 6079  Each lap = 100 ft   Lap 1 100   Lap 2 100  Lap 3 100  Lap 4 100  Lap 5 100  Lap 6 100  Lap 7: 79     Work Related Functional Tasks   Current Ability:   in lbs. Job Demands  In lbs   Lifting Floor to Waist + carry 15ft  #40, x 10 reps  #30-50   Lifting Floor - Chest  #50 x 10 reps  #30-50   Lifting Shoulder to Overhead tba #20-30   Pushing #100 x 3' (60 ft/lap)   #   Pulling #100 x 3' (60 ft/lap) #   Carrying (Single Arm) L #40 x 2' twice (60 ft/lap)  #30-50 cases     Repetitive or Active Work-Related Activities    Current Ability Job Demand   Walking 10' tba   Ladder Climbing tba Rarely for maintenance tasks or putting flowers on shelves    Reaching in Front tba #30-50   Gripping Blue (level 4 resistance) digiflex & Black (level 5 ) hand x-  Using knives to cut boxes, work register, use nuts, wrenches and bolds   Overhead Reaching + fine motor manipulation     X10' with #2 cuff weights at wrists  Frequently, #10-50 for stocking shelves      Intake Outcome Measure for FOTO Elbow Survey    Therapist reviewed FOTO scores   FOTO documents entered into [employer] - see Media section.    Score:  "6.7           Treatment     Work conditioning   Stretches   L wrist extension/flexion stretch, arm at 90 deg elevation: 30"x5   L forearm supination stretch, #6: 30"x5   L ulnar deviation stretch, #5: 30"x5   Pec stretch, hands below waist at doorway: 30"x5    Cardio:  Nustep, 3.0 resistance: x 10'  UBE, forward 5'/ retro 5', lvl 3.0 resistance   TM walk,2%, 2.3 mph: x 10'    Strengthening:  L wrist flex/ext, #7: 3x12  L wrist supination/pronation, #7: 3x12   L wrist radial/ulnar deviation, #7: 3x12  L black digiflex: 3x15   L blue hand X-: 3x15     Functional training:   Moving #10, #9, #8, #7: waist height shelf <-> shoulder height shelf <-> platforms on either side of shelf, L UE: x 10'  Sled push, #100: 3 min  Sled pull, #100: 3 min  Screw board, #3 cuff weights at B wrists: 2 x 5'  B upper extremity lift from floor and place on shoulder level height shelf, 35# box: 2x5  L UE lift and carry, 40# kb: 4x2 min/LUE  B UE lift and carry box 20 ft and place on a shelf at chest level and  off the shelf and carry box 20 ft back to place on floor, #50, 2x5    Home Exercises and Patient Education Provided  Education provided:   - yes    Home Exercises Provided: yes.  Exercises were reviewed and Mitesh was able to demonstrate them prior to the end of the session.  Mitesh demonstrated good  understanding of the education provided.     Assessment   Patient reports that for the first time he did not have any soreness in right elbow or wrist post last treatment session. Functional tolerances continue to improve with higher weights used for lifting, carrying, pushing and pulling tasks. Patient tolerated all activities with no discomfort to left bicep/elbow. Patient appears to be on pace for discharge at the end of authorized visits. Will attempt to progress with remaining visits.       Pt presents with decreased ROM, muscle strength, flexibility, joint mobility. Increased pain, stiffness, soft tissue restriction. " Impaired posture, joint mechanics, balance, gait pattern.     The patient's current job specific task deficits include the following: limitation in reaching, lifting, pushing, pulling, carrying with L UE.     Patient prognosis: Good.   Rehab potential: Good.    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    Plan of care discussed with patient: Yes  Patient's spiritual, cultural and educational needs considered and patient is agreeable to the plan of care and goals as stated below:     Anticipated Barriers for therapy: chronicity of current injury    Goals:   Short Terms Goals: 2 weeks    Goal  Progress  Date    (1)  Patient will be I with HOME EXERCISE PROGRAM  Progressing, Not Met 1/12/2024   (2)  Patient will lift, #50, Floor to Waist + carry 15ft x 10 trials   Progressing, Nearly Met   1/12/2024   (3)   Patient will push/pull #200 x 25 ft using B UE s equally   Progressing, Not Met   1/12/2024     Long Term Goals: 4 weeks    Goal  Progress  Date    (1)  Patient will complete L UE single arm carry, #50 x 25 ft  Met  1/12/2024   (2)   Patient will obtain 103.5 L  strength measure with HHD to facilitate being able to open and cut boxes at work  Progressing, Not Met  1/12/2024   (3)   Patient will obtain 4+/5 L forearm supination to facilitate improved ability to carry objects with B UEs Progressing, Not Met   1/12/2024     Previous Short Term Goals Status: progressing   New Short Term Goals Status: current remain appropriate   Long Term Goal Status:   progressing   Reasons for Recertification of Therapy: update PT POC    Plan     Plan of care Certification: 1/16/2024 to 3/12/2024    Outpatient Physical Therapy 3 times weekly for 2 weeks to include the following interventions:  Cervical/Lumbar Traction, Electrical Stimulation re-eval, dry needling, Gait Training, Iontophoresis (with ), Manual Therapy, Moist Heat/ Ice, Neuromuscular Re-ed, Patient Education, Self Care, Therapeutic Activities, and Therapeutic Exercise.     Upon discharge from further skilled PT intervention it will determined if the need for a work conditioning program or Functional Capacity Evaluation is required to allow the injured worker to return to work with full potential achieved, continued improvement with body mechanics with advanced functional activities, and further minimize future work-related injuries.     Physical therapist and physical therapy assistant(s) will met face to face to discuss patient's treatment plan and progress towards established goals. Pt will be seen by a physical therapist minimally every 6th visit or every 30 days.      Anatoly Harding, PTA     01/16/2024

## 2024-01-19 ENCOUNTER — CLINICAL SUPPORT (OUTPATIENT)
Dept: REHABILITATION | Facility: HOSPITAL | Age: 44
End: 2024-01-19
Payer: COMMERCIAL

## 2024-01-19 DIAGNOSIS — M25.622 DECREASED RANGE OF MOTION OF LEFT ELBOW: ICD-10-CM

## 2024-01-19 DIAGNOSIS — M25.522 LEFT ELBOW PAIN: Primary | ICD-10-CM

## 2024-01-19 PROCEDURE — 97545 WORK HARDENING: CPT | Mod: CQ

## 2024-01-19 NOTE — PROGRESS NOTES
"[facility]   Physical Therapy Workers' Compensation Progress Note      Name: Mitesh Aquino  Clinic Number: [id number]    Therapy Diagnosis:   Encounter Diagnoses   Name Primary?    Left elbow pain Yes    Decreased range of motion of left elbow      Physician: Taj Mccall     Physician Orders: PT Eval and Treat    Medical Diagnosis from Referral:   S46.212A (ICD-10-CM) - Rupture of left distal biceps tendon, initial encounter   M25.622 (ICD-10-CM) - Decreased range of motion of left elbow     Evaluation Date: 1/16/2024  Authorization Period Expiration: 11/8/2024  Plan of Care Expiration: 3/12/2024  Visit # / Visits authorized: 8/ 12  PTA visit #: 1/5    Foto  Date  Score    #1/3 12/22/2023 7.5   #2/3 1/2/2024 6.7   #3/3 1/16/2024 .8     Time In: 10:10 am  Time Out: 12:10 pm  Total Appointment Time: 120 minutes    Precautions: Standard    Subjective   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, stocking, moving containers, sales     Current work restrictions: no restrictions at this time   Previous work status: 5 days/week, 8 hour days   Current work status: 5 days/week, 8 hour days, doesn't use L arm to lift   Date last worked (if applicable): "I never stopped working"     Pain:  Current 0/10  Location: L Biceps     Pt's goals: Return to employment according to previous level of function, get strength back in arm to facilitate lifting with B arms     HOME EXERCISE PROGRAM: reviewed, reports compliance   Response: feels more soreness at area of L elbow after leaning on the L arm following heavier lifting (of fireworks) and doing more during work conditioning sessions   Function: limitation in heavy lifting, pushing, pulling, carrying and reaching tasks for work related duties     Objective    1/12/2024:  R hand dominant   Physiological Movements and Muscle Performance:  R shoulder flexion: 180 degrees   L shoulder flexion: 178 degrees   Elbow Right "  Left    (Normal, in degrees) AROM MMT AROM MMT   Flexion (145) 150 5/5 150 4+/5   Extension (10-0) -5 4+/5 +1 4/5   Wrist       Flexion (85) 87 5/5 91 4/5   Extension (85) 74 5/5 69 4+/5   Ulna deviation (45) 43 5/5 35 4/5   Radial deviation (20) 33 5/5 24 4/5   Supination (90) 79 4+/5 61 4/5   Pronation (85) 150 5/5 120 4+/5     Hand AROM/MMT Screening    Pt able to make full fist? Yes   Pt able to complete full opening of hand? Yes    Limitations present with thumb flexion/extension, abduction/adduction, opposition? Yes   Pinch Strength: 3 jaw ryley  (R) 18.5 lbs; (L) 14.5 lbs   Pinch Strength: Tip to tip (R) 24; (L)19.0 lbs   Pinch Strength: Lateral pinch (R) 23 ; (L) 18.5 lbs    Strength (Position 2): 115 lbs  74 lbs     Cardiovascular/pulmonary functions  Six Minute Walk Test (distance in feet): 6079  Each lap = 100 ft   Lap 1 100   Lap 2 100  Lap 3 100  Lap 4 100  Lap 5 100  Lap 6 100  Lap 7: 79     Work Related Functional Tasks   Current Ability:   in lbs. Job Demands  In lbs   Lifting Floor to Waist + carry 15ft  #40, x 10 reps  #30-50   Lifting Floor - Chest  #50 x 10 reps  #30-50   Lifting Shoulder to Overhead tba #20-30   Pushing #100 x 3' (60 ft/lap)   #   Pulling #100 x 3' (60 ft/lap) #   Carrying (Single Arm) L #40 x 2' twice (60 ft/lap)  #30-50 cases     Repetitive or Active Work-Related Activities    Current Ability Job Demand   Walking 10' tba   Ladder Climbing tba Rarely for maintenance tasks or putting flowers on shelves    Reaching in Front tba #30-50   Gripping Blue (level 4 resistance) digiflex & Black (level 5 ) hand x-  Using knives to cut boxes, work register, use nuts, wrenches and bolds   Overhead Reaching + fine motor manipulation     X10' with #2 cuff weights at wrists  Frequently, #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    Score:  "6.7           Treatment     Work conditioning   Stretches   L wrist extension/flexion stretch, arm at 90 deg elevation: 30"x5   L forearm supination stretch, #6: 30"x5   L ulnar deviation stretch, #5: 30"x5   Pec stretch, hands below waist at doorway: 30"x5    Cardio:  Nustep, 3.0 resistance: x 10'  UBE, forward 5'/ retro 5', lvl 3.0 resistance   TM walk,2%, 2.3 mph: x 10'    Strengthening:  L wrist flex/ext, #7: 3x12  L wrist supination/pronation, #7: 3x12   L wrist radial/ulnar deviation, #7: 3x12  L black digiflex: 3x15   L blue hand X-: 3x15     Functional training:   Moving #10, #9, #8, #7: waist height shelf <-> shoulder height shelf <-> platforms on either side of shelf, L UE: x 10'  Sled push, #100: 3 min  Sled pull, #100: 3 min  Screw board, #3 cuff weights at B wrists: 2 x 5'  B upper extremity lift from floor and place on shoulder level height shelf, 35# box: 2x5  L UE lift and carry, 40# kb: 4x2 min/LUE  B UE lift and carry box 20 ft and place on a shelf at chest level and  off the shelf and carry box 20 ft back to place on floor, #50, 2x5    Home Exercises and Patient Education Provided  Education provided:   - yes    Home Exercises Provided: yes.  Exercises were reviewed and Mitesh was able to demonstrate them prior to the end of the session.  Mitesh demonstrated good  understanding of the education provided.     Assessment   Patient reports that he continues not to have any soreness in right elbow or wrist. Functional tolerances continue to improve with higher weights used for lifting, carrying, pushing and pulling tasks. Patient tolerated all activities with no discomfort to left bicep/elbow. Patient appears to be on pace for discharge at the end of authorized visits. Will attempt to progress with remaining visits.       Pt presents with decreased ROM, muscle strength, flexibility, joint mobility. Increased pain, stiffness, soft tissue restriction. Impaired posture, joint mechanics, " balance, gait pattern.     The patient's current job specific task deficits include the following: limitation in reaching, lifting, pushing, pulling, carrying with L UE.     Patient prognosis: Good.   Rehab potential: Good.    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    Plan of care discussed with patient: Yes  Patient's spiritual, cultural and educational needs considered and patient is agreeable to the plan of care and goals as stated below:     Anticipated Barriers for therapy: chronicity of current injury    Goals:   Short Terms Goals: 2 weeks    Goal  Progress  Date    (1)  Patient will be I with HOME EXERCISE PROGRAM  Progressing, Not Met 1/12/2024   (2)  Patient will lift, #50, Floor to Waist + carry 15ft x 10 trials   Progressing, Nearly Met   1/12/2024   (3)   Patient will push/pull #200 x 25 ft using B UE s equally   Progressing, Not Met   1/12/2024     Long Term Goals: 4 weeks    Goal  Progress  Date    (1)  Patient will complete L UE single arm carry, #50 x 25 ft  Met  1/12/2024   (2)   Patient will obtain 103.5 L  strength measure with HHD to facilitate being able to open and cut boxes at work  Progressing, Not Met  1/12/2024   (3)   Patient will obtain 4+/5 L forearm supination to facilitate improved ability to carry objects with B UEs Progressing, Not Met   1/12/2024     Previous Short Term Goals Status: progressing   New Short Term Goals Status: current remain appropriate   Long Term Goal Status:   progressing   Reasons for Recertification of Therapy: update PT POC    Plan     Plan of care Certification: 1/16/2024 to 3/12/2024    Outpatient Physical Therapy 3 times weekly for 2 weeks to include the following interventions: Cervical/Lumbar Traction,  Electrical Stimulation re-eval, dry needling, Gait Training, Iontophoresis (with ), Manual Therapy, Moist Heat/ Ice, Neuromuscular Re-ed, Patient Education, Self Care, Therapeutic Activities, and Therapeutic Exercise.     Upon discharge from further skilled PT intervention it will determined if the need for a work conditioning program or Functional Capacity Evaluation is required to allow the injured worker to return to work with full potential achieved, continued improvement with body mechanics with advanced functional activities, and further minimize future work-related injuries.     Physical therapist and physical therapy assistant(s) will met face to face to discuss patient's treatment plan and progress towards established goals. Pt will be seen by a physical therapist minimally every 6th visit or every 30 days.      Anatoly Harding, PTA     01/16/2024

## 2024-01-23 ENCOUNTER — CLINICAL SUPPORT (OUTPATIENT)
Dept: REHABILITATION | Facility: HOSPITAL | Age: 44
End: 2024-01-23
Payer: COMMERCIAL

## 2024-01-23 DIAGNOSIS — M25.622 DECREASED RANGE OF MOTION OF LEFT ELBOW: ICD-10-CM

## 2024-01-23 DIAGNOSIS — M25.522 LEFT ELBOW PAIN: Primary | ICD-10-CM

## 2024-01-23 PROCEDURE — 97545 WORK HARDENING: CPT | Mod: CQ

## 2024-01-23 NOTE — PROGRESS NOTES
"[facility]   Physical Therapy Workers' Compensation Progress Note      Name: Mitesh Aquino  Clinic Number: [id number]    Therapy Diagnosis:   Encounter Diagnoses   Name Primary?    Left elbow pain Yes    Decreased range of motion of left elbow      Physician: Taj Mccall     Physician Orders: PT Eval and Treat    Medical Diagnosis from Referral:   S46.212A (ICD-10-CM) - Rupture of left distal biceps tendon, initial encounter   M25.622 (ICD-10-CM) - Decreased range of motion of left elbow     Evaluation Date: 1/16/2024  Authorization Period Expiration: 11/8/2024  Plan of Care Expiration: 3/12/2024  Visit # / Visits authorized: 10/ 12  PTA visit #: 3/5    Foto  Date  Score    #1/3 12/22/2023 7.5   #2/3 1/2/2024 6.7   #3/3 1/16/2024 .8     Time In: 10:05am  Time Out: 12:05 pm  Total Appointment Time: 120 minutes    Precautions: Standard    Subjective   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, stocking, moving containers, sales     Current work restrictions: no restrictions at this time   Previous work status: 5 days/week, 8 hour days   Current work status: 5 days/week, 8 hour days, doesn't use L arm to lift   Date last worked (if applicable): "I never stopped working"     Pain:  Current 0/10  Location: L Biceps     Pt's goals: Return to employment according to previous level of function, get strength back in arm to facilitate lifting with B arms     HOME EXERCISE PROGRAM: reviewed, reports compliance   Response: feels more soreness at area of L elbow after leaning on the L arm following heavier lifting (of fireworks) and doing more during work conditioning sessions   Function: limitation in heavy lifting, pushing, pulling, carrying and reaching tasks for work related duties     Objective    1/12/2024:  R hand dominant   Physiological Movements and Muscle Performance:  R shoulder flexion: 180 degrees   L shoulder flexion: 178 degrees   Elbow Right "  Left    (Normal, in degrees) AROM MMT AROM MMT   Flexion (145) 150 5/5 150 4+/5   Extension (10-0) -5 4+/5 +1 4/5   Wrist       Flexion (85) 87 5/5 91 4/5   Extension (85) 74 5/5 69 4+/5   Ulna deviation (45) 43 5/5 35 4/5   Radial deviation (20) 33 5/5 24 4/5   Supination (90) 79 4+/5 61 4/5   Pronation (85) 150 5/5 120 4+/5     Hand AROM/MMT Screening    Pt able to make full fist? Yes   Pt able to complete full opening of hand? Yes    Limitations present with thumb flexion/extension, abduction/adduction, opposition? Yes   Pinch Strength: 3 jaw ryley  (R) 18.5 lbs; (L) 14.5 lbs   Pinch Strength: Tip to tip (R) 24; (L)19.0 lbs   Pinch Strength: Lateral pinch (R) 23 ; (L) 18.5 lbs    Strength (Position 2): 115 lbs  74 lbs     Cardiovascular/pulmonary functions  Six Minute Walk Test (distance in feet): 6079  Each lap = 100 ft   Lap 1 100   Lap 2 100  Lap 3 100  Lap 4 100  Lap 5 100  Lap 6 100  Lap 7: 79     Work Related Functional Tasks   Current Ability:   in lbs. Job Demands  In lbs   Lifting Floor to Waist + carry 15ft  #40, x 10 reps  #30-50   Lifting Floor - Chest  #50 x 10 reps  #30-50   Lifting Shoulder to Overhead tba #20-30   Pushing #100 x 3' (60 ft/lap)   #   Pulling #100 x 3' (60 ft/lap) #   Carrying (Single Arm) L #40 x 2' twice (60 ft/lap)  #30-50 cases     Repetitive or Active Work-Related Activities    Current Ability Job Demand   Walking 10' tba   Ladder Climbing tba Rarely for maintenance tasks or putting flowers on shelves    Reaching in Front tba #30-50   Gripping Blue (level 4 resistance) digiflex & Black (level 5 ) hand x-  Using knives to cut boxes, work register, use nuts, wrenches and bolds   Overhead Reaching + fine motor manipulation     X10' with #2 cuff weights at wrists  Frequently, #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    Score:  "6.7           Treatment     Work conditioning   Stretches   L wrist extension/flexion stretch, arm at 90 deg elevation: 30"x5   L forearm supination stretch, #6: 30"x5   L ulnar deviation stretch, #5: 30"x5   Pec stretch, hands below waist at doorway: 30"x5    Cardio:  Nustep, 3.0 resistance: x 10'  UBE, forward 5'/ retro 5', lvl 3.0 resistance   TM walk,2%, 2.3 mph: x 10'  Bike, 6.0 resistance: 10 min    Strengthening:  L wrist flex/ext, #7: 3x12  L wrist supination/pronation, #7: 3x12   L wrist radial/ulnar deviation, #7: 3x12  L black digiflex: 3x15   L blue hand X-: 3x15     Functional training:   Moving #10, #9, #8, #7: waist height shelf <-> shoulder height shelf <-> platforms on either side of shelf, L UE: x 10'  Sled push, #100: 3 min  Sled pull, #100: 3 min  Screw board, #3 cuff weights at B wrists: 2 x 5'  B upper extremity lift from floor and place on shoulder level height shelf, 35# box: 2x5  L UE lift and carry, 40# kb: 4x2 min/LUE  B UE lift and carry box 20 ft and place on a shelf at chest level and  off the shelf and carry box 20 ft back to place on floor, #50, 2x5    Home Exercises and Patient Education Provided  Education provided:   - yes    Home Exercises Provided: yes.  Exercises were reviewed and Mitesh was able to demonstrate them prior to the end of the session.  Mitesh demonstrated good  understanding of the education provided.     Assessment     Functional tolerances continue to improve with higher weights used for lifting, carrying, pushing and pulling tasks. Furthermore, patient continues to report min to no pain when performing typical work duties. Lastly, patient tolerated all activities with no discomfort to left bicep/elbow. Patient appears to be on pace for discharge at the end of authorized visits. Will attempt to progress with remaining visits.       Pt presents with decreased ROM, muscle strength, flexibility, joint mobility. Increased pain, stiffness, soft tissue " restriction. Impaired posture, joint mechanics, balance, gait pattern.     The patient's current job specific task deficits include the following: limitation in reaching, lifting, pushing, pulling, carrying with L UE.     Patient prognosis: Good.   Rehab potential: Good.    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    Plan of care discussed with patient: Yes  Patient's spiritual, cultural and educational needs considered and patient is agreeable to the plan of care and goals as stated below:     Anticipated Barriers for therapy: chronicity of current injury    Goals:   Short Terms Goals: 2 weeks    Goal  Progress  Date    (1)  Patient will be I with HOME EXERCISE PROGRAM  Progressing, Not Met 1/12/2024   (2)  Patient will lift, #50, Floor to Waist + carry 15ft x 10 trials   Progressing, Nearly Met   1/12/2024   (3)   Patient will push/pull #200 x 25 ft using B UE s equally   Progressing, Not Met   1/12/2024     Long Term Goals: 4 weeks    Goal  Progress  Date    (1)  Patient will complete L UE single arm carry, #50 x 25 ft  Met  1/12/2024   (2)   Patient will obtain 103.5 L  strength measure with HHD to facilitate being able to open and cut boxes at work  Progressing, Not Met  1/12/2024   (3)   Patient will obtain 4+/5 L forearm supination to facilitate improved ability to carry objects with B UEs Progressing, Not Met   1/12/2024     Previous Short Term Goals Status: progressing   New Short Term Goals Status: current remain appropriate   Long Term Goal Status:   progressing   Reasons for Recertification of Therapy: update PT POC    Plan     Plan of care Certification: 1/16/2024 to 3/12/2024    Outpatient Physical Therapy 3 times weekly for 2 weeks to include the following  interventions: Cervical/Lumbar Traction, Electrical Stimulation re-eval, dry needling, Gait Training, Iontophoresis (with ), Manual Therapy, Moist Heat/ Ice, Neuromuscular Re-ed, Patient Education, Self Care, Therapeutic Activities, and Therapeutic Exercise.     Upon discharge from further skilled PT intervention it will determined if the need for a work conditioning program or Functional Capacity Evaluation is required to allow the injured worker to return to work with full potential achieved, continued improvement with body mechanics with advanced functional activities, and further minimize future work-related injuries.     Physical therapist and physical therapy assistant(s) will met face to face to discuss patient's treatment plan and progress towards established goals. Pt will be seen by a physical therapist minimally every 6th visit or every 30 days.      Anatoly Harding, PTA     01/16/2024

## 2024-01-25 ENCOUNTER — CLINICAL SUPPORT (OUTPATIENT)
Dept: REHABILITATION | Facility: HOSPITAL | Age: 44
End: 2024-01-25
Payer: COMMERCIAL

## 2024-01-25 DIAGNOSIS — M25.522 LEFT ELBOW PAIN: Primary | ICD-10-CM

## 2024-01-25 DIAGNOSIS — M25.622 DECREASED RANGE OF MOTION OF LEFT ELBOW: ICD-10-CM

## 2024-01-25 PROCEDURE — 97545 WORK HARDENING: CPT | Mod: CQ

## 2024-01-25 NOTE — PROGRESS NOTES
"[facility]   Physical Therapy Workers' Compensation Progress Note      Name: Mitesh Aquino  Clinic Number: [id number]    Therapy Diagnosis:   Encounter Diagnoses   Name Primary?    Left elbow pain Yes    Decreased range of motion of left elbow      Physician: Taj Mccall     Physician Orders: PT Eval and Treat    Medical Diagnosis from Referral:   S46.212A (ICD-10-CM) - Rupture of left distal biceps tendon, initial encounter   M25.622 (ICD-10-CM) - Decreased range of motion of left elbow     Evaluation Date: 1/25/2024  Authorization Period Expiration: 11/8/2024  Plan of Care Expiration: 3/12/2024  Visit # / Visits authorized: 11/ 12  PTA visit #: 4/5    Foto  Date  Score    #1/3 12/22/2023 7.5   #2/3 1/2/2024 6.7   #3/3 1/16/2024 .8     Time In: 10:10 am  Time Out: 12:10 pm  Total Appointment Time: 120 minutes    Precautions: Standard    Subjective   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, stocking, moving containers, sales     Current work restrictions: no restrictions at this time   Previous work status: 5 days/week, 8 hour days   Current work status: 5 days/week, 8 hour days, doesn't use L arm to lift   Date last worked (if applicable): "I never stopped working"     Pain:  Current 0/10  Location: L Biceps     Pt's goals: Return to employment according to previous level of function, get strength back in arm to facilitate lifting with B arms     HOME EXERCISE PROGRAM: reviewed, reports compliance   Response: feels more soreness at area of L elbow after leaning on the L arm following heavier lifting (of fireworks) and doing more during work conditioning sessions   Function: limitation in heavy lifting, pushing, pulling, carrying and reaching tasks for work related duties     Objective    1/12/2024:  R hand dominant   Physiological Movements and Muscle Performance:  R shoulder flexion: 180 degrees   L shoulder flexion: 178 degrees   Elbow " Right  Left    (Normal, in degrees) AROM MMT AROM MMT   Flexion (145) 150 5/5 150 4+/5   Extension (10-0) -5 4+/5 +1 4/5   Wrist       Flexion (85) 87 5/5 91 4/5   Extension (85) 74 5/5 69 4+/5   Ulna deviation (45) 43 5/5 35 4/5   Radial deviation (20) 33 5/5 24 4/5   Supination (90) 79 4+/5 61 4/5   Pronation (85) 150 5/5 120 4+/5     Hand AROM/MMT Screening    Pt able to make full fist? Yes   Pt able to complete full opening of hand? Yes    Limitations present with thumb flexion/extension, abduction/adduction, opposition? Yes   Pinch Strength: 3 jaw ryley  (R) 18.5 lbs; (L) 14.5 lbs   Pinch Strength: Tip to tip (R) 24; (L)19.0 lbs   Pinch Strength: Lateral pinch (R) 23 ; (L) 18.5 lbs    Strength (Position 2): 115 lbs  74 lbs     Cardiovascular/pulmonary functions  Six Minute Walk Test (distance in feet): 6079  Each lap = 100 ft   Lap 1 100   Lap 2 100  Lap 3 100  Lap 4 100  Lap 5 100  Lap 6 100  Lap 7: 79     Work Related Functional Tasks   Current Ability:   in lbs. Job Demands  In lbs   Lifting Floor to Waist + carry 15ft  #40, x 10 reps  #30-50   Lifting Floor - Chest  #50 x 10 reps  #30-50   Lifting Shoulder to Overhead tba #20-30   Pushing #100 x 3' (60 ft/lap)   #   Pulling #100 x 3' (60 ft/lap) #   Carrying (Single Arm) L #40 x 2' twice (60 ft/lap)  #30-50 cases     Repetitive or Active Work-Related Activities    Current Ability Job Demand   Walking 10' tba   Ladder Climbing tba Rarely for maintenance tasks or putting flowers on shelves    Reaching in Front tba #30-50   Gripping Blue (level 4 resistance) digiflex & Black (level 5 ) hand x-  Using knives to cut boxes, work register, use nuts, wrenches and bolds   Overhead Reaching + fine motor manipulation     X10' with #2 cuff weights at wrists  Frequently, #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    Score:  "6.7           Treatment     Work conditioning   Stretches   L wrist extension/flexion stretch, arm at 90 deg elevation: 30"x5   L forearm supination stretch, #6: 30"x5   L ulnar deviation stretch, #5: 30"x5   Pec stretch, hands below waist at doorway: 30"x5    Cardio:  Nustep, 3.0 resistance: x 10'  UBE, forward 5'/ retro 5', lvl 3.0 resistance   TM walk,2%, 2.3 mph: x 10'  Bike, 6.0 resistance: 10 min    Strengthening:  L wrist flex/ext, #7: 3x12  L wrist supination/pronation, #7: 3x12   L wrist radial/ulnar deviation, #7: 3x12  L black digiflex: 3x15   L blue hand X-: 3x15     Functional training:   Moving #10, #9, #8, #7: waist height shelf <-> shoulder height shelf <-> platforms on either side of shelf, L UE: x 10'  Sled push, #100: 3 min  Sled pull, #100: 3 min  Screw board, #3 cuff weights at B wrists: 2 x 5'  B upper extremity lift from floor and place on shoulder level height shelf, 35# box: 2x5  L UE lift and carry, 40# kb: 4x2 min/LUE  B UE lift and carry box 20 ft and place on a shelf at chest level and  off the shelf and carry box 20 ft back to place on floor, #50, 2x5    Home Exercises and Patient Education Provided  Education provided:   - yes    Home Exercises Provided: yes.  Exercises were reviewed and Mitesh was able to demonstrate them prior to the end of the session.  Mitesh demonstrated good  understanding of the education provided.     Assessment     Functional tolerances continue to improve with all lifting, carrying, pushing and pulling tasks. Furthermore, patient continues to report min to no pain when performing typical work duties. Lastly, patient tolerated all activities with no discomfort to left bicep/elbow. Patient appears to be on pace for discharge at the end of authorized visits.        Pt presents with decreased ROM, muscle strength, flexibility, joint mobility. Increased pain, stiffness, soft tissue restriction. Impaired posture, joint mechanics, balance, gait " pattern.     The patient's current job specific task deficits include the following: limitation in reaching, lifting, pushing, pulling, carrying with L UE.     Patient prognosis: Good.   Rehab potential: Good.    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    Plan of care discussed with patient: Yes  Patient's spiritual, cultural and educational needs considered and patient is agreeable to the plan of care and goals as stated below:     Anticipated Barriers for therapy: chronicity of current injury    Goals:   Short Terms Goals: 2 weeks    Goal  Progress  Date    (1)  Patient will be I with HOME EXERCISE PROGRAM  Progressing, Not Met 1/12/2024   (2)  Patient will lift, #50, Floor to Waist + carry 15ft x 10 trials   Progressing, Nearly Met   1/12/2024   (3)   Patient will push/pull #200 x 25 ft using B UE s equally   Progressing, Not Met   1/12/2024     Long Term Goals: 4 weeks    Goal  Progress  Date    (1)  Patient will complete L UE single arm carry, #50 x 25 ft  Met  1/12/2024   (2)   Patient will obtain 103.5 L  strength measure with HHD to facilitate being able to open and cut boxes at work  Progressing, Not Met  1/12/2024   (3)   Patient will obtain 4+/5 L forearm supination to facilitate improved ability to carry objects with B UEs Progressing, Not Met   1/12/2024     Previous Short Term Goals Status: progressing   New Short Term Goals Status: current remain appropriate   Long Term Goal Status:   progressing   Reasons for Recertification of Therapy: update PT POC    Plan     Plan of care Certification: 1/25/2024 to 3/12/2024    Outpatient Physical Therapy 3 times weekly for 2 weeks to include the following interventions: Cervical/Lumbar Traction, Electrical Stimulation  re-eval, dry needling, Gait Training, Iontophoresis (with ), Manual Therapy, Moist Heat/ Ice, Neuromuscular Re-ed, Patient Education, Self Care, Therapeutic Activities, and Therapeutic Exercise.     Upon discharge from further skilled PT intervention it will determined if the need for a work conditioning program or Functional Capacity Evaluation is required to allow the injured worker to return to work with full potential achieved, continued improvement with body mechanics with advanced functional activities, and further minimize future work-related injuries.     Physical therapist and physical therapy assistant(s) will met face to face to discuss patient's treatment plan and progress towards established goals. Pt will be seen by a physical therapist minimally every 6th visit or every 30 days.      Anatoly Harding, PTA     01/25/2024

## 2024-01-30 ENCOUNTER — CLINICAL SUPPORT (OUTPATIENT)
Dept: REHABILITATION | Facility: HOSPITAL | Age: 44
End: 2024-01-30
Payer: COMMERCIAL

## 2024-01-30 DIAGNOSIS — M25.522 LEFT ELBOW PAIN: Primary | ICD-10-CM

## 2024-01-30 DIAGNOSIS — M25.622 DECREASED RANGE OF MOTION OF LEFT ELBOW: ICD-10-CM

## 2024-01-30 PROCEDURE — 97545 WORK HARDENING: CPT

## 2024-01-30 NOTE — PROGRESS NOTES
"[facility]   Physical Therapy Workers' Compensation Discharge Note     Name: Mitesh Aquino  Clinic Number: [id number]    Therapy Diagnosis:   Encounter Diagnoses   Name Primary?    Left elbow pain Yes    Decreased range of motion of left elbow      Physician: Taj Mccall     Physician Orders: PT Eval and Treat    Medical Diagnosis from Referral:   S46.212A (ICD-10-CM) - Rupture of left distal biceps tendon, initial encounter   M25.622 (ICD-10-CM) - Decreased range of motion of left elbow     Evaluation Date: 1/30/2024  Authorization Period Expiration: 11/8/2024  Plan of Care Expiration: 3/12/2024  Visit # / Visits authorized: 12/ 12  PTA visit #: 0/5    Foto  Date  Score    #1/3 12/22/2023 7.5   #2/3 1/2/2024 6.7   #3/3 1/16/2024 .8     Time In: 12:10pm  Time Out: 2:10pm  Total Appointment Time: 120 minutes    Precautions: Standard    Subjective   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 Currently working full time, full duty - no working restrictions. Does not feel limited at work and not preventing him from completing any work tasks. Reports feels ready for DC from work conditioning at this time.     HOME EXERCISE PROGRAM: reviewed, reports compliance   Response: feels more soreness at area of L elbow after leaning on the L arm following heavier lifting (of fireworks) and doing more during work conditioning sessions   Function: limitation in heavy lifting, pushing, pulling, carrying and reaching tasks for work related duties     Objective  "   1/30/2024: R hand dominant     Physiological Movements and Muscle Performance:  R shoulder flexion: 180 degrees   L shoulder flexion: 178 degrees   Elbow Right  Left    (Normal, in degrees) AROM MMT AROM MMT   Flexion (145) 150 5/5 150 4+/5   Extension (10-0) -5 4+/5 +1 4/5   Wrist       Flexion (85) 87 5/5 91 4/5   Extension (85) 74 5/5 69 4+/5   Ulna deviation (45) 43 5/5 35 4/5   Radial deviation (20) 33 5/5 24 4/5   Supination (90) 79 4+/5 61 4/5   Pronation (85) 150 5/5 120 4+/5     Hand AROM/MMT Screening    Pt able to make full fist? Yes   Pt able to complete full opening of hand? Yes    Limitations present with thumb flexion/extension, abduction/adduction, opposition? Yes   Pinch Strength: 3 jaw ryley  (R) 18.5 lbs; (L) 14.5 lbs    Pinch Strength: Tip to tip (R) 24; (L)19.0 lbs   Pinch Strength: Lateral pinch (R) 23 ; (L) 19.0 lbs     Strength (Position 2): 115 lbs  77lbs     Cardiovascular/pulmonary functions  Six Minute Walk Test (distance in feet): 6079 when last tested, no follow up tested needed    Work Related Functional Tasks   Current Ability:   in lbs. Job Demands  In lbs   Lifting Floor to Waist + carry 15ft  #40, x 10 reps  #30-50   Lifting Floor - Chest  #50 x 10 reps  #30-50   Lifting Shoulder to Overhead tba #20-30   Pushing #100 x 3' (60 ft/lap)   #   Pulling #100 x 3' (60 ft/lap) #   Carrying (Single Arm) L #40 x 2' twice (60 ft/lap)  #30-50 cases     Repetitive or Active Work-Related Activities    Current Ability Job Demand   Walking 10' tba   Ladder Climbing Subjectively reports rarely, but no issues at this time Rarely for maintenance tasks or putting flowers on shelves    Reaching in Front tba #30-50   Gripping Blue (level 4 resistance) digiflex & Black (level 5 ) hand x-  Using knives to cut boxes, work register, use nuts, wrenches and bolds   Overhead Reaching + fine motor manipulation     X10' with #2 cuff weights at wrists  Frequently, #10-50 for stocking  "shelves      Intake Outcome Measure for FOTO Elbow Survey    Therapist reviewed FOTO scores   FOTO documents entered into EPIC - see Media section.    Score: 6.7           Treatment     Work conditioning   Stretches   L wrist extension/flexion stretch, arm at 90 deg elevation: 30"x5   L forearm supination stretch, #6: 30"x5   L ulnar deviation stretch, #5: 30"x5   Pec stretch, hands below waist at doorway: 30"x5    Cardio:  Nustep, 3.0 resistance: x 10'  UBE, forward 5'/ retro 5', lvl 3.0 resistance   TM walk,2%, 2.3 mph: x 10'  Bike, 6.0 resistance: 10 min    Strengthening:  L wrist flex/ext, #7: 3x12  L wrist supination/pronation, #7: 3x12   L wrist radial/ulnar deviation, #7: 3x12  L black digiflex: 3x15   L blue hand X-: 3x15     Functional training:   Moving #10, #9, #8, #7: waist height shelf <-> shoulder height shelf <-> platforms on either side of shelf, L UE: x 10'  Sled push, #100: 3 min  Sled pull, #100: 3 min  Screw board, #3 cuff weights at B wrists: 2 x 5'  B upper extremity lift from floor and place on shoulder level height shelf, 35# box: 2x5  L UE lift and carry, 40# kb: 4x2 min/LUE  B UE lift and carry box 20 ft and place on a shelf at chest level and  off the shelf and carry box 20 ft back to place on floor, #50, 2x5    Home Exercises and Patient Education Provided  Education provided:   - yes    Home Exercises Provided: yes.  Exercises were reviewed and Mitesh was able to demonstrate them prior to the end of the session.  Mitesh demonstrated good  understanding of the education provided.     Assessment     At this time, the pt has completed 12 of 12 authorized work conditioning PT visits. Overall, he reports 70% back to normal at this time, mostly limited still due to strength deficits. He is currently working full time and full duty with no working restrictions. He does not feel limited at work and not preventing him from completing any work tasks. Reports feels ready for DC from " work conditioning at this time. Ultimately feel as though the patient is ready for DC at this time and suggest he continues with his HEP for maintenance purposes 3-4 times per week.     Patient prognosis: Good.   Rehab potential: Good.    Plan of care discussed with patient: Yes  Patient's spiritual, cultural and educational needs considered and patient is agreeable to the plan of care and goals as stated below:     Anticipated Barriers for therapy: chronicity of current injury    Goals:   Short Terms Goals: 2 weeks    Goal  Progress  Date    (1)  Patient will be I with HOME EXERCISE PROGRAM  MET 1/12/2024   (2)  Patient will lift, #50, Floor to Waist + carry 15ft x 10 trials   MET  1/12/2024   (3)   Patient will push/pull #200 x 25 ft using B UE s equally   Able to complete 100lbs x4 mins  1/12/2024     Long Term Goals: 4 weeks    Goal  Progress  Date    (1)  Patient will complete L UE single arm carry, #50 x 25 ft  MET 1/12/2024   (2)   Patient will obtain 103.5 L  strength measure with HHD to facilitate being able to open and cut boxes at work  Progressing, Not Met  1/12/2024   (3)   Patient will obtain 4+/5 L forearm supination to facilitate improved ability to carry objects with B UEs MET  1/12/2024     Previous Short Term Goals Status: progressing   New Short Term Goals Status: current remain appropriate   Long Term Goal Status:   progressing   Reasons for Recertification of Therapy: update PT POC    Plan     Pt is discharged at this time from further work conditioning. He is to continue HEP 3-4 times per week for maintenance purposes.       Bright Galan, PT     01/30/2024

## 2024-02-28 NOTE — PROGRESS NOTES
CC: Left Distal Biceps Post-Op - Workman's Compensation Case     DATE OF PROCEDURE: 8/9/2023   PROCEDURES PERFORMED:   Left distal biceps open achilles allograft reconstruction (CPT 13212-12)    Mitesh Aquino presents today for follow up appointment of his left elbow. Patient is now 6 months 25 days status post above procedure.  Doing very well.  No complaints.  No numbness or tingling.  Back to desired day-to-day activity.    Prior Hx 1/4/2024:   Mitesh Aquino, presents today for follow up appointment of his left elbow. Patient is now just under 5 months status post above procedure. He continues to work with PT at [place].  States the elbow feels good.  Really has no complaints.  He does have some subjective weakness periodically but able to do almost all desired day-to-day activity.  He has been participating in some of his normal work duties at the [employer] but uses predominantly his right upper extremity for lifting.  Does not feel that he is fully recovered from that standpoint.    This is a workman's compensation case.    Prior Hx 10/31/2023:  Mitesh Aquino reports to be doing well just under 12 wk s/p the above mentioned procedure. Doing very well post operatively. He is in PT at [place] and progressing well. No pain in the elbow or biceps region.  No numbness or tingling.  Would like to go fishing if possible.    PAST MEDICAL HISTORY:   History reviewed. No pertinent past medical history.    PAST SURGICAL HISTORY:  Past Surgical History:   Procedure Laterality Date    knee scope Bilateral     at 16 years old    REPAIR, TENDON, ARTHROSCOPIC, WITH CONVERSION TO OPEN TENDON REPAIR IF INDICATED Left 8/9/2023    Procedure: REPAIR, TENDON, ARTHROSCOPIC, WITH CONVERSION TO OPEN TENDON REPAIR IF INDICATED, RECONSTRUCTION;  Surgeon: CHRISSY Mccall MD;  [facility];  Service: Orthopedics;  Laterality: Left;  0.2% Ropivacaine    TONSILLECTOMY       FAMILY HISTORY:  History reviewed. No pertinent  "family history.    MEDICATIONS:    Current Outpatient Medications:     aspirin (ECOTRIN) 81 MG EC tablet, Take 1 tablet (81 mg total) by mouth 2 (two) times a day. for 14 days, Disp: 28 tablet, Rfl: 0    celecoxib (CELEBREX) 200 MG capsule, Take 1 capsule (200 mg total) by mouth once daily. (Patient not taking: Reported on 3/5/2024), Disp: 30 capsule, Rfl: 1    indomethacin (INDOCIN SR) 75 mg CpSR CR capsule, Take 1 capsule (75 mg total) by mouth daily with breakfast. (Patient not taking: Reported on 9/21/2023), Disp: 21 capsule, Rfl: 0    ondansetron (ZOFRAN-ODT) 4 MG TbDL, Dissolve 1 tablet (4 mg total) by mouth every 8 (eight) hours as needed (nausea). (Patient not taking: Reported on 9/21/2023), Disp: 30 tablet, Rfl: 0    oxyCODONE (ROXICODONE) 5 MG immediate release tablet, Take 1-2 tablets (5-10 mg total) by mouth every 4 to 6 hours as needed for Pain. (Patient not taking: Reported on 9/21/2023), Disp: 28 tablet, Rfl: 0  No current facility-administered medications for this visit.    Facility-Administered Medications Ordered in Other Visits:     fentaNYL 50 mcg/mL injection 25 mcg, 25 mcg, Intravenous, Q5 Min PRN, Cintia Nguyen MD    haloperidol lactate injection 0.5 mg, 0.5 mg, Intravenous, Q10 Min PRN, Cintia Nguyen MD    sodium chloride 0.9% flush 10 mL, 10 mL, Intravenous, PRN, Cintia Nguyen MD    ALLERGIES:  Review of patient's allergies indicates:   Allergen Reactions    Poison ivy extract      REVIEW OF SYSTEMS:  Constitution: Negative. Negative for chills, fever and night sweats.    Hematologic/Lymphatic: Negative for bleeding problem. Does not bruise/bleed easily.   Skin: Negative for dry skin, itching and rash.   Musculoskeletal: Negative for falls.  Negative for left elbow pain and muscle weakness.     All other review of symptoms were reviewed and found to be noncontributory.     PHYSICAL EXAMINATION:  Vitals:  /87   Pulse 71   Ht 5' 9" (1.753 m)   Wt 94.8 kg (209 lb 1.7 oz)   BMI " 30.88 kg/m²    General: Well-developed well-nourished 43 y.o. malein no acute distress   Cardiovascular: Regular rhythm by palpation of distal pulse, normal color and temperature, no concerning varicosities on symptomatic side   Lungs: No labored breathing or wheezing appreciated   Neuro: Alert and oriented ×3   Psychiatric: well oriented to person, place and time, demonstrates normal mood and affect   Skin: No rashes, lesions or ulcers, normal temperature, turgor, and texture on uninvolved extremity    Ortho/SPM Exam  Exam of the left elbow demonstrates a well healed surgical scar.  No scar hypertrophy.  No hypersensitivity to touch.  No signs of infection. Non tender to palpation throughout biceps. Able to palpate biceps tendon in antecubital fossa, tensioned well.  No pain to palpation.  No tenderness.  Biceps atrophy noted but improving. Full elbow extension, able to flex hand to shoulder. Full forearm pronation/supination of forearm.  No pain or weakness with resisted forearm supination.  NVI distally.     IMAGING:  None.     ASSESSMENT:      ICD-10-CM ICD-9-CM   1. Rupture of left distal biceps tendon, subsequent encounter  S46.212D V58.89     841.8     Status post reconstruction - doing well    PLAN:     Clinically the patient looks quite good.  Cleared for all activity.  Has met maximum medical improvement.  Return to clinic as needed.      Procedures

## 2024-03-05 ENCOUNTER — OFFICE VISIT (OUTPATIENT)
Dept: SPORTS MEDICINE | Facility: CLINIC | Age: 44
End: 2024-03-05
Payer: COMMERCIAL

## 2024-03-05 VITALS
HEIGHT: 69 IN | WEIGHT: 209.13 LBS | DIASTOLIC BLOOD PRESSURE: 87 MMHG | SYSTOLIC BLOOD PRESSURE: 128 MMHG | HEART RATE: 71 BPM | BODY MASS INDEX: 30.97 KG/M2

## 2024-03-05 DIAGNOSIS — S46.212D RUPTURE OF LEFT DISTAL BICEPS TENDON, SUBSEQUENT ENCOUNTER: Primary | ICD-10-CM

## 2024-03-05 PROCEDURE — 99213 OFFICE O/P EST LOW 20 MIN: CPT | Mod: S$GLB,,, | Performed by: ORTHOPAEDIC SURGERY

## 2024-03-05 PROCEDURE — 99999 PR PBB SHADOW E&M-EST. PATIENT-LVL III: CPT | Mod: PBBFAC,,, | Performed by: ORTHOPAEDIC SURGERY

## (undated) DEVICE — PAD CAST SPECIALIST STRL 4

## (undated) DEVICE — Device

## (undated) DEVICE — BANDAGE MATRIX HK LOOP 4IN 5YD

## (undated) DEVICE — DRAPE U SPLIT SHEET 54X76IN

## (undated) DEVICE — GLOVE BIOGEL SKINSENSE PI 7.0

## (undated) DEVICE — TOURNIQUET SB QC DP 18X4IN

## (undated) DEVICE — SYS CLSR DERMABOND PRINEO 22CM

## (undated) DEVICE — GLOVE SENSICARE PI SURG 8

## (undated) DEVICE — GLOVE SENSICARE PI GRN 8.5

## (undated) DEVICE — NDL SURGEON MAYO #4

## (undated) DEVICE — CORD FOR BIPOLAR FORCEPS 12

## (undated) DEVICE — GLOVE BIOGEL PI MICRO INDIC 7

## (undated) DEVICE — DRAPE TOP 53X102IN

## (undated) DEVICE — DRESSING AQUACEL AG SILVER 4X4

## (undated) DEVICE — HOSE DUAL W/CPC CONNECTORS

## (undated) DEVICE — SUT 0 18IN SILK BLK BRAIDE

## (undated) DEVICE — ADHESIVE MASTISOL VIAL 48/BX

## (undated) DEVICE — PAD ABDOMINAL STERILE 8X10IN

## (undated) DEVICE — TRAY MINOR ORTHO OMC

## (undated) DEVICE — YANKAUER OPEN TIP W/O VENT

## (undated) DEVICE — SLING ARM LARGE FOAM STRAP

## (undated) DEVICE — SUT VICRYL 2-0 CT-2 VCP269H

## (undated) DEVICE — NDL MAYO CAT 1/2 CIR #6

## (undated) DEVICE — BNDG COFLEX FOAM LF2 ST 4X5YD

## (undated) DEVICE — DRESSING XEROFORM NONADH 1X8IN

## (undated) DEVICE — SUT BLU BR 2 TAPERD NDL 1/2

## (undated) DEVICE — SEE MEDLINE ITEM 159592

## (undated) DEVICE — STRIP MEDI WND CLSR 1/2X4IN

## (undated) DEVICE — FORCEP STRAIGHT DISP

## (undated) DEVICE — NDL SURGEON MAYO #7 2/PK 72PKS

## (undated) DEVICE — BANDAGE ESMARK ELASTIC ST 4X9

## (undated) DEVICE — DRAPE C-ARM ELAS CLIP 42X120IN

## (undated) DEVICE — SOCKINETTE DOUBLE PLY 4X48IN

## (undated) DEVICE — DRAPE HAND STERILE

## (undated) DEVICE — SUT CTD VICRYL 0 UND BR

## (undated) DEVICE — SUT MCRYL PLUS 4-0 PS2 27IN

## (undated) DEVICE — GLOVE SENSICARE PI GRN 7

## (undated) DEVICE — SUT VICRYL 3-0 27 SH

## (undated) DEVICE — BANDAGE MATRIX HK LOOP 6IN 5YD